# Patient Record
Sex: MALE | Race: WHITE | NOT HISPANIC OR LATINO | Employment: OTHER | URBAN - NONMETROPOLITAN AREA
[De-identification: names, ages, dates, MRNs, and addresses within clinical notes are randomized per-mention and may not be internally consistent; named-entity substitution may affect disease eponyms.]

---

## 2018-07-17 ENCOUNTER — TRANSCRIBE ORDERS (OUTPATIENT)
Dept: PODIATRY | Facility: CLINIC | Age: 83
End: 2018-07-17

## 2018-07-17 DIAGNOSIS — L60.0 INGROWN TOENAIL: Primary | ICD-10-CM

## 2019-01-28 ENCOUNTER — PREP FOR SURGERY (OUTPATIENT)
Dept: OTHER | Facility: HOSPITAL | Age: 84
End: 2019-01-28

## 2019-01-28 DIAGNOSIS — N47.1 PHIMOSIS: Primary | ICD-10-CM

## 2019-01-28 RX ORDER — LEVOFLOXACIN 5 MG/ML
500 INJECTION, SOLUTION INTRAVENOUS ONCE
Status: CANCELLED | OUTPATIENT
Start: 2019-02-13

## 2019-01-29 PROBLEM — N47.1 PHIMOSIS: Status: ACTIVE | Noted: 2019-01-29

## 2019-02-26 ENCOUNTER — ANESTHESIA EVENT (OUTPATIENT)
Dept: PERIOP | Facility: HOSPITAL | Age: 84
End: 2019-02-26

## 2019-02-27 ENCOUNTER — ANESTHESIA (OUTPATIENT)
Dept: PERIOP | Facility: HOSPITAL | Age: 84
End: 2019-02-27

## 2019-02-27 ENCOUNTER — HOSPITAL ENCOUNTER (OUTPATIENT)
Facility: HOSPITAL | Age: 84
Setting detail: HOSPITAL OUTPATIENT SURGERY
Discharge: HOME | End: 2019-02-27
Attending: UROLOGY | Admitting: UROLOGY

## 2019-02-27 VITALS
HEIGHT: 68 IN | WEIGHT: 147.71 LBS | OXYGEN SATURATION: 93 % | RESPIRATION RATE: 18 BRPM | BODY MASS INDEX: 22.39 KG/M2 | DIASTOLIC BLOOD PRESSURE: 68 MMHG | SYSTOLIC BLOOD PRESSURE: 116 MMHG | HEART RATE: 102 BPM | TEMPERATURE: 97.4 F

## 2019-02-27 DIAGNOSIS — N47.1 PHIMOSIS: ICD-10-CM

## 2019-02-27 LAB
ANION GAP SERPL CALCULATED.3IONS-SCNC: 7 MMOL/L (ref 5–15)
BUN BLD-MCNC: 9 MG/DL (ref 7–21)
BUN/CREAT SERPL: 12.3 (ref 7–25)
CALCIUM SPEC-SCNC: 8.4 MG/DL (ref 8.4–10.2)
CHLORIDE SERPL-SCNC: 100 MMOL/L (ref 95–110)
CO2 SERPL-SCNC: 28 MMOL/L (ref 22–31)
CREAT BLD-MCNC: 0.73 MG/DL (ref 0.7–1.3)
GFR SERPL CREATININE-BSD FRML MDRD: 102 ML/MIN/1.73 (ref 42–98)
GLUCOSE BLD-MCNC: 118 MG/DL (ref 60–100)
GLUCOSE BLDC GLUCOMTR-MCNC: 114 MG/DL (ref 70–130)
GLUCOSE BLDC GLUCOMTR-MCNC: 124 MG/DL (ref 70–130)
POTASSIUM BLD-SCNC: 3.3 MMOL/L (ref 3.5–5.1)
SODIUM BLD-SCNC: 135 MMOL/L (ref 137–145)

## 2019-02-27 PROCEDURE — 82962 GLUCOSE BLOOD TEST: CPT

## 2019-02-27 PROCEDURE — 88304 TISSUE EXAM BY PATHOLOGIST: CPT | Performed by: PATHOLOGY

## 2019-02-27 PROCEDURE — 93010 ELECTROCARDIOGRAM REPORT: CPT | Performed by: INTERNAL MEDICINE

## 2019-02-27 PROCEDURE — 80048 BASIC METABOLIC PNL TOTAL CA: CPT | Performed by: ANESTHESIOLOGY

## 2019-02-27 PROCEDURE — 93005 ELECTROCARDIOGRAM TRACING: CPT | Performed by: ANESTHESIOLOGY

## 2019-02-27 PROCEDURE — 88304 TISSUE EXAM BY PATHOLOGIST: CPT | Performed by: UROLOGY

## 2019-02-27 PROCEDURE — 25010000002 PROPOFOL 10 MG/ML EMULSION: Performed by: NURSE ANESTHETIST, CERTIFIED REGISTERED

## 2019-02-27 PROCEDURE — 25010000002 LEVOFLOXACIN PER 250 MG: Performed by: UROLOGY

## 2019-02-27 PROCEDURE — 25010000002 FENTANYL CITRATE (PF) 100 MCG/2ML SOLUTION: Performed by: NURSE ANESTHETIST, CERTIFIED REGISTERED

## 2019-02-27 RX ORDER — WARFARIN SODIUM 1 MG/1
1 TABLET ORAL
COMMUNITY
End: 2020-06-23 | Stop reason: HOSPADM

## 2019-02-27 RX ORDER — SODIUM CHLORIDE 9 MG/ML
1000 INJECTION, SOLUTION INTRAVENOUS CONTINUOUS
Status: DISCONTINUED | OUTPATIENT
Start: 2019-02-27 | End: 2019-02-27 | Stop reason: HOSPADM

## 2019-02-27 RX ORDER — EPHEDRINE SULFATE 50 MG/ML
INJECTION, SOLUTION INTRAVENOUS AS NEEDED
Status: DISCONTINUED | OUTPATIENT
Start: 2019-02-27 | End: 2019-02-27 | Stop reason: SURG

## 2019-02-27 RX ORDER — OXYCODONE AND ACETAMINOPHEN 7.5; 325 MG/1; MG/1
1-2 TABLET ORAL EVERY 4 HOURS PRN
Qty: 10 TABLET | Refills: 0 | Status: SHIPPED | OUTPATIENT
Start: 2019-02-27 | End: 2020-06-20

## 2019-02-27 RX ORDER — FENTANYL CITRATE 50 UG/ML
INJECTION, SOLUTION INTRAMUSCULAR; INTRAVENOUS AS NEEDED
Status: DISCONTINUED | OUTPATIENT
Start: 2019-02-27 | End: 2019-02-27 | Stop reason: SURG

## 2019-02-27 RX ORDER — LIDOCAINE HYDROCHLORIDE 20 MG/ML
INJECTION, SOLUTION INFILTRATION; PERINEURAL AS NEEDED
Status: DISCONTINUED | OUTPATIENT
Start: 2019-02-27 | End: 2019-02-27 | Stop reason: SURG

## 2019-02-27 RX ORDER — PROPOFOL 10 MG/ML
VIAL (ML) INTRAVENOUS AS NEEDED
Status: DISCONTINUED | OUTPATIENT
Start: 2019-02-27 | End: 2019-02-27 | Stop reason: SURG

## 2019-02-27 RX ORDER — LOVASTATIN 40 MG/1
40 TABLET ORAL NIGHTLY
COMMUNITY

## 2019-02-27 RX ORDER — LEVOFLOXACIN 5 MG/ML
500 INJECTION, SOLUTION INTRAVENOUS ONCE
Status: COMPLETED | OUTPATIENT
Start: 2019-02-27 | End: 2019-02-27

## 2019-02-27 RX ADMIN — LIDOCAINE HYDROCHLORIDE 50 MG: 20 INJECTION, SOLUTION INFILTRATION; PERINEURAL at 18:03

## 2019-02-27 RX ADMIN — SODIUM CHLORIDE 1000 ML: 9 INJECTION, SOLUTION INTRAVENOUS at 14:24

## 2019-02-27 RX ADMIN — EPHEDRINE SULFATE 12.5 MG: 50 INJECTION INTRAVENOUS at 18:25

## 2019-02-27 RX ADMIN — FENTANYL CITRATE 25 MCG: 50 INJECTION, SOLUTION INTRAMUSCULAR; INTRAVENOUS at 18:32

## 2019-02-27 RX ADMIN — FENTANYL CITRATE 25 MCG: 50 INJECTION, SOLUTION INTRAMUSCULAR; INTRAVENOUS at 18:03

## 2019-02-27 RX ADMIN — EPHEDRINE SULFATE 12.5 MG: 50 INJECTION INTRAVENOUS at 18:15

## 2019-02-27 RX ADMIN — PROPOFOL 150 MG: 10 INJECTION, EMULSION INTRAVENOUS at 18:03

## 2019-02-27 RX ADMIN — FENTANYL CITRATE 25 MCG: 50 INJECTION, SOLUTION INTRAMUSCULAR; INTRAVENOUS at 18:17

## 2019-02-27 RX ADMIN — FENTANYL CITRATE 25 MCG: 50 INJECTION, SOLUTION INTRAMUSCULAR; INTRAVENOUS at 18:08

## 2019-02-27 RX ADMIN — LEVOFLOXACIN 500 MG: 5 INJECTION, SOLUTION INTRAVENOUS at 18:09

## 2019-02-27 NOTE — ANESTHESIA PREPROCEDURE EVALUATION
Anesthesia Evaluation     no history of anesthetic complications:  NPO Solid Status: > 8 hours  NPO Liquid Status: > 8 hours           Airway   Mallampati: II  TM distance: >3 FB  Neck ROM: limited  Possible difficult intubation  Dental    (+) poor dentition        Pulmonary    (+) pulmonary embolism, a smoker (whews tobacco), decreased breath sounds,   (-) sleep apnea    ROS comment: snores  Cardiovascular - normal exam  Exercise tolerance: poor (<4 METS)    ECG reviewed  PT is on anticoagulation therapy  Rhythm: regular  Rate: normal    (+) hypertension well controlled, DVT resolved, hyperlipidemia,   (-) valvular problems/murmurs, past MI, dysrhythmias, angina, cardiac stents    ROS comment: NSR with PACs    Neuro/Psych  (+) CVA (left side) residual symptoms, psychiatric history Anxiety and Depression,     (-) seizures, headaches  GI/Hepatic/Renal/Endo    (+)   diabetes mellitus (glu 114) type 2 well controlled,   (-) GERD, hepatitis, liver disease, no renal disease, hypothyroidism    Musculoskeletal     (+) chronic pain, neck pain,   Abdominal    Substance History - negative use  (-) alcohol use, drug use     OB/GYN          Other   (+) arthritis     (-) history of cancer  ROS/Med Hx Other: phimosis                  Anesthesia Plan    ASA 3     general     intravenous induction   Anesthetic plan, all risks, benefits, and alternatives have been provided, discussed and informed consent has been obtained with: patient and child.

## 2019-02-28 ENCOUNTER — HOSPITAL ENCOUNTER (EMERGENCY)
Facility: HOSPITAL | Age: 84
Discharge: HOME | End: 2019-02-28
Attending: FAMILY MEDICINE | Admitting: FAMILY MEDICINE

## 2019-02-28 VITALS
RESPIRATION RATE: 20 BRPM | SYSTOLIC BLOOD PRESSURE: 124 MMHG | WEIGHT: 171.3 LBS | HEIGHT: 68 IN | OXYGEN SATURATION: 96 % | HEART RATE: 104 BPM | DIASTOLIC BLOOD PRESSURE: 73 MMHG | TEMPERATURE: 98.4 F | BODY MASS INDEX: 25.96 KG/M2

## 2019-02-28 DIAGNOSIS — R33.8 ACUTE URINARY RETENTION: Primary | ICD-10-CM

## 2019-02-28 LAB
LAB AP CASE REPORT: NORMAL
PATH REPORT.FINAL DX SPEC: NORMAL
PATH REPORT.GROSS SPEC: NORMAL

## 2019-02-28 PROCEDURE — 99283 EMERGENCY DEPT VISIT LOW MDM: CPT

## 2019-02-28 PROCEDURE — 51702 INSERT TEMP BLADDER CATH: CPT

## 2019-02-28 NOTE — ANESTHESIA POSTPROCEDURE EVALUATION
Patient: Arnoldo Guzman    Procedure Summary     Date:  02/27/19 Room / Location:  [facility] OR 08 / [facility] OR    Anesthesia Start:  1756 Anesthesia Stop:  1846    Procedure:  CIRCUMCISION (N/A Penis) Diagnosis:       Phimosis      (Phimosis [N47.1])    Surgeon:  Raza Zayas MD Provider:  Lee Junior MD    Anesthesia Type:  general ASA Status:  3          Anesthesia Type: general  Last vitals  BP   115/72 (02/27/19 1408)   Temp   96.9 °F (36.1 °C) (02/27/19 1408)   Pulse   82 (02/27/19 1408)   Resp   18 (02/27/19 1408)     SpO2   96 % (02/27/19 1408)     Post Anesthesia Care and Evaluation    Patient location during evaluation: bedside  Patient participation: complete - patient cannot participate  Level of consciousness: awake  Pain score: 0  Pain management: adequate  Airway patency: patent  Anesthetic complications: No anesthetic complications  PONV Status: none  Cardiovascular status: acceptable  Respiratory status: acceptable  Hydration status: acceptable

## 2019-02-28 NOTE — ANESTHESIA PROCEDURE NOTES
Airway  Urgency: elective      General Information and Staff    Patient location during procedure: OR  CRNA: Ayden Serna CRNA    Indications and Patient Condition  Indications for airway management: airway protection    Preoxygenated: yes  Mask difficulty assessment: 0 - not attempted    Final Airway Details  Final airway type: supraglottic airway      Successful airway: I-gel  Size 4    Number of attempts at approach: 1

## 2019-03-06 ENCOUNTER — APPOINTMENT (OUTPATIENT)
Dept: CT IMAGING | Facility: HOSPITAL | Age: 84
End: 2019-03-06

## 2019-03-06 ENCOUNTER — APPOINTMENT (OUTPATIENT)
Dept: GENERAL RADIOLOGY | Facility: HOSPITAL | Age: 84
End: 2019-03-06

## 2019-03-06 ENCOUNTER — HOSPITAL ENCOUNTER (INPATIENT)
Facility: HOSPITAL | Age: 84
LOS: 4 days | Discharge: HOME | End: 2019-03-11
Attending: EMERGENCY MEDICINE | Admitting: HOSPITALIST

## 2019-03-06 DIAGNOSIS — Z78.9 IMPAIRED MOBILITY AND ADLS: ICD-10-CM

## 2019-03-06 DIAGNOSIS — A41.9 SEPSIS SECONDARY TO UTI (HCC): Primary | ICD-10-CM

## 2019-03-06 DIAGNOSIS — Z74.09 IMPAIRED MOBILITY AND ADLS: ICD-10-CM

## 2019-03-06 DIAGNOSIS — Z74.09 IMPAIRED PHYSICAL MOBILITY: ICD-10-CM

## 2019-03-06 DIAGNOSIS — N39.0 SEPSIS SECONDARY TO UTI (HCC): Primary | ICD-10-CM

## 2019-03-06 LAB
ALBUMIN SERPL-MCNC: 3.4 G/DL (ref 3.4–4.8)
ALBUMIN/GLOB SERPL: 1 G/DL (ref 1.1–1.8)
ALP SERPL-CCNC: 97 U/L (ref 38–126)
ALT SERPL W P-5'-P-CCNC: 13 U/L (ref 21–72)
ANION GAP SERPL CALCULATED.3IONS-SCNC: 6 MMOL/L (ref 5–15)
APTT PPP: 36.8 SECONDS (ref 20–40.3)
AST SERPL-CCNC: 20 U/L (ref 17–59)
BACTERIA UR QL AUTO: ABNORMAL /HPF
BASOPHILS # BLD AUTO: 0.06 10*3/MM3 (ref 0–0.2)
BASOPHILS NFR BLD AUTO: 0.6 % (ref 0–1.5)
BILIRUB SERPL-MCNC: 1 MG/DL (ref 0.2–1.3)
BILIRUB UR QL STRIP: ABNORMAL
BUN BLD-MCNC: 10 MG/DL (ref 7–21)
BUN/CREAT SERPL: 12.3 (ref 7–25)
CALCIUM SPEC-SCNC: 8.4 MG/DL (ref 8.4–10.2)
CHLORIDE SERPL-SCNC: 92 MMOL/L (ref 95–110)
CLARITY UR: ABNORMAL
CO2 SERPL-SCNC: 32 MMOL/L (ref 22–31)
COLOR UR: ABNORMAL
CREAT BLD-MCNC: 0.81 MG/DL (ref 0.7–1.3)
D-LACTATE SERPL-SCNC: 2.4 MMOL/L (ref 0.5–2)
DEPRECATED RDW RBC AUTO: 48.9 FL (ref 37–54)
EOSINOPHIL # BLD AUTO: 0.05 10*3/MM3 (ref 0–0.4)
EOSINOPHIL NFR BLD AUTO: 0.5 % (ref 0.3–6.2)
ERYTHROCYTE [DISTWIDTH] IN BLOOD BY AUTOMATED COUNT: 14.4 % (ref 12.3–15.4)
GFR SERPL CREATININE-BSD FRML MDRD: 91 ML/MIN/1.73 (ref 42–98)
GLOBULIN UR ELPH-MCNC: 3.3 GM/DL (ref 2.3–3.5)
GLUCOSE BLD-MCNC: 252 MG/DL (ref 60–100)
GLUCOSE UR STRIP-MCNC: ABNORMAL MG/DL
HCT VFR BLD AUTO: 42.9 % (ref 37.5–51)
HGB BLD-MCNC: 14.9 G/DL (ref 13–17.7)
HGB UR QL STRIP.AUTO: ABNORMAL
HOLD SPECIMEN: NORMAL
HYALINE CASTS UR QL AUTO: ABNORMAL /LPF
IMM GRANULOCYTES # BLD AUTO: 0.02 10*3/MM3 (ref 0–0.05)
IMM GRANULOCYTES NFR BLD AUTO: 0.2 % (ref 0–0.5)
INR PPP: 1.56 (ref 0.8–1.2)
KETONES UR QL STRIP: ABNORMAL
LEUKOCYTE ESTERASE UR QL STRIP.AUTO: ABNORMAL
LYMPHOCYTES # BLD AUTO: 1.39 10*3/MM3 (ref 0.7–3.1)
LYMPHOCYTES NFR BLD AUTO: 14.2 % (ref 19.6–45.3)
MCH RBC QN AUTO: 32.1 PG (ref 26.6–33)
MCHC RBC AUTO-ENTMCNC: 34.7 G/DL (ref 31.5–35.7)
MCV RBC AUTO: 92.5 FL (ref 79–97)
MONOCYTES # BLD AUTO: 0.61 10*3/MM3 (ref 0.1–0.9)
MONOCYTES NFR BLD AUTO: 6.2 % (ref 5–12)
NEUTROPHILS # BLD AUTO: 7.69 10*3/MM3 (ref 1.4–7)
NEUTROPHILS NFR BLD AUTO: 78.3 % (ref 42.7–76)
NITRITE UR QL STRIP: POSITIVE
NRBC BLD AUTO-RTO: 0 /100 WBC (ref 0–0)
PH UR STRIP.AUTO: <=5 [PH] (ref 5–9)
PLATELET # BLD AUTO: 223 10*3/MM3 (ref 140–450)
PMV BLD AUTO: 9.4 FL (ref 6–12)
POTASSIUM BLD-SCNC: 3 MMOL/L (ref 3.5–5.1)
PROT SERPL-MCNC: 6.7 G/DL (ref 6.3–8.6)
PROT UR QL STRIP: ABNORMAL
PROTHROMBIN TIME: 18.2 SECONDS (ref 11.1–15.3)
RBC # BLD AUTO: 4.64 10*6/MM3 (ref 4.14–5.8)
RBC # UR: ABNORMAL /HPF
REF LAB TEST METHOD: ABNORMAL
SODIUM BLD-SCNC: 130 MMOL/L (ref 137–145)
SP GR UR STRIP: 1.01 (ref 1–1.03)
SQUAMOUS #/AREA URNS HPF: ABNORMAL /HPF
UROBILINOGEN UR QL STRIP: ABNORMAL
WBC NRBC COR # BLD: 9.82 10*3/MM3 (ref 3.4–10.8)
WBC UR QL AUTO: ABNORMAL /HPF
YEAST URNS QL MICRO: ABNORMAL /HPF

## 2019-03-06 PROCEDURE — 84145 PROCALCITONIN (PCT): CPT | Performed by: INTERNAL MEDICINE

## 2019-03-06 PROCEDURE — 70450 CT HEAD/BRAIN W/O DYE: CPT

## 2019-03-06 PROCEDURE — 93005 ELECTROCARDIOGRAM TRACING: CPT | Performed by: INTERNAL MEDICINE

## 2019-03-06 PROCEDURE — G0378 HOSPITAL OBSERVATION PER HR: HCPCS

## 2019-03-06 PROCEDURE — 80053 COMPREHEN METABOLIC PANEL: CPT | Performed by: EMERGENCY MEDICINE

## 2019-03-06 PROCEDURE — 71045 X-RAY EXAM CHEST 1 VIEW: CPT

## 2019-03-06 PROCEDURE — 81001 URINALYSIS AUTO W/SCOPE: CPT | Performed by: EMERGENCY MEDICINE

## 2019-03-06 PROCEDURE — 85610 PROTHROMBIN TIME: CPT | Performed by: EMERGENCY MEDICINE

## 2019-03-06 PROCEDURE — 87040 BLOOD CULTURE FOR BACTERIA: CPT | Performed by: EMERGENCY MEDICINE

## 2019-03-06 PROCEDURE — 99284 EMERGENCY DEPT VISIT MOD MDM: CPT

## 2019-03-06 PROCEDURE — 83605 ASSAY OF LACTIC ACID: CPT | Performed by: EMERGENCY MEDICINE

## 2019-03-06 PROCEDURE — 25010000002 PIPERACILLIN SOD-TAZOBACTAM PER 1 G: Performed by: EMERGENCY MEDICINE

## 2019-03-06 PROCEDURE — 85025 COMPLETE CBC W/AUTO DIFF WBC: CPT | Performed by: EMERGENCY MEDICINE

## 2019-03-06 PROCEDURE — 87086 URINE CULTURE/COLONY COUNT: CPT | Performed by: EMERGENCY MEDICINE

## 2019-03-06 PROCEDURE — 85730 THROMBOPLASTIN TIME PARTIAL: CPT | Performed by: EMERGENCY MEDICINE

## 2019-03-06 RX ORDER — DEXTROSE MONOHYDRATE 25 G/50ML
25 INJECTION, SOLUTION INTRAVENOUS
Status: DISCONTINUED | OUTPATIENT
Start: 2019-03-06 | End: 2019-03-11 | Stop reason: HOSPADM

## 2019-03-06 RX ORDER — MAGNESIUM SULFATE HEPTAHYDRATE 40 MG/ML
2 INJECTION, SOLUTION INTRAVENOUS AS NEEDED
Status: DISCONTINUED | OUTPATIENT
Start: 2019-03-06 | End: 2019-03-11 | Stop reason: HOSPADM

## 2019-03-06 RX ORDER — NICOTINE POLACRILEX 4 MG
15 LOZENGE BUCCAL
Status: DISCONTINUED | OUTPATIENT
Start: 2019-03-06 | End: 2019-03-11 | Stop reason: HOSPADM

## 2019-03-06 RX ORDER — IPRATROPIUM BROMIDE AND ALBUTEROL SULFATE 2.5; .5 MG/3ML; MG/3ML
3 SOLUTION RESPIRATORY (INHALATION)
Status: DISCONTINUED | OUTPATIENT
Start: 2019-03-07 | End: 2019-03-11 | Stop reason: HOSPADM

## 2019-03-06 RX ORDER — MORPHINE SULFATE 2 MG/ML
1 INJECTION, SOLUTION INTRAMUSCULAR; INTRAVENOUS EVERY 4 HOURS PRN
Status: DISCONTINUED | OUTPATIENT
Start: 2019-03-06 | End: 2019-03-11 | Stop reason: HOSPADM

## 2019-03-06 RX ORDER — SODIUM CHLORIDE 0.9 % (FLUSH) 0.9 %
3-10 SYRINGE (ML) INJECTION AS NEEDED
Status: DISCONTINUED | OUTPATIENT
Start: 2019-03-06 | End: 2019-03-11 | Stop reason: HOSPADM

## 2019-03-06 RX ORDER — DOCUSATE SODIUM 100 MG/1
100 CAPSULE, LIQUID FILLED ORAL DAILY
Status: DISCONTINUED | OUTPATIENT
Start: 2019-03-07 | End: 2019-03-11 | Stop reason: HOSPADM

## 2019-03-06 RX ORDER — POTASSIUM CHLORIDE 7.45 MG/ML
10 INJECTION INTRAVENOUS
Status: DISCONTINUED | OUTPATIENT
Start: 2019-03-06 | End: 2019-03-11 | Stop reason: HOSPADM

## 2019-03-06 RX ORDER — ATORVASTATIN CALCIUM 10 MG/1
10 TABLET, FILM COATED ORAL DAILY
Status: DISCONTINUED | OUTPATIENT
Start: 2019-03-07 | End: 2019-03-11 | Stop reason: HOSPADM

## 2019-03-06 RX ORDER — WARFARIN SODIUM 1 MG/1
1 TABLET ORAL
Status: DISCONTINUED | OUTPATIENT
Start: 2019-03-06 | End: 2019-03-07

## 2019-03-06 RX ORDER — LISINOPRIL 20 MG/1
20 TABLET ORAL DAILY
Status: DISCONTINUED | OUTPATIENT
Start: 2019-03-07 | End: 2019-03-06

## 2019-03-06 RX ORDER — MAGNESIUM SULFATE HEPTAHYDRATE 40 MG/ML
4 INJECTION, SOLUTION INTRAVENOUS AS NEEDED
Status: DISCONTINUED | OUTPATIENT
Start: 2019-03-06 | End: 2019-03-11 | Stop reason: HOSPADM

## 2019-03-06 RX ORDER — SODIUM CHLORIDE 9 MG/ML
INJECTION, SOLUTION INTRAVENOUS
Status: DISCONTINUED
Start: 2019-03-06 | End: 2019-03-11 | Stop reason: HOSPADM

## 2019-03-06 RX ORDER — SODIUM CHLORIDE 9 MG/ML
100 INJECTION, SOLUTION INTRAVENOUS CONTINUOUS
Status: DISCONTINUED | OUTPATIENT
Start: 2019-03-06 | End: 2019-03-11 | Stop reason: HOSPADM

## 2019-03-06 RX ORDER — ONDANSETRON 2 MG/ML
4 INJECTION INTRAMUSCULAR; INTRAVENOUS EVERY 6 HOURS PRN
Status: DISCONTINUED | OUTPATIENT
Start: 2019-03-06 | End: 2019-03-11 | Stop reason: HOSPADM

## 2019-03-06 RX ORDER — HEPARIN SODIUM 5000 [USP'U]/ML
5000 INJECTION, SOLUTION INTRAVENOUS; SUBCUTANEOUS EVERY 8 HOURS SCHEDULED
Status: DISCONTINUED | OUTPATIENT
Start: 2019-03-06 | End: 2019-03-07

## 2019-03-06 RX ORDER — ASPIRIN 81 MG/1
81 TABLET ORAL DAILY
Status: DISCONTINUED | OUTPATIENT
Start: 2019-03-07 | End: 2019-03-11 | Stop reason: HOSPADM

## 2019-03-06 RX ORDER — POTASSIUM CHLORIDE 1.5 G/1.77G
40 POWDER, FOR SOLUTION ORAL AS NEEDED
Status: DISCONTINUED | OUTPATIENT
Start: 2019-03-06 | End: 2019-03-11 | Stop reason: HOSPADM

## 2019-03-06 RX ORDER — SODIUM CHLORIDE 0.9 % (FLUSH) 0.9 %
3 SYRINGE (ML) INJECTION EVERY 12 HOURS SCHEDULED
Status: DISCONTINUED | OUTPATIENT
Start: 2019-03-06 | End: 2019-03-11 | Stop reason: HOSPADM

## 2019-03-06 RX ORDER — POTASSIUM CHLORIDE 750 MG/1
40 CAPSULE, EXTENDED RELEASE ORAL AS NEEDED
Status: DISCONTINUED | OUTPATIENT
Start: 2019-03-06 | End: 2019-03-11 | Stop reason: HOSPADM

## 2019-03-06 RX ORDER — CITALOPRAM 20 MG/1
20 TABLET ORAL DAILY
Status: DISCONTINUED | OUTPATIENT
Start: 2019-03-07 | End: 2019-03-07

## 2019-03-06 RX ORDER — FAMOTIDINE 40 MG/1
40 TABLET, FILM COATED ORAL DAILY
Status: DISCONTINUED | OUTPATIENT
Start: 2019-03-07 | End: 2019-03-11 | Stop reason: HOSPADM

## 2019-03-06 RX ORDER — NALOXONE HCL 0.4 MG/ML
0.4 VIAL (ML) INJECTION
Status: DISCONTINUED | OUTPATIENT
Start: 2019-03-06 | End: 2019-03-11 | Stop reason: HOSPADM

## 2019-03-06 RX ADMIN — SODIUM CHLORIDE 500 ML: 9 INJECTION, SOLUTION INTRAVENOUS at 20:21

## 2019-03-06 RX ADMIN — PIPERACILLIN SODIUM,TAZOBACTAM SODIUM 3.38 G: 3; .375 INJECTION, POWDER, FOR SOLUTION INTRAVENOUS at 21:59

## 2019-03-07 ENCOUNTER — APPOINTMENT (OUTPATIENT)
Dept: ULTRASOUND IMAGING | Facility: HOSPITAL | Age: 84
End: 2019-03-07

## 2019-03-07 PROBLEM — G93.41 ENCEPHALOPATHY, METABOLIC: Status: ACTIVE | Noted: 2019-03-07

## 2019-03-07 PROBLEM — E87.6 HYPOKALEMIA: Status: ACTIVE | Noted: 2019-03-07

## 2019-03-07 PROBLEM — Z86.718 HISTORY OF DVT (DEEP VEIN THROMBOSIS): Status: ACTIVE | Noted: 2019-03-07

## 2019-03-07 PROBLEM — E11.9 TYPE 2 DIABETES MELLITUS (HCC): Status: ACTIVE | Noted: 2019-03-07

## 2019-03-07 PROBLEM — I10 ESSENTIAL HYPERTENSION: Status: ACTIVE | Noted: 2019-03-07

## 2019-03-07 PROBLEM — E87.1 HYPONATREMIA: Status: ACTIVE | Noted: 2019-03-07

## 2019-03-07 LAB
ANION GAP SERPL CALCULATED.3IONS-SCNC: 3 MMOL/L (ref 5–15)
ARTICHOKE IGE QN: 53 MG/DL (ref 1–129)
BACTERIA UR QL AUTO: ABNORMAL /HPF
BASOPHILS # BLD AUTO: 0.07 10*3/MM3 (ref 0–0.2)
BASOPHILS NFR BLD AUTO: 0.7 % (ref 0–1.5)
BILIRUB UR QL STRIP: ABNORMAL
BUN BLD-MCNC: 9 MG/DL (ref 7–21)
BUN/CREAT SERPL: 12.9 (ref 7–25)
CALCIUM SPEC-SCNC: 7.6 MG/DL (ref 8.4–10.2)
CHLORIDE SERPL-SCNC: 101 MMOL/L (ref 95–110)
CHOLEST SERPL-MCNC: 97 MG/DL (ref 0–199)
CLARITY UR: ABNORMAL
CO2 SERPL-SCNC: 30 MMOL/L (ref 22–31)
COLOR UR: ABNORMAL
CREAT BLD-MCNC: 0.7 MG/DL (ref 0.7–1.3)
D-LACTATE SERPL-SCNC: 1.1 MMOL/L (ref 0.5–2)
D-LACTATE SERPL-SCNC: 1.2 MMOL/L (ref 0.5–2)
DEPRECATED RDW RBC AUTO: 48.1 FL (ref 37–54)
EOSINOPHIL # BLD AUTO: 0.16 10*3/MM3 (ref 0–0.4)
EOSINOPHIL NFR BLD AUTO: 1.7 % (ref 0.3–6.2)
ERYTHROCYTE [DISTWIDTH] IN BLOOD BY AUTOMATED COUNT: 14.2 % (ref 12.3–15.4)
GFR SERPL CREATININE-BSD FRML MDRD: 107 ML/MIN/1.73 (ref 42–98)
GLUCOSE BLD-MCNC: 154 MG/DL (ref 60–100)
GLUCOSE BLDC GLUCOMTR-MCNC: 169 MG/DL (ref 70–130)
GLUCOSE BLDC GLUCOMTR-MCNC: 179 MG/DL (ref 70–130)
GLUCOSE BLDC GLUCOMTR-MCNC: 215 MG/DL (ref 70–130)
GLUCOSE BLDC GLUCOMTR-MCNC: 269 MG/DL (ref 70–130)
GLUCOSE UR STRIP-MCNC: ABNORMAL MG/DL
HBA1C MFR BLD: 7.7 % (ref 4–5.6)
HCT VFR BLD AUTO: 38.6 % (ref 37.5–51)
HDLC SERPL-MCNC: 32 MG/DL (ref 60–200)
HGB BLD-MCNC: 13.3 G/DL (ref 13–17.7)
HGB UR QL STRIP.AUTO: ABNORMAL
HOLD SPECIMEN: NORMAL
HYALINE CASTS UR QL AUTO: ABNORMAL /LPF
IMM GRANULOCYTES # BLD AUTO: 0.02 10*3/MM3 (ref 0–0.05)
IMM GRANULOCYTES NFR BLD AUTO: 0.2 % (ref 0–0.5)
INR PPP: 1.51 (ref 0.8–1.2)
KETONES UR QL STRIP: ABNORMAL
LDLC/HDLC SERPL: 1.53 {RATIO} (ref 0–3.55)
LEUKOCYTE ESTERASE UR QL STRIP.AUTO: ABNORMAL
LYMPHOCYTES # BLD AUTO: 2.08 10*3/MM3 (ref 0.7–3.1)
LYMPHOCYTES NFR BLD AUTO: 22.2 % (ref 19.6–45.3)
MAGNESIUM SERPL-MCNC: 2 MG/DL (ref 1.6–2.3)
MCH RBC QN AUTO: 31.6 PG (ref 26.6–33)
MCHC RBC AUTO-ENTMCNC: 34.5 G/DL (ref 31.5–35.7)
MCV RBC AUTO: 91.7 FL (ref 79–97)
MONOCYTES # BLD AUTO: 0.59 10*3/MM3 (ref 0.1–0.9)
MONOCYTES NFR BLD AUTO: 6.3 % (ref 5–12)
NEUTROPHILS # BLD AUTO: 6.45 10*3/MM3 (ref 1.4–7)
NEUTROPHILS NFR BLD AUTO: 68.9 % (ref 42.7–76)
NITRITE UR QL STRIP: NEGATIVE
NRBC BLD AUTO-RTO: 0 /100 WBC (ref 0–0)
PH UR STRIP.AUTO: 5.5 [PH] (ref 5–9)
PLATELET # BLD AUTO: 198 10*3/MM3 (ref 140–450)
PMV BLD AUTO: 9.7 FL (ref 6–12)
POTASSIUM BLD-SCNC: 2.6 MMOL/L (ref 3.5–5.1)
POTASSIUM BLD-SCNC: 2.9 MMOL/L (ref 3.5–5.1)
PROT UR QL STRIP: ABNORMAL
PROTHROMBIN TIME: 17.7 SECONDS (ref 11.1–15.3)
RBC # BLD AUTO: 4.21 10*6/MM3 (ref 4.14–5.8)
RBC # UR: ABNORMAL /HPF
REF LAB TEST METHOD: ABNORMAL
SODIUM BLD-SCNC: 134 MMOL/L (ref 137–145)
SP GR UR STRIP: >=1.03 (ref 1–1.03)
SQUAMOUS #/AREA URNS HPF: ABNORMAL /HPF
TRIGL SERPL-MCNC: 81 MG/DL (ref 20–199)
TSH SERPL DL<=0.05 MIU/L-ACNC: 1.4 MIU/ML (ref 0.46–4.68)
UROBILINOGEN UR QL STRIP: ABNORMAL
WBC NRBC COR # BLD: 9.37 10*3/MM3 (ref 3.4–10.8)
WBC UR QL AUTO: ABNORMAL /HPF

## 2019-03-07 PROCEDURE — 84132 ASSAY OF SERUM POTASSIUM: CPT | Performed by: NURSE PRACTITIONER

## 2019-03-07 PROCEDURE — 63710000001 INSULIN ASPART PER 5 UNITS: Performed by: INTERNAL MEDICINE

## 2019-03-07 PROCEDURE — 25010000003 POTASSIUM CHLORIDE 10 MEQ/100ML SOLUTION: Performed by: INTERNAL MEDICINE

## 2019-03-07 PROCEDURE — 85025 COMPLETE CBC W/AUTO DIFF WBC: CPT | Performed by: INTERNAL MEDICINE

## 2019-03-07 PROCEDURE — 76870 US EXAM SCROTUM: CPT

## 2019-03-07 PROCEDURE — 94799 UNLISTED PULMONARY SVC/PX: CPT

## 2019-03-07 PROCEDURE — 94760 N-INVAS EAR/PLS OXIMETRY 1: CPT

## 2019-03-07 PROCEDURE — 85610 PROTHROMBIN TIME: CPT | Performed by: INTERNAL MEDICINE

## 2019-03-07 PROCEDURE — 25010000002 HALOPERIDOL LACTATE PER 5 MG: Performed by: INTERNAL MEDICINE

## 2019-03-07 PROCEDURE — 83036 HEMOGLOBIN GLYCOSYLATED A1C: CPT | Performed by: INTERNAL MEDICINE

## 2019-03-07 PROCEDURE — 83605 ASSAY OF LACTIC ACID: CPT | Performed by: INTERNAL MEDICINE

## 2019-03-07 PROCEDURE — 83735 ASSAY OF MAGNESIUM: CPT | Performed by: INTERNAL MEDICINE

## 2019-03-07 PROCEDURE — 81001 URINALYSIS AUTO W/SCOPE: CPT | Performed by: INTERNAL MEDICINE

## 2019-03-07 PROCEDURE — 93010 ELECTROCARDIOGRAM REPORT: CPT | Performed by: INTERNAL MEDICINE

## 2019-03-07 PROCEDURE — 83605 ASSAY OF LACTIC ACID: CPT | Performed by: EMERGENCY MEDICINE

## 2019-03-07 PROCEDURE — 87086 URINE CULTURE/COLONY COUNT: CPT | Performed by: INTERNAL MEDICINE

## 2019-03-07 PROCEDURE — 25010000002 MORPHINE PER 10 MG: Performed by: INTERNAL MEDICINE

## 2019-03-07 PROCEDURE — 82962 GLUCOSE BLOOD TEST: CPT

## 2019-03-07 PROCEDURE — 25010000002 PIPERACILLIN SOD-TAZOBACTAM PER 1 G: Performed by: INTERNAL MEDICINE

## 2019-03-07 PROCEDURE — 84443 ASSAY THYROID STIM HORMONE: CPT | Performed by: INTERNAL MEDICINE

## 2019-03-07 PROCEDURE — 25010000002 HEPARIN (PORCINE) PER 1000 UNITS: Performed by: INTERNAL MEDICINE

## 2019-03-07 PROCEDURE — 80048 BASIC METABOLIC PNL TOTAL CA: CPT | Performed by: INTERNAL MEDICINE

## 2019-03-07 PROCEDURE — 93976 VASCULAR STUDY: CPT

## 2019-03-07 PROCEDURE — 25010000002 HALOPERIDOL LACTATE PER 5 MG: Performed by: NURSE PRACTITIONER

## 2019-03-07 PROCEDURE — 80061 LIPID PANEL: CPT | Performed by: INTERNAL MEDICINE

## 2019-03-07 PROCEDURE — 25010000002 VANCOMYCIN 1 G RECONSTITUTED SOLUTION: Performed by: INTERNAL MEDICINE

## 2019-03-07 RX ORDER — CITALOPRAM 40 MG/1
40 TABLET ORAL DAILY
Status: DISCONTINUED | OUTPATIENT
Start: 2019-03-08 | End: 2019-03-08

## 2019-03-07 RX ORDER — HALOPERIDOL 5 MG/ML
2.5 INJECTION INTRAMUSCULAR ONCE
Status: COMPLETED | OUTPATIENT
Start: 2019-03-07 | End: 2019-03-07

## 2019-03-07 RX ORDER — TAMSULOSIN HYDROCHLORIDE 0.4 MG/1
0.4 CAPSULE ORAL NIGHTLY
Status: DISCONTINUED | OUTPATIENT
Start: 2019-03-07 | End: 2019-03-11 | Stop reason: HOSPADM

## 2019-03-07 RX ORDER — NYSTATIN 100000 [USP'U]/G
POWDER TOPICAL EVERY 12 HOURS SCHEDULED
Status: DISCONTINUED | OUTPATIENT
Start: 2019-03-07 | End: 2019-03-11 | Stop reason: HOSPADM

## 2019-03-07 RX ORDER — HALOPERIDOL 5 MG/ML
0.5 INJECTION INTRAMUSCULAR ONCE
Status: COMPLETED | OUTPATIENT
Start: 2019-03-07 | End: 2019-03-07

## 2019-03-07 RX ORDER — CITALOPRAM 40 MG/1
40 TABLET ORAL DAILY
Status: DISCONTINUED | OUTPATIENT
Start: 2019-03-07 | End: 2019-03-07

## 2019-03-07 RX ORDER — WARFARIN SODIUM 3 MG
1.5 TABLET ORAL
Status: DISCONTINUED | OUTPATIENT
Start: 2019-03-07 | End: 2019-03-10

## 2019-03-07 RX ORDER — CITALOPRAM 20 MG/1
20 TABLET ORAL ONCE
Status: COMPLETED | OUTPATIENT
Start: 2019-03-07 | End: 2019-03-07

## 2019-03-07 RX ADMIN — POTASSIUM CHLORIDE 10 MEQ: 7.46 INJECTION, SOLUTION INTRAVENOUS at 07:31

## 2019-03-07 RX ADMIN — FAMOTIDINE 40 MG: 40 TABLET ORAL at 08:51

## 2019-03-07 RX ADMIN — CITALOPRAM HYDROBROMIDE 20 MG: 20 TABLET ORAL at 10:44

## 2019-03-07 RX ADMIN — POTASSIUM CHLORIDE 40 MEQ: 750 CAPSULE, EXTENDED RELEASE ORAL at 23:31

## 2019-03-07 RX ADMIN — WARFARIN SODIUM 1 MG: 1 TABLET ORAL at 00:38

## 2019-03-07 RX ADMIN — HALOPERIDOL LACTATE 0.5 MG: 5 INJECTION, SOLUTION INTRAMUSCULAR at 16:22

## 2019-03-07 RX ADMIN — POTASSIUM CHLORIDE 10 MEQ: 7.46 INJECTION, SOLUTION INTRAVENOUS at 12:56

## 2019-03-07 RX ADMIN — SODIUM CHLORIDE, PRESERVATIVE FREE 3 ML: 5 INJECTION INTRAVENOUS at 21:18

## 2019-03-07 RX ADMIN — SODIUM CHLORIDE, PRESERVATIVE FREE 3 ML: 5 INJECTION INTRAVENOUS at 00:45

## 2019-03-07 RX ADMIN — NYSTATIN: 100000 POWDER TOPICAL at 21:17

## 2019-03-07 RX ADMIN — NYSTATIN AND TRIAMCINOLONE ACETONIDE: 100000; 1 CREAM TOPICAL at 21:18

## 2019-03-07 RX ADMIN — MORPHINE SULFATE 1 MG: 2 INJECTION, SOLUTION INTRAMUSCULAR; INTRAVENOUS at 15:04

## 2019-03-07 RX ADMIN — POTASSIUM CHLORIDE 10 MEQ: 7.46 INJECTION, SOLUTION INTRAVENOUS at 14:23

## 2019-03-07 RX ADMIN — PIPERACILLIN SODIUM,TAZOBACTAM SODIUM 3.38 G: 3; .375 INJECTION, POWDER, FOR SOLUTION INTRAVENOUS at 12:57

## 2019-03-07 RX ADMIN — IPRATROPIUM BROMIDE AND ALBUTEROL SULFATE 3 ML: 2.5; .5 SOLUTION RESPIRATORY (INHALATION) at 19:02

## 2019-03-07 RX ADMIN — DOCUSATE SODIUM 100 MG: 100 CAPSULE, LIQUID FILLED ORAL at 08:51

## 2019-03-07 RX ADMIN — NYSTATIN: 100000 POWDER TOPICAL at 15:04

## 2019-03-07 RX ADMIN — VANCOMYCIN HYDROCHLORIDE 1000 MG: 1 INJECTION, POWDER, LYOPHILIZED, FOR SOLUTION INTRAVENOUS at 17:33

## 2019-03-07 RX ADMIN — POTASSIUM CHLORIDE 10 MEQ: 7.46 INJECTION, SOLUTION INTRAVENOUS at 09:51

## 2019-03-07 RX ADMIN — ATORVASTATIN CALCIUM 10 MG: 10 TABLET, FILM COATED ORAL at 10:44

## 2019-03-07 RX ADMIN — POTASSIUM CHLORIDE 10 MEQ: 7.46 INJECTION, SOLUTION INTRAVENOUS at 11:49

## 2019-03-07 RX ADMIN — HEPARIN SODIUM 5000 UNITS: 5000 INJECTION INTRAVENOUS; SUBCUTANEOUS at 05:54

## 2019-03-07 RX ADMIN — TAMSULOSIN HYDROCHLORIDE 0.4 MG: 0.4 CAPSULE ORAL at 21:18

## 2019-03-07 RX ADMIN — SILVER SULFADIAZINE: 10 CREAM TOPICAL at 18:15

## 2019-03-07 RX ADMIN — SODIUM CHLORIDE 100 ML/HR: 900 INJECTION, SOLUTION INTRAVENOUS at 00:37

## 2019-03-07 RX ADMIN — IPRATROPIUM BROMIDE AND ALBUTEROL SULFATE 3 ML: 2.5; .5 SOLUTION RESPIRATORY (INHALATION) at 08:00

## 2019-03-07 RX ADMIN — PIPERACILLIN SODIUM,TAZOBACTAM SODIUM 3.38 G: 3; .375 INJECTION, POWDER, FOR SOLUTION INTRAVENOUS at 05:54

## 2019-03-07 RX ADMIN — CITALOPRAM HYDROBROMIDE 20 MG: 20 TABLET ORAL at 15:03

## 2019-03-07 RX ADMIN — VANCOMYCIN HYDROCHLORIDE 1500 MG: 1 INJECTION, POWDER, LYOPHILIZED, FOR SOLUTION INTRAVENOUS at 00:38

## 2019-03-07 RX ADMIN — POTASSIUM CHLORIDE 10 MEQ: 7.46 INJECTION, SOLUTION INTRAVENOUS at 08:51

## 2019-03-07 RX ADMIN — PIPERACILLIN SODIUM,TAZOBACTAM SODIUM 3.38 G: 3; .375 INJECTION, POWDER, FOR SOLUTION INTRAVENOUS at 21:18

## 2019-03-07 RX ADMIN — ASPIRIN 81 MG: 81 TABLET, COATED ORAL at 08:51

## 2019-03-07 RX ADMIN — SODIUM CHLORIDE, PRESERVATIVE FREE 10 ML: 5 INJECTION INTRAVENOUS at 08:51

## 2019-03-07 RX ADMIN — INSULIN ASPART 6 UNITS: 100 INJECTION, SOLUTION INTRAVENOUS; SUBCUTANEOUS at 21:18

## 2019-03-07 RX ADMIN — HALOPERIDOL LACTATE 2.5 MG: 5 INJECTION, SOLUTION INTRAMUSCULAR at 23:06

## 2019-03-07 RX ADMIN — Medication 1.5 MG: at 18:15

## 2019-03-07 RX ADMIN — HALOPERIDOL LACTATE 2.5 MG: 5 INJECTION, SOLUTION INTRAMUSCULAR at 22:08

## 2019-03-07 NOTE — PROGRESS NOTES
"Pharmacokinetics by Pharmacy - Vancomycin Initial Consult    Arnoldo Guzman is a 84 y.o. male being initiated on vancomycin for sepsis. Patient is also receiving zosyn.      Objective:     [Ht: 172.7 cm (68\"); Wt: 68.4 kg (150 lb 12.8 oz)]     Lab Results   Component Value Date    WBC 9.37 03/07/2019    WBC 9.82 03/06/2019    WBC 6.8 10/23/2015      Lab Results   Component Value Date    LACTATE 1.1 03/07/2019    LACTATE 1.2 03/07/2019    LACTATE 2.4 (C) 03/06/2019      Temp Readings from Last 1 Encounters:   03/07/19 97.4 °F (36.3 °C) (Axillary)     Estimated Creatinine Clearance: 66.5 mL/min (by C-G formula based on SCr of 0.7 mg/dL).   Lab Results   Component Value Date    CREATININE 0.70 03/07/2019    CREATININE 0.81 03/06/2019    CREATININE 0.73 02/27/2019       Baseline culture results:  Microbiology Results (last 10 days)       Procedure Component Value - Date/Time    Urine Culture - Urine, Urine, Clean Catch [[id number]]  (Normal) Collected:  03/06/19 2125    Lab Status:  Preliminary result Specimen:  Urine, Clean Catch Updated:  03/07/19 0626     Urine Culture Culture in progress    Blood Culture - Blood, Hand, Right [[id number]] Collected:  03/06/19 2027    Lab Status:  Preliminary result Specimen:  Blood from Hand, Right Updated:  03/07/19 0901     Blood Culture No growth at less than 24 hours    Blood Culture - Blood, Arm, Right [[id number]] Collected:  03/06/19 2019    Lab Status:  Preliminary result Specimen:  Blood from Arm, Right Updated:  03/07/19 0901     Blood Culture No growth at less than 24 hours               Assessment  WBC WNL  SCr WNL  Afebrile  Lactate trended down    Labs in progress:  3/7 urine IP  3/6 urine IP  3/6 BCx2 NG<24H    Will monitor urine cx, with no ryan's gangrene/gross infection/crepitus, possibly discontinue vancomycin and keep zosyn or possibly change to cephalosporin would be appropriate based on cultures.  Cultures currently in progress at time of note.    Utilizing AUC " dosing  Goal vancomycin AUC for sepsis: 400-600    Plan  1. Give vancomycin 1500mg IV x 1 3/7 0325 followed by vancomycin 1000 mg IV Q18H  2. Will order vancomycin peak after the third dose 3/8 1400 and vancomycin trough prior to the fourth dose 3/9 0530  3. Pharmacy will monitor renal function and adjust dose accordingly.    Delgado Aponte, [facility]  03/07/19 12:46 PM

## 2019-03-07 NOTE — PROGRESS NOTES
[facility] Medicine Services  INPATIENT PROGRESS NOTE    Length of Stay: 0  Date of Admission: 3/6/2019  Primary Care Physician: Sony Alvarado MD    Subjective   Chief Complaint: Confusion, penile pain  HPI:  84 year old male with a history of circumcision on 2/27/19 due to blantitis and phimosis who has cline that was placed for urinary retention who presented with blood in his urine, fatigue, and confusion.  UA is consistent with UTI.  He was admitted on broad spectrum antibiotics and his confusion has improved.  Urology is consulted.     Review of Systems   Constitutional: Positive for fatigue. Negative for chills and fever.   Respiratory: Negative for shortness of breath.    Cardiovascular: Negative for chest pain.   Gastrointestinal: Negative for abdominal pain, diarrhea, nausea and vomiting.   Genitourinary: Positive for difficulty urinating, penile pain, penile swelling and scrotal swelling.        All pertinent negatives and positives are as above. All other systems have been reviewed and are negative unless otherwise stated.     Objective    Temp:  [97.2 °F (36.2 °C)-98.8 °F (37.1 °C)] 97.4 °F (36.3 °C)  Heart Rate:  [] 84  Resp:  [18] 18  BP: (118-153)/(60-80) 130/63    Physical Exam   Constitutional: He is oriented to person, place, and time. He appears well-developed and well-nourished. No distress.   HENT:   Head: Normocephalic.   Eyes: Conjunctivae are normal.   Cardiovascular: Normal rate, regular rhythm, normal heart sounds and intact distal pulses.   Pulmonary/Chest: Effort normal and breath sounds normal. No respiratory distress.   Abdominal: Soft. Bowel sounds are normal. He exhibits no distension. There is no tenderness.   Musculoskeletal: Normal range of motion. He exhibits no edema.   Neurological: He is alert and oriented to person, place, and time.   Skin: Skin is warm and dry. He is not diaphoretic. No erythema.   Vitals  reviewed.          Results Review:  I have reviewed the labs, radiology results, and diagnostic studies.    Laboratory Data:   Results from last 7 days   Lab Units 03/07/19 0559 03/06/19 2003   SODIUM mmol/L 134* 130*   POTASSIUM mmol/L 2.6* 3.0*   CHLORIDE mmol/L 101 92*   CO2 mmol/L 30.0 32.0*   BUN mg/dL 9 10   CREATININE mg/dL 0.70 0.81   GLUCOSE mg/dL 154* 252*   CALCIUM mg/dL 7.6* 8.4   BILIRUBIN mg/dL  --  1.0   ALK PHOS U/L  --  97   ALT (SGPT) U/L  --  13*   AST (SGOT) U/L  --  20   ANION GAP mmol/L 3.0* 6.0     Estimated Creatinine Clearance: 66.5 mL/min (by C-G formula based on SCr of 0.7 mg/dL).  Results from last 7 days   Lab Units 03/07/19 0559   MAGNESIUM mg/dL 2.0         Results from last 7 days   Lab Units 03/07/19 0559 03/06/19 2003   WBC 10*3/mm3 9.37 9.82   HEMOGLOBIN g/dL 13.3 14.9   HEMATOCRIT % 38.6 42.9   PLATELETS 10*3/mm3 198 223     Results from last 7 days   Lab Units 03/07/19 0559 03/06/19 2003   INR  1.51* 1.56*       Culture Data:   Blood Culture   Date Value Ref Range Status   03/06/2019 No growth at less than 24 hours  Preliminary   03/06/2019 No growth at less than 24 hours  Preliminary     Urine Culture   Date Value Ref Range Status   03/06/2019 Culture in progress  Preliminary     No results found for: RESPCX  No results found for: WOUNDCX  No results found for: STOOLCX  No components found for: BODYFLD    Radiology Data:   Imaging Results (last 24 hours)     Procedure Component Value Units Date/Time    US Testicular or Ovarian Vascular Limited [[id number]] Updated:  03/07/19 1139    US Scrotum & Testicles [[id number]] Updated:  03/07/19 1139    XR Chest 1 View [[id number]] Collected:  03/06/19 2323     Updated:  03/07/19 0716    Narrative:       PROCEDURE: XR CHEST 1 VIEW    HISTORY: sepsis, A41.9 Sepsis, unspecified organism N39.0 Urinary  tract infection, site not specified    COMPARISON: 10/23/2015     TECHNIQUE:     Single projection of the chest was  done.    FINDINGS:  An artifact obscures the left CP angle . Stable parenchymal  scarring is seen in the left upper lung zone, unchanged since  10/23/2015    There are no discrete airspace infiltrates, pneumothoraces or  pleural effusions in the evaluated bilateral lung fields.    The pulmonary vascularity is normal.    The cardiomediastinal silhouette is stable.      Impression:         There is no acute pleural-parenchymal process seen in the imaged  lung fields.    Stable parenchymal scarring is seen in the left upper lung zone,  unchanged since 10/23/2015      Electronically signed by:  Brian Carvajal MD  3/7/2019 7:15 AM CST  Workstation: [id number]    CT Head Without Contrast [[id number]] Collected:  03/07/19 0000     Updated:  03/07/19 0033    Narrative:       Exam: Head CT without contrast.    INDICATION: Confusion state    TECHNIQUE: Routine head CT without contrast. Sagittal coronal  reconstructions were obtained. This exam was performed according  to our departmental dose-optimization program, which includes  automated exposure control, adjustment of the mA and/or kV  according to patient size and/or use of iterative reconstruction  technique.    COMPARISON: 10/23/2015    FINDINGS: The bony calvarium is intact. Mild mucosal thickening  is ethmoid and frontal sinuses. There is a small amount of fluid  in the sphenoid sinuses. Mastoid air cells are clear. No  extra-axial fluid collection. Enlargement of the ventricles and  sulci compatible with mild generalized cerebral atrophy.  Gray-white differentiation is maintained. There is attenuation  white matter consistent moderate ischemic changes. An old lacunar  infarct is present in the right basal ganglia and no obvious sign  of acute infarction. No intraparenchymal hemorrhage, mass or  midline shift.       Impression:       No obvious acute intracranial abnormality. Recommend  follow-up as clinically warranted.    Electronically signed by:  Uri Ward  MD  3/7/2019 12:32 AM  CST Workstation: [id number]          I have reviewed the patient's current medications.     Assessment/Plan     Active Hospital Problems    Diagnosis   • **Acute UTI (urinary tract infection)   • Encephalopathy, metabolic   • Hyponatremia   • Type 2 diabetes mellitus (CMS/HCC)   • History of DVT (deep vein thrombosis)   • Essential hypertension   • Hypokalemia   • Phimosis     Added automatically from request for surgery [id number]         Plan:    Continue vancomycin and zosyn  Follow cultures  Urology consultation  Ultrasound scrotum  Continue Almeida  Replace potassium by protocol  Pharmacy to dose coumadin  PT/OT evaluation        This document has been electronically signed by KRISTINA Stack on March 7, 2019 2:15 PM

## 2019-03-07 NOTE — H&P
[facility] Medicine Admission      Date of Admission: 3/6/2019      Primary Care Physician: Sony Alvarado MD      Chief Complaint: penile pain     HPI:  Patient is a poor historian. Family at bedside.   This is a 85 y/o who presents w/ complaints of penile pain & tenderness. He is s/p circumcision circa 2/27/19 2/2  balanitis & phimosis. Patients family is also stating that he is becoming more confused. The circumcision was also complicated by urinary retention. There is also increasing blood in the urine. Hematuria worsening.  He was on cipro therapy. +fatigue. Pt also saying there is worsening swelling over the site of circumcision & increasing redness. Denies fever, chills, night sweats, chest pain, dyspnea, palpitations, cough, abdominal pain, nausea, vomiting,  numbness, weakness, new focal deficits.     Concurrent Medical History:  has a past medical history of Anxiety, Asthma, Backache, Cataract, Cerebrovascular accident (CMS/HCC), Cerebrovascular disease, Cervical spondylosis, Chronic sinusitis, Coal workers' pneumoconiosis (CMS/HCC), COPD (chronic obstructive pulmonary disease) (CMS/HCC), Deep venous thrombosis of lower extremity (CMS/HCC), Degeneration of lumbar intervertebral disc, Depression, Diabetes mellitus (CMS/HCC), Dysphagia, Dyspnea, Edema of leg, Hypertension, Hypokalemia, Infarction of lung due to iatrogenic pulmonary embolism (CMS/HCC), Muscle weakness, Neck pain, On anticoagulant therapy, Pain in elbow, Pseudophakia, Psoriasis, S/P insertion of IVC (inferior vena caval) filter (01/29/2010), and Urinary incontinence.    Past Surgical History:   Past Surgical History:   Procedure Laterality Date   • CIRCUMCISION     • ENDOSCOPY  01/19/2001    Marked strictured region with mucosa at the gastroesophageal junction. 530.3   • EYE SURGERY Right 03/24/1999    CATARACT REMOVAL W/LENS IMPLANT   • PHALLOPLASTY, CIRCUMCISION N/A 2/27/2019    Procedure:  CIRCUMCISION;  Surgeon: Chana, Raza KEARNS MD;  Location: [facility];  Service: Urology       Family History:   Family History   Problem Relation Age of Onset   • Asthma Other    • Cancer Other    • Depression Other    • Diabetes Other    • Heart disease Other    • COPD Other    • Stroke Other    • Osteoarthritis Other    • Clotting disorder Other    • Seizures Other        Social History:   Social History     Socioeconomic History   • Marital status:      Spouse name: Not on file   • Number of children: Not on file   • Years of education: Not on file   • Highest education level: Not on file   Social Needs   • Financial resource strain: Not on file   • Food insecurity - worry: Not on file   • Food insecurity - inability: Not on file   • Transportation needs - medical: Not on file   • Transportation needs - non-medical: Not on file   Occupational History   • Not on file   Tobacco Use   • Smoking status: Never Smoker   • Smokeless tobacco: Current User     Types: Chew   • Tobacco comment: DOES NOT CURRENTLY SMOKE   Substance and Sexual Activity   • Alcohol use: No   • Drug use: No   • Sexual activity: Defer   Other Topics Concern   • Not on file   Social History Narrative   • Not on file       Allergies: No Known Allergies    Medications:   Prior to Admission medications    Medication Sig Start Date End Date Taking? Authorizing Provider   aspirin 81 MG EC tablet Take 81 mg by mouth Daily.    Yuriy Tavarez MD   citalopram (CeleXA) 40 MG tablet Take 40 mg by mouth Daily.    Yuriy Tavarez MD   Hydrocodone-Acetaminophen (LORTAB 10)  MG per tablet Take 1 tablet by mouth Every 6 (Six) Hours As Needed for moderate pain (4-6).    Yuriy Tavarez MD   lisinopril (PRINIVIL,ZESTRIL) 20 MG tablet Take 20 mg by mouth Daily.    Yuriy Tavarez MD   lovastatin (MEVACOR) 40 MG tablet Take 40 mg by mouth Every Night.    Yuriy Tavarez MD   oxyCODONE-acetaminophen (PERCOCET) 7.5-325 MG per  tablet Take 1-2 tablets by mouth Every 4 (Four) Hours As Needed (Pain). 2/27/19   Obion, Raza KEARNS MD   warfarin (COUMADIN) 1 MG tablet Take 1 mg by mouth Daily.    Provider, MD CHELSI Yan medications reviewed & discussed w/ patient.    Review of Systems:  Review of Systems   Constitutional: Positive for fatigue. Negative for chills, diaphoresis and fever.   HENT: Negative for congestion and sore throat.    Eyes: Negative for photophobia and visual disturbance.   Respiratory: Negative for cough, shortness of breath and wheezing.    Cardiovascular: Negative for chest pain and palpitations.   Gastrointestinal: Negative for abdominal distention and abdominal pain.   Endocrine: Negative for polyphagia and polyuria.   Genitourinary: Positive for hematuria and penile pain. Negative for dysuria.   Musculoskeletal: Positive for back pain. Negative for neck pain and neck stiffness.   Skin: Negative for color change.   Neurological: Negative for facial asymmetry and speech difficulty.   Hematological: Negative for adenopathy.   Psychiatric/Behavioral: Positive for confusion. Negative for agitation.      Otherwise ROS is negative except as mentioned above.    Physical Exam:   Temp:  [98.1 °F (36.7 °C)] 98.1 °F (36.7 °C)  Heart Rate:  [] 80  Resp:  [18] 18  BP: (118-127)/(60-80) 125/60  Physical Exam   Constitutional: He is oriented to person, place, and time. No distress.   HENT:   Head: Normocephalic and atraumatic.   Mouth/Throat: Oropharynx is clear and moist.   Eyes: Conjunctivae are normal. Pupils are equal, round, and reactive to light.   Neck: Neck supple.   Cardiovascular: Normal heart sounds. Exam reveals no friction rub.   Pulmonary/Chest: Effort normal.   Diffusely diminished air entry b/l     Abdominal: Soft. Bowel sounds are normal.   Genitourinary:   Genitourinary Comments: Sutures in place. no evidence of gross infection around penis. There is erythema. No tenderness. +Scrotal erythema but no  gross swelling. no evidence of forniers gangrene. No crepitus. No tenderness   Musculoskeletal: Normal range of motion.   Neurological: He is alert and oriented to person, place, and time.   Skin: Skin is warm. He is not diaphoretic. There is erythema.   Psychiatric: He has a normal mood and affect. His behavior is normal.   Vitals reviewed.        Results Reviewed:  I have personally reviewed current lab, radiology, and data.  Lab Results (last 24 hours)     Procedure Component Value Units Date/Time    Urine Culture - Urine, Urine, Clean Catch [[id number]] Collected:  03/06/19 2125    Specimen:  Urine, Clean Catch Updated:  03/06/19 2125    Extra Tubes [[id number]] Collected:  03/06/19 2010    Specimen:  Blood, Venous Line Updated:  03/06/19 2115    Narrative:       The following orders were created for panel order Extra Tubes.  Procedure                               Abnormality         Status                     ---------                               -----------         ------                     Gold Top - [facility][[id number]]                                   Final result                 Please view results for these tests on the individual orders.    Gold Top - [facility] [[id number]] Collected:  03/06/19 2010    Specimen:  Blood Updated:  03/06/19 2115     Extra Tube Hold for add-ons.     Comment: Auto resulted.       Lactic Acid, Plasma [[id number]]  (Abnormal) Collected:  03/06/19 2003    Specimen:  Blood Updated:  03/06/19 2114     Lactate 2.4 mmol/L     Lactic Acid, Reflex Timer (This will reflex a repeat order 3-3:15 hours after ordered.) [[id number]] Collected:  03/06/19 2003    Specimen:  Blood Updated:  03/06/19 2114    Blood Culture - Blood, Hand, Right [[id number]] Collected:  03/06/19 2027    Specimen:  Blood from Hand, Right Updated:  03/06/19 2049    Blood Culture - Blood, Arm, Right [[id number]] Collected:  03/06/19 2019    Specimen:  Blood from Arm, Right Updated:  03/06/19 2048    Urinalysis, Microscopic Only -  Urine, Catheter [[id number]]  (Abnormal) Collected:  03/06/19 1844    Specimen:  Urine, Catheter Updated:  03/06/19 2048     RBC, UA Too Numerous to Count /HPF      WBC, UA 6-12 /HPF      Bacteria, UA 1+ /HPF      Squamous Epithelial Cells, UA 0-2 /HPF      Yeast, UA       Moderate/2+ Budding Yeast w/Hyphae     /HPF     Hyaline Casts, UA None Seen /LPF      Methodology Manual Light Microscopy    Comprehensive Metabolic Panel [[id number]]  (Abnormal) Collected:  03/06/19 2003    Specimen:  Blood Updated:  03/06/19 2032     Glucose 252 mg/dL      BUN 10 mg/dL      Creatinine 0.81 mg/dL      Sodium 130 mmol/L      Potassium 3.0 mmol/L      Chloride 92 mmol/L      CO2 32.0 mmol/L      Calcium 8.4 mg/dL      Total Protein 6.7 g/dL      Albumin 3.40 g/dL      ALT (SGPT) 13 U/L      AST (SGOT) 20 U/L      Alkaline Phosphatase 97 U/L      Total Bilirubin 1.0 mg/dL      eGFR Non African Amer 91 mL/min/1.73      Globulin 3.3 gm/dL      A/G Ratio 1.0 g/dL      BUN/Creatinine Ratio 12.3     Anion Gap 6.0 mmol/L     Narrative:       The MDRD GFR formula is only valid for adults with stable renal function between ages 18 and 70.    Protime-INR [[id number]]  (Abnormal) Collected:  03/06/19 2003    Specimen:  Blood Updated:  03/06/19 2031     Protime 18.2 Seconds      INR 1.56    Narrative:       Therapeutic range for most indications is 2.0-3.0 INR,  or 2.5-3.5 for mechanical heart valves.    aPTT [[id number]]  (Normal) Collected:  03/06/19 2003    Specimen:  Blood Updated:  03/06/19 2031     PTT 36.8 seconds     Narrative:       The recommended Heparin therapeutic range is 68-97 seconds.    Urinalysis With Microscopic If Indicated (No Culture) - Urine, Catheter [[id number]]  (Abnormal) Collected:  03/06/19 1844    Specimen:  Urine, Catheter Updated:  03/06/19 2028     Color, UA Other     Comment: BROWN        Appearance, UA Turbid     pH, UA <=5.0     Specific Gravity, UA 1.015     Glucose, UA >=1000 mg/dL (3+)     Ketones, UA 15  mg/dL (1+)     Bilirubin, UA Moderate (2+)     Blood, UA Large (3+)     Protein, UA >=300 mg/dL (3+)     Leuk Esterase, UA Large (3+)     Nitrite, UA Positive     Urobilinogen, UA 1.0 E.U./dL    CBC & Differential [[id number]] Collected:  03/06/19 2003    Specimen:  Blood Updated:  03/06/19 2012    Narrative:       The following orders were created for panel order CBC & Differential.  Procedure                               Abnormality         Status                     ---------                               -----------         ------                     CBC Auto Differential[[id number]]        Abnormal            Final result                 Please view results for these tests on the individual orders.    CBC Auto Differential [[id number]]  (Abnormal) Collected:  03/06/19 2003    Specimen:  Blood Updated:  03/06/19 2012     WBC 9.82 10*3/mm3      RBC 4.64 10*6/mm3      Hemoglobin 14.9 g/dL      Hematocrit 42.9 %      MCV 92.5 fL      MCH 32.1 pg      MCHC 34.7 g/dL      RDW 14.4 %      RDW-SD 48.9 fl      MPV 9.4 fL      Platelets 223 10*3/mm3      Neutrophil % 78.3 %      Lymphocyte % 14.2 %      Monocyte % 6.2 %      Eosinophil % 0.5 %      Basophil % 0.6 %      Immature Grans % 0.2 %      Neutrophils, Absolute 7.69 10*3/mm3      Lymphocytes, Absolute 1.39 10*3/mm3      Monocytes, Absolute 0.61 10*3/mm3      Eosinophils, Absolute 0.05 10*3/mm3      Basophils, Absolute 0.06 10*3/mm3      Immature Grans, Absolute 0.02 10*3/mm3      nRBC 0.0 /100 WBC         Imaging Results (last 24 hours)     ** No results found for the last 24 hours. **            Assessment:        #Acute Encephalopathy likely 2/2 Acute UTI  #Acute Complicated UTI w/ Hematuria   #Hx of balanitis & phimosis requiring circumcision circa 2/27/19; no evidence of gross infection around penis. There is erythema. No tenderness. Scrotal erythema but no gross swelling. no evidence of forniers gangrene. No crepitus. Dr. Zayas on board.    #Urinary  Incontinence   #Hyponatremia likely 2/2 hypovolemia  #Hypokalemia   #hx of DVT/PE; on coumadin. INR subtherapeutic. Wells score low  #DM  #HTN  #COPD      Plan:  -us scrotum  -urology consult; ED spoke w/ urology   -replace cline  -npo until cleared by urology   -ivf  -abx  -nebs  -hold all anti-htn drugs given relatively low-normal pressures & risk of decompensation   -pharmacy to dose coumadin; heparin sc for vet prophylaxis until INR is therapeutic   -blood cx, urine cx, procal, coags  -monitor cbg, iss  -I/o, daily wts  -monitor lytes; recheck/replace prn   -supplemental o2 to maintain o2 sat>92%  -tele  -gi prophylaxis: Pepcid  -dvt prophylaxis: as above    PTA Medications reconciled. Labs reviewed. Records reviewed. Case & plan of care was discussed w/ the patient. All concerns were addressed & questions were answered.     Discharge Planning: depends on hospital course    Prognosis: guarded    Miko Carbajal MD  03/06/19  11:03 PM

## 2019-03-07 NOTE — ED NOTES
Pt started having blood in urine today. Pt currently on Cipro but unable to tell why.      Nenita Ivory, ALEKSANDRA  03/06/19 5929

## 2019-03-07 NOTE — PLAN OF CARE
Problem: Patient Care Overview  Goal: Plan of Care Review  Outcome: Ongoing (interventions implemented as appropriate)   03/07/19 0218   Coping/Psychosocial   Plan of Care Reviewed With patient   Plan of Care Review   Progress no change   OTHER   Outcome Summary Patient currently resting with no complaints. VSS. Will continue to monitor.      Goal: Individualization and Mutuality  Outcome: Ongoing (interventions implemented as appropriate)    Goal: Discharge Needs Assessment  Outcome: Ongoing (interventions implemented as appropriate)    Goal: Interprofessional Rounds/Family Conf  Outcome: Ongoing (interventions implemented as appropriate)      Problem: Fall Risk (Adult)  Goal: Identify Related Risk Factors and Signs and Symptoms  Outcome: Ongoing (interventions implemented as appropriate)    Goal: Absence of Fall  Outcome: Outcome(s) achieved Date Met: 03/07/19      Problem: Skin Injury Risk (Adult)  Goal: Identify Related Risk Factors and Signs and Symptoms  Outcome: Ongoing (interventions implemented as appropriate)    Goal: Skin Health and Integrity  Outcome: Ongoing (interventions implemented as appropriate)      Problem: Urinary Tract Infection (Adult)  Goal: Signs and Symptoms of Listed Potential Problems Will be Absent, Minimized or Managed (Urinary Tract Infection)  Outcome: Ongoing (interventions implemented as appropriate)

## 2019-03-07 NOTE — ED PROVIDER NOTES
Subjective   84 years old male with history of CVA with residual left-sided weakness, COPD, hypertension, hyperlipidemia, pulmonary embolism status post IVC filter placement, on Coumadin, recent circumcision on 2/27/19 with resultant urinary retention and is currently having catheter is brought in the ER with increasing blood in the urine.  He was also confused 3 days ago and is currently on ciprofloxacin.  His hematuria is worsening.  Patient feels more tired and lethargic than usual as per daughter.  Patient is not a good historian and history is limited, obtained from daughter.  She also reported increasing swelling, redness of glans.        History provided by:  Patient and relative      Review of Systems   Constitutional: Positive for fatigue.   HENT: Negative for congestion and sinus pressure.    Respiratory: Negative for chest tightness and shortness of breath.    Gastrointestinal: Negative for abdominal pain and vomiting.   Genitourinary: Positive for hematuria and penile pain. Negative for flank pain.   Musculoskeletal: Positive for back pain.   Skin: Negative for pallor.   Neurological: Positive for weakness. Negative for syncope and headaches.   Psychiatric/Behavioral: Positive for confusion.       Past Medical History:   Diagnosis Date   • Anxiety    • Asthma    • Backache     Lumbarsacral radiculopathy      • Cataract    • Cerebrovascular accident (CMS/HCC)     R thalamic w L side residual hemiparesis      • Cerebrovascular disease    • Cervical spondylosis     C5-6,C6-C7 with traction spurs at L2and L3   • Chronic sinusitis    • Coal workers' pneumoconiosis (CMS/HCC)    • COPD (chronic obstructive pulmonary disease) (CMS/HCC)    • Deep venous thrombosis of lower extremity (CMS/HCC)     RECURRENT   • Degeneration of lumbar intervertebral disc    • Depression    • Diabetes mellitus (CMS/HCC)     Hypoglycemic state in diabetes      • Dysphagia    • Dyspnea    • Edema of leg    • Hypertension    •  Hypokalemia    • Infarction of lung due to iatrogenic pulmonary embolism (CMS/HCC)     BILATERAL   • Muscle weakness     RESIDUAL LEFT-SIDED   • Neck pain    • On anticoagulant therapy    • Pain in elbow    • Pseudophakia    • Psoriasis    • S/P insertion of IVC (inferior vena caval) filter 01/29/2010    Geoff pul emboli   • Urinary incontinence        No Known Allergies    Past Surgical History:   Procedure Laterality Date   • CIRCUMCISION     • ENDOSCOPY  01/19/2001    Marked strictured region with mucosa at the gastroesophageal junction. 530.3   • EYE SURGERY Right 03/24/1999    CATARACT REMOVAL W/LENS IMPLANT   • PHALLOPLASTY, CIRCUMCISION N/A 2/27/2019    Procedure: CIRCUMCISION;  Surgeon: Chana, Raza KEARNS MD;  Location: [facility];  Service: Urology       Family History   Problem Relation Age of Onset   • Asthma Other    • Cancer Other    • Depression Other    • Diabetes Other    • Heart disease Other    • COPD Other    • Stroke Other    • Osteoarthritis Other    • Clotting disorder Other    • Seizures Other        Social History     Socioeconomic History   • Marital status:      Spouse name: Not on file   • Number of children: Not on file   • Years of education: Not on file   • Highest education level: Not on file   Tobacco Use   • Smoking status: Never Smoker   • Smokeless tobacco: Current User     Types: Chew   • Tobacco comment: DOES NOT CURRENTLY SMOKE   Substance and Sexual Activity   • Alcohol use: No   • Drug use: No   • Sexual activity: Defer           Objective   Physical Exam   Constitutional: He appears well-developed and well-nourished.   HENT:   Head: Normocephalic and atraumatic.   Nose: Nose normal.   Mouth/Throat: Oropharynx is clear and moist.   Eyes: Conjunctivae are normal.   Neck: Normal range of motion.   Cardiovascular: Normal rate, regular rhythm and normal heart sounds.   Pulmonary/Chest: Effort normal and breath sounds normal. He has no wheezes.   Abdominal: Soft. There is no  tenderness. No hernia.   Genitourinary: Testes normal. Cremasteric reflex is present. Circumcised. Penile tenderness present.         Musculoskeletal: Normal range of motion.   Neurological: He is alert.   Skin: Skin is warm. Capillary refill takes less than 2 seconds.   Nursing note and vitals reviewed.      Procedures           ED Course                  MDM  Number of Diagnoses or Management Options  Sepsis secondary to UTI (CMS/[facility]):   Diagnosis management comments: Patient has sepsis UTI.  He has outpatient treatment failure on Cipro.  I would start him on Zosyn.  Discussed with Dr. Carbajal and patient is admitted.  Discussed with Dr. Zayas and patient would be evaluated in the hospital.       Amount and/or Complexity of Data Reviewed  Clinical lab tests: ordered and reviewed        Labs Reviewed   URINALYSIS W/ MICROSCOPIC IF INDICATED (NO CULTURE) - Abnormal; Notable for the following components:       Result Value    Color, UA Other (*)     Appearance, UA Turbid (*)     Glucose, UA >=1000 mg/dL (3+) (*)     Ketones, UA 15 mg/dL (1+) (*)     Bilirubin, UA Moderate (2+) (*)     Blood, UA Large (3+) (*)     Protein, UA >=300 mg/dL (3+) (*)     Leuk Esterase, UA Large (3+) (*)     Nitrite, UA Positive (*)     All other components within normal limits   PROTIME-INR - Abnormal; Notable for the following components:    Protime 18.2 (*)     INR 1.56 (*)     All other components within normal limits    Narrative:     Therapeutic range for most indications is 2.0-3.0 INR,  or 2.5-3.5 for mechanical heart valves.   COMPREHENSIVE METABOLIC PANEL - Abnormal; Notable for the following components:    Glucose 252 (*)     Sodium 130 (*)     Potassium 3.0 (*)     Chloride 92 (*)     CO2 32.0 (*)     ALT (SGPT) 13 (*)     A/G Ratio 1.0 (*)     All other components within normal limits    Narrative:     The MDRD GFR formula is only valid for adults with stable renal function between ages 18 and 70.   LACTIC ACID, PLASMA -  Abnormal; Notable for the following components:    Lactate 2.4 (*)     All other components within normal limits   CBC WITH AUTO DIFFERENTIAL - Abnormal; Notable for the following components:    Neutrophil % 78.3 (*)     Lymphocyte % 14.2 (*)     Neutrophils, Absolute 7.69 (*)     All other components within normal limits   URINALYSIS, MICROSCOPIC ONLY - Abnormal; Notable for the following components:    RBC, UA Too Numerous to Count (*)     WBC, UA 6-12 (*)     Bacteria, UA 1+ (*)     All other components within normal limits   APTT - Normal    Narrative:     The recommended Heparin therapeutic range is 68-97 seconds.   URINE CULTURE   BLOOD CULTURE   BLOOD CULTURE   LACTIC ACID REFLEX TIMER   CBC AND DIFFERENTIAL    Narrative:     The following orders were created for panel order CBC & Differential.  Procedure                               Abnormality         Status                     ---------                               -----------         ------                     CBC Auto Differential[[id number]]        Abnormal            Final result                 Please view results for these tests on the individual orders.   EXTRA TUBES    Narrative:     The following orders were created for panel order Extra Tubes.  Procedure                               Abnormality         Status                     ---------                               -----------         ------                     Gold Top - [facility][[id number]]                                   Final result                 Please view results for these tests on the individual orders.   GOLD TOP - [facility]       No results found.      Final diagnoses:   Sepsis secondary to UTI (CMS/[facility])            Maikol Rodriguez MD  03/06/19 2320

## 2019-03-07 NOTE — PROGRESS NOTES
"Anticoagulation by Pharmacy - Warfarin    Arnoldo Guzman is a 84 y.o.male  [Ht: 172.7 cm (68\"); Wt: 68.4 kg (150 lb 12.8 oz)] continued on Warfarin 1 mg PO  for indication of DVT/PE.    Goal INR: 2-3  Today's INR:   Lab Results   Component Value Date    INR 1.51 (H) 03/07/2019         Lab Results   Component Value Date    INR 1.51 (H) 03/07/2019    INR 1.56 (H) 03/06/2019    INR 2.2 10/23/2015    PROTIME 17.7 (H) 03/07/2019    PROTIME 18.2 (H) 03/06/2019    PROTIME 24.0 (H) 10/23/2015     Lab Results   Component Value Date    HGB 13.3 03/07/2019    HGB 14.9 03/06/2019    HGB 16.1 10/23/2015     Lab Results   Component Value Date    HCT 38.6 03/07/2019    HCT 42.9 03/06/2019    HCT 46.1 10/23/2015     Lab Results   Component Value Date     03/07/2019     03/06/2019     (L) 10/23/2015       Recent Warfarin Administrations       The 5 most recent administrations since 02/28/2019 are shown below each listed medication.    Warfarin Sodium         Order Dose Date Given     warfarin (COUMADIN) tablet 1 mg 1 mg 03/07/2019                      Assessment/Plan:  INR 1.51, subtherapeutic on admission.  Warfarin 1mg given 3/7 @ 0038  PTA: Warfarin 1mg per admission tab  Concurrent meds: ASA, citalopram    Increase Warfarin to 1.5mg daily  PT/INR RHINA Shafer, [facility]  03/07/19 12:34 PM     "

## 2019-03-07 NOTE — PLAN OF CARE
Problem: Patient Care Overview  Goal: Plan of Care Review   03/07/19 1603   Coping/Psychosocial   Plan of Care Reviewed With patient   Plan of Care Review   Progress no change   OTHER   Outcome Summary Pt has been in pain this afternoon; morphine adminstered. Potassium replacement has been administered per protocol; will continue to monitor.      Goal: Individualization and Mutuality  Outcome: Ongoing (interventions implemented as appropriate)      Problem: Fall Risk (Adult)  Goal: Identify Related Risk Factors and Signs and Symptoms  Outcome: Outcome(s) achieved Date Met: 03/07/19      Problem: Skin Injury Risk (Adult)  Goal: Identify Related Risk Factors and Signs and Symptoms  Outcome: Outcome(s) achieved Date Met: 03/07/19    Goal: Skin Health and Integrity  Outcome: Ongoing (interventions implemented as appropriate)      Problem: Urinary Tract Infection (Adult)  Goal: Signs and Symptoms of Listed Potential Problems Will be Absent, Minimized or Managed (Urinary Tract Infection)  Outcome: Ongoing (interventions implemented as appropriate)      Problem: Pain, Acute (Adult)  Goal: Identify Related Risk Factors and Signs and Symptoms  Outcome: Outcome(s) achieved Date Met: 03/07/19    Goal: Acceptable Pain Control/Comfort Level  Outcome: Ongoing (interventions implemented as appropriate)

## 2019-03-08 LAB
ALBUMIN SERPL-MCNC: 2.7 G/DL (ref 3.4–4.8)
ALBUMIN/GLOB SERPL: 0.9 G/DL (ref 1.1–1.8)
ALP SERPL-CCNC: 67 U/L (ref 38–126)
ALT SERPL W P-5'-P-CCNC: 10 U/L (ref 21–72)
ANION GAP SERPL CALCULATED.3IONS-SCNC: 6 MMOL/L (ref 5–15)
AST SERPL-CCNC: 22 U/L (ref 17–59)
BACTERIA SPEC AEROBE CULT: ABNORMAL
BASOPHILS # BLD AUTO: 0.09 10*3/MM3 (ref 0–0.2)
BASOPHILS NFR BLD AUTO: 1 % (ref 0–1.5)
BILIRUB SERPL-MCNC: 1 MG/DL (ref 0.2–1.3)
BUN BLD-MCNC: 7 MG/DL (ref 7–21)
BUN/CREAT SERPL: 5.9 (ref 7–25)
CALCIUM SPEC-SCNC: 7.7 MG/DL (ref 8.4–10.2)
CHLORIDE SERPL-SCNC: 103 MMOL/L (ref 95–110)
CO2 SERPL-SCNC: 27 MMOL/L (ref 22–31)
CREAT BLD-MCNC: 1.18 MG/DL (ref 0.7–1.3)
DEPRECATED RDW RBC AUTO: 48 FL (ref 37–54)
EOSINOPHIL # BLD AUTO: 0.17 10*3/MM3 (ref 0–0.4)
EOSINOPHIL NFR BLD AUTO: 1.8 % (ref 0.3–6.2)
ERYTHROCYTE [DISTWIDTH] IN BLOOD BY AUTOMATED COUNT: 14.2 % (ref 12.3–15.4)
GFR SERPL CREATININE-BSD FRML MDRD: 59 ML/MIN/1.73 (ref 42–98)
GLOBULIN UR ELPH-MCNC: 2.9 GM/DL (ref 2.3–3.5)
GLUCOSE BLD-MCNC: 113 MG/DL (ref 60–100)
GLUCOSE BLDC GLUCOMTR-MCNC: 119 MG/DL (ref 70–130)
GLUCOSE BLDC GLUCOMTR-MCNC: 130 MG/DL (ref 70–130)
GLUCOSE BLDC GLUCOMTR-MCNC: 131 MG/DL (ref 70–130)
GLUCOSE BLDC GLUCOMTR-MCNC: 148 MG/DL (ref 70–130)
HCT VFR BLD AUTO: 36.6 % (ref 37.5–51)
HGB BLD-MCNC: 12.9 G/DL (ref 13–17.7)
IMM GRANULOCYTES # BLD AUTO: 0.03 10*3/MM3 (ref 0–0.05)
IMM GRANULOCYTES NFR BLD AUTO: 0.3 % (ref 0–0.5)
INR PPP: 1.8 (ref 0.8–1.2)
LYMPHOCYTES # BLD AUTO: 1.48 10*3/MM3 (ref 0.7–3.1)
LYMPHOCYTES NFR BLD AUTO: 16.1 % (ref 19.6–45.3)
MCH RBC QN AUTO: 32.6 PG (ref 26.6–33)
MCHC RBC AUTO-ENTMCNC: 35.2 G/DL (ref 31.5–35.7)
MCV RBC AUTO: 92.4 FL (ref 79–97)
MONOCYTES # BLD AUTO: 0.69 10*3/MM3 (ref 0.1–0.9)
MONOCYTES NFR BLD AUTO: 7.5 % (ref 5–12)
NEUTROPHILS # BLD AUTO: 6.73 10*3/MM3 (ref 1.4–7)
NEUTROPHILS NFR BLD AUTO: 73.3 % (ref 42.7–76)
NRBC BLD AUTO-RTO: 0 /100 WBC (ref 0–0)
PLATELET # BLD AUTO: 200 10*3/MM3 (ref 140–450)
PMV BLD AUTO: 9.4 FL (ref 6–12)
POTASSIUM BLD-SCNC: 3.7 MMOL/L (ref 3.5–5.1)
PROT SERPL-MCNC: 5.6 G/DL (ref 6.3–8.6)
PROTHROMBIN TIME: 20.2 SECONDS (ref 11.1–15.3)
RBC # BLD AUTO: 3.96 10*6/MM3 (ref 4.14–5.8)
SODIUM BLD-SCNC: 136 MMOL/L (ref 137–145)
WBC NRBC COR # BLD: 9.19 10*3/MM3 (ref 3.4–10.8)

## 2019-03-08 PROCEDURE — 97162 PT EVAL MOD COMPLEX 30 MIN: CPT | Performed by: PHYSICAL THERAPIST

## 2019-03-08 PROCEDURE — 94799 UNLISTED PULMONARY SVC/PX: CPT

## 2019-03-08 PROCEDURE — 99232 SBSQ HOSP IP/OBS MODERATE 35: CPT | Performed by: PSYCHIATRY & NEUROLOGY

## 2019-03-08 PROCEDURE — 82962 GLUCOSE BLOOD TEST: CPT

## 2019-03-08 PROCEDURE — 93010 ELECTROCARDIOGRAM REPORT: CPT | Performed by: INTERNAL MEDICINE

## 2019-03-08 PROCEDURE — 25010000002 PIPERACILLIN SOD-TAZOBACTAM PER 1 G: Performed by: INTERNAL MEDICINE

## 2019-03-08 PROCEDURE — 80053 COMPREHEN METABOLIC PANEL: CPT | Performed by: NURSE PRACTITIONER

## 2019-03-08 PROCEDURE — 94760 N-INVAS EAR/PLS OXIMETRY 1: CPT

## 2019-03-08 PROCEDURE — 85610 PROTHROMBIN TIME: CPT | Performed by: INTERNAL MEDICINE

## 2019-03-08 PROCEDURE — 85025 COMPLETE CBC W/AUTO DIFF WBC: CPT | Performed by: NURSE PRACTITIONER

## 2019-03-08 PROCEDURE — 93005 ELECTROCARDIOGRAM TRACING: CPT | Performed by: NURSE PRACTITIONER

## 2019-03-08 RX ORDER — FLUOXETINE 10 MG/1
10 CAPSULE ORAL DAILY
Status: COMPLETED | OUTPATIENT
Start: 2019-03-08 | End: 2019-03-09

## 2019-03-08 RX ORDER — CEFDINIR 300 MG/1
300 CAPSULE ORAL EVERY 12 HOURS SCHEDULED
Status: DISCONTINUED | OUTPATIENT
Start: 2019-03-08 | End: 2019-03-11 | Stop reason: HOSPADM

## 2019-03-08 RX ORDER — FLUCONAZOLE 200 MG/1
200 TABLET ORAL DAILY
Status: DISCONTINUED | OUTPATIENT
Start: 2019-03-08 | End: 2019-03-11 | Stop reason: HOSPADM

## 2019-03-08 RX ADMIN — SODIUM CHLORIDE, PRESERVATIVE FREE 3 ML: 5 INJECTION INTRAVENOUS at 20:44

## 2019-03-08 RX ADMIN — IPRATROPIUM BROMIDE AND ALBUTEROL SULFATE 3 ML: 2.5; .5 SOLUTION RESPIRATORY (INHALATION) at 01:20

## 2019-03-08 RX ADMIN — ASPIRIN 81 MG: 81 TABLET, COATED ORAL at 12:47

## 2019-03-08 RX ADMIN — SODIUM CHLORIDE 100 ML/HR: 900 INJECTION, SOLUTION INTRAVENOUS at 01:11

## 2019-03-08 RX ADMIN — NYSTATIN AND TRIAMCINOLONE ACETONIDE: 100000; 1 CREAM TOPICAL at 12:47

## 2019-03-08 RX ADMIN — PIPERACILLIN SODIUM,TAZOBACTAM SODIUM 3.38 G: 3; .375 INJECTION, POWDER, FOR SOLUTION INTRAVENOUS at 04:01

## 2019-03-08 RX ADMIN — Medication 1.5 MG: at 18:05

## 2019-03-08 RX ADMIN — FLUOXETINE HYDROCHLORIDE 10 MG: 10 CAPSULE ORAL at 20:43

## 2019-03-08 RX ADMIN — DOCUSATE SODIUM 100 MG: 100 CAPSULE, LIQUID FILLED ORAL at 12:48

## 2019-03-08 RX ADMIN — POTASSIUM CHLORIDE 40 MEQ: 750 CAPSULE, EXTENDED RELEASE ORAL at 04:01

## 2019-03-08 RX ADMIN — NYSTATIN AND TRIAMCINOLONE ACETONIDE: 100000; 1 CREAM TOPICAL at 20:44

## 2019-03-08 RX ADMIN — CEFDINIR 300 MG: 300 CAPSULE ORAL at 20:44

## 2019-03-08 RX ADMIN — SILVER SULFADIAZINE: 10 CREAM TOPICAL at 12:47

## 2019-03-08 RX ADMIN — CEFDINIR 300 MG: 300 CAPSULE ORAL at 18:14

## 2019-03-08 RX ADMIN — IPRATROPIUM BROMIDE AND ALBUTEROL SULFATE 3 ML: 2.5; .5 SOLUTION RESPIRATORY (INHALATION) at 08:18

## 2019-03-08 RX ADMIN — NYSTATIN: 100000 POWDER TOPICAL at 20:44

## 2019-03-08 RX ADMIN — TAMSULOSIN HYDROCHLORIDE 0.4 MG: 0.4 CAPSULE ORAL at 20:44

## 2019-03-08 RX ADMIN — ATORVASTATIN CALCIUM 10 MG: 10 TABLET, FILM COATED ORAL at 12:48

## 2019-03-08 RX ADMIN — FLUCONAZOLE 200 MG: 200 TABLET ORAL at 12:47

## 2019-03-08 RX ADMIN — FAMOTIDINE 40 MG: 40 TABLET ORAL at 12:48

## 2019-03-08 NOTE — NURSING NOTE
Notified Dr. Carbajal that patient is agitated and trying to climb out of the bed.  I have called the patient's daughter and they will not come up here tonight and sit with him.  I even let him talk to the daughter on the phone and it didn't do any good. Patient is calling out for his wife and wants to go home.  Unable to reorient him.  New order received and noted.

## 2019-03-08 NOTE — PLAN OF CARE
Problem: Patient Care Overview  Goal: Plan of Care Review  Outcome: Ongoing (interventions implemented as appropriate)   19   Coping/Psychosocial   Plan of Care Reviewed With patient   OTHER   Outcome Summary PT eval completed, patient oriented only to person and , , when questioned about PLOF information may or may not be correct, equipment that he had may or may no be correct, was able to come to sit EOB with HOB up and mod assist of one, able to get sitting balance, requirerd mod to max assist of one to complete transfer from EOB to recliner, could benefit from skilled PT to regain and return to hihgest level of function in transfers and strengthening     Goal: Discharge Needs Assessment  Outcome: Ongoing (interventions implemented as appropriate)   19   Discharge Needs Assessment   Concerns to be Addressed discharge planning   Patient/Family Anticipates Transition to home with family   Patient/Family Anticipated Services at Transition none   Transportation Anticipated family or friend will provide   Equipment Needed After Discharge (unknown until know what he currently has)   Outpatient/Agency/Support Group Needs skilled nursing facility   Discharge Facility/Level of Care Needs home with home health;nursing facility, skilled   Current Discharge Risk physical impairment;chronically ill;cognitively impaired   Disability   Equipment Currently Used at Home wheelchair

## 2019-03-08 NOTE — CONSULTS
Consults    Patient Care Team:  Sony Alvarado MD as PCP - General    Chief complaint: retention of urine after circumcision UTI    Subjective     Mr. Guzman is an 84-year-old male consulted to Urology for retention of urine after circumcision performed by Dr. Zayas on 2/27/19 due to blantitis and phimosis and Almeida anchored the following morning due to retention of urine. Flomax was initiated at that time, he was taking Keflex and Mycolog and Silvadene cream also started. His glans is healing nicely but residual penile swelling is noted and coronal sutures in place, scrotal bruising is observed but not concerning. He is confused at present, daughters at bedside, report that symptoms worsening since circumcision. Hematuria clearing since admission. Afebrile. No abdominal or flank pain.       Urinary Tract Infection    This is a recurrent problem. The current episode started 1 to 4 weeks ago. The problem occurs every urination. The problem has been gradually worsening. There has been no fever. Associated symptoms include hematuria. Pertinent negatives include no discharge, flank pain, nausea, sweats or vomiting. He has tried antibiotics for the symptoms. The treatment provided no relief. His past medical history is significant for catheterization. There is no history of kidney stones.       Review of Systems   Unable to perform ROS: Mental status change   Gastrointestinal: Negative for nausea and vomiting.   Genitourinary: Positive for hematuria. Negative for flank pain.        Past Medical History:   Diagnosis Date   • Anxiety    • Asthma    • Backache     Lumbarsacral radiculopathy      • Cataract    • Cerebrovascular accident (CMS/HCC)     R thalamic w L side residual hemiparesis      • Cerebrovascular disease    • Cervical spondylosis     C5-6,C6-C7 with traction spurs at L2and L3   • Chronic sinusitis    • Coal workers' pneumoconiosis (CMS/HCC)    • COPD (chronic obstructive pulmonary disease)  (CMS/HCC)    • Deep venous thrombosis of lower extremity (CMS/HCC)     RECURRENT   • Degeneration of lumbar intervertebral disc    • Depression    • Diabetes mellitus (CMS/HCC)     Hypoglycemic state in diabetes      • Dysphagia    • Dyspnea    • Edema of leg    • Hypertension    • Hypokalemia    • Infarction of lung due to iatrogenic pulmonary embolism (CMS/HCC)     BILATERAL   • Muscle weakness     RESIDUAL LEFT-SIDED   • Neck pain    • On anticoagulant therapy    • Pain in elbow    • Pseudophakia    • Psoriasis    • S/P insertion of IVC (inferior vena caval) filter 01/29/2010    Geoff pul emboli   • Urinary incontinence    ,   Past Surgical History:   Procedure Laterality Date   • CIRCUMCISION     • ENDOSCOPY  01/19/2001    Marked strictured region with mucosa at the gastroesophageal junction. 530.3   • EYE SURGERY Right 03/24/1999    CATARACT REMOVAL W/LENS IMPLANT   • PHALLOPLASTY, CIRCUMCISION N/A 2/27/2019    Procedure: CIRCUMCISION;  Surgeon: Raza Zayas MD;  Location: [facility];  Service: Urology   ,   Family History   Problem Relation Age of Onset   • Asthma Other    • Cancer Other    • Depression Other    • Diabetes Other    • Heart disease Other    • COPD Other    • Stroke Other    • Osteoarthritis Other    • Clotting disorder Other    • Seizures Other    ,   Social History     Tobacco Use   • Smoking status: Never Smoker   • Smokeless tobacco: Current User     Types: Chew   • Tobacco comment: DOES NOT CURRENTLY SMOKE   Substance Use Topics   • Alcohol use: No   • Drug use: No   ,   Medications Prior to Admission   Medication Sig Dispense Refill Last Dose   • aspirin 81 MG EC tablet Take 81 mg by mouth Daily.   3/5/2019 at 2000   • citalopram (CeleXA) 40 MG tablet Take 40 mg by mouth Daily.   3/5/2019 at Unknown time   • Hydrocodone-Acetaminophen (LORTAB 10)  MG per tablet Take 1 tablet by mouth Every 6 (Six) Hours As Needed for moderate pain (4-6).   3/5/2019 at Unknown time   • lisinopril  (PRINIVIL,ZESTRIL) 20 MG tablet Take 20 mg by mouth Daily.   3/5/2019 at Unknown time   • lovastatin (MEVACOR) 40 MG tablet Take 40 mg by mouth Every Night.   3/5/2019 at Unknown time   • warfarin (COUMADIN) 1 MG tablet Take 1 mg by mouth Daily.   3/5/2019 at Unknown time   • oxyCODONE-acetaminophen (PERCOCET) 7.5-325 MG per tablet Take 1-2 tablets by mouth Every 4 (Four) Hours As Needed (Pain). 10 tablet 0         Allergies:  Patient has no known allergies.    Objective      Vital Signs  Temp:  [97.2 °F (36.2 °C)-98.8 °F (37.1 °C)] 98.7 °F (37.1 °C)  Heart Rate:  [71-94] 72  Resp:  [18] 18  BP: (120-153)/(60-80) 138/66    Physical Exam   Constitutional: He appears well-developed and well-nourished. No distress.   HENT:   Head: Normocephalic and atraumatic.   Right Ear: External ear normal.   Left Ear: External ear normal.   Eyes: Pupils are equal, round, and reactive to light. Right eye exhibits no discharge. Left eye exhibits no discharge. No scleral icterus.   Neck: Normal range of motion. No JVD present. No tracheal deviation present. No thyromegaly present.   Cardiovascular: Normal rate, regular rhythm and intact distal pulses.   Pulmonary/Chest: Effort normal and breath sounds normal. No stridor. No respiratory distress.   Abdominal: Soft. Bowel sounds are normal. He exhibits no distension. There is no tenderness.   Genitourinary: Right testis shows tenderness. Right testis shows no swelling. Left testis shows tenderness. Left testis shows no swelling. Circumcised. Penile erythema and penile tenderness present. No discharge found.   Genitourinary Comments: Mild penile edema with sutures present, healing after circumcision, scrotal bruising and tender to palpation    Musculoskeletal: He exhibits no edema, tenderness or deformity.   Neurological: He is alert.   Skin: Skin is warm and dry. Capillary refill takes 2 to 3 seconds. No rash noted. He is not diaphoretic. No erythema. No pallor.       Results Review:    Lab Results (last 24 hours)     Procedure Component Value Units Date/Time    POC Glucose Once [[id number]]  (Abnormal) Collected:  03/07/19 1656    Specimen:  Blood Updated:  03/07/19 1726     Glucose 179 mg/dL      Comment: RN NotifiedOperator: [id number] HARISH PARRFERMeter ID: [id number]       POC Glucose Once [[id number]]  (Abnormal) Collected:  03/07/19 1153    Specimen:  Blood Updated:  03/07/19 1223     Glucose 215 mg/dL      Comment: RN NotifiedOperator: [id number] HARISH BOYERNIFERMeter ID: [id number]       POC Glucose Once [[id number]]  (Abnormal) Collected:  03/07/19 0907    Specimen:  Blood Updated:  03/07/19 0919     Glucose 169 mg/dL      Comment: RN NotifiedOperator: [id number] HARISH BOYERNIFERMeter ID: [id number]       Blood Culture - Blood, Hand, Right [[id number]] Collected:  03/06/19 2027    Specimen:  Blood from Hand, Right Updated:  03/07/19 0901     Blood Culture No growth at less than 24 hours    Blood Culture - Blood, Arm, Right [[id number]] Collected:  03/06/19 2019    Specimen:  Blood from Arm, Right Updated:  03/07/19 0901     Blood Culture No growth at less than 24 hours    Hemoglobin A1c [[id number]]  (Abnormal) Collected:  03/07/19 0559    Specimen:  Blood Updated:  03/07/19 0711     Hemoglobin A1C 7.7 %     TSH [[id number]]  (Normal) Collected:  03/07/19 0559    Specimen:  Blood Updated:  03/07/19 0647     TSH 1.400 mIU/mL     Lipid Panel [[id number]]  (Abnormal) Collected:  03/07/19 0559    Specimen:  Blood Updated:  03/07/19 0641     Total Cholesterol 97 mg/dL      Triglycerides 81 mg/dL      HDL Cholesterol 32 mg/dL      LDL Cholesterol  53 mg/dL      LDL/HDL Ratio 1.53    Basic Metabolic Panel [[id number]]  (Abnormal) Collected:  03/07/19 0559    Specimen:  Blood Updated:  03/07/19 0628     Glucose 154 mg/dL      BUN 9 mg/dL      Creatinine 0.70 mg/dL      Sodium 134 mmol/L      Potassium 2.6 mmol/L      Chloride 101 mmol/L      CO2 30.0 mmol/L      Calcium 7.6 mg/dL      eGFR Non   Amer 107 mL/min/1.73      BUN/Creatinine Ratio 12.9     Anion Gap 3.0 mmol/L     Narrative:       The MDRD GFR formula is only valid for adults with stable renal function between ages 18 and 70.    Urine Culture - Urine, Urine, Clean Catch [[id number]]  (Normal) Collected:  03/06/19 2125    Specimen:  Urine, Clean Catch Updated:  03/07/19 0626     Urine Culture Culture in progress    Protime-INR [[id number]]  (Abnormal) Collected:  03/07/19 0559    Specimen:  Blood Updated:  03/07/19 0620     Protime 17.7 Seconds      INR 1.51    Narrative:       Therapeutic range for most indications is 2.0-3.0 INR,  or 2.5-3.5 for mechanical heart valves.    Lactic Acid, Plasma [[id number]]  (Normal) Collected:  03/07/19 0559    Specimen:  Blood Updated:  03/07/19 0620     Lactate 1.1 mmol/L     Magnesium [[id number]]  (Normal) Collected:  03/07/19 0559    Specimen:  Blood Updated:  03/07/19 0614     Magnesium 2.0 mg/dL     CBC Auto Differential [[id number]]  (Normal) Collected:  03/07/19 0559    Specimen:  Blood Updated:  03/07/19 0603     WBC 9.37 10*3/mm3      RBC 4.21 10*6/mm3      Hemoglobin 13.3 g/dL      Hematocrit 38.6 %      MCV 91.7 fL      MCH 31.6 pg      MCHC 34.5 g/dL      RDW 14.2 %      RDW-SD 48.1 fl      MPV 9.7 fL      Platelets 198 10*3/mm3      Neutrophil % 68.9 %      Lymphocyte % 22.2 %      Monocyte % 6.3 %      Eosinophil % 1.7 %      Basophil % 0.7 %      Immature Grans % 0.2 %      Neutrophils, Absolute 6.45 10*3/mm3      Lymphocytes, Absolute 2.08 10*3/mm3      Monocytes, Absolute 0.59 10*3/mm3      Eosinophils, Absolute 0.16 10*3/mm3      Basophils, Absolute 0.07 10*3/mm3      Immature Grans, Absolute 0.02 10*3/mm3      nRBC 0.0 /100 WBC     Urinalysis With Culture If Indicated - Urine, Clean Catch [[id number]]  (Abnormal) Collected:  03/07/19 0346    Specimen:  Urine, Clean Catch Updated:  03/07/19 0428     Color, UA Red     Appearance, UA Turbid     pH, UA 5.5     Specific Gravity, UA >=1.030      Glucose,  mg/dL (2+)     Ketones, UA 15 mg/dL (1+)     Bilirubin, UA Moderate (2+)     Blood, UA Large (3+)     Protein,  mg/dL (2+)     Leuk Esterase, UA Small (1+)     Nitrite, UA Negative     Urobilinogen, UA 1.0 E.U./dL    Urinalysis, Microscopic Only - Urine, Clean Catch [[id number]]  (Abnormal) Collected:  03/07/19 0346    Specimen:  Urine, Clean Catch Updated:  03/07/19 0427     RBC, UA Too Numerous to Count /HPF      WBC, UA 6-12 /HPF      Bacteria, UA None Seen /HPF      Squamous Epithelial Cells, UA 0-2 /HPF      Hyaline Casts, UA 0-2 /LPF      Methodology Manual Light Microscopy    Urine Culture - Urine, Urine, Clean Catch [[id number]] Collected:  03/07/19 0346    Specimen:  Urine, Clean Catch Updated:  03/07/19 0427    Lactic Acid, Reflex [[id number]]  (Normal) Collected:  03/07/19 0054    Specimen:  Blood Updated:  03/07/19 0118     Lactate 1.2 mmol/L     Lactic Acid, Reflex Timer (This will reflex a repeat order 3-3:15 hours after ordered.) [[id number]] Collected:  03/06/19 2003    Specimen:  Blood Updated:  03/07/19 0016     Extra Tube Hold for add-ons.     Comment: Auto resulted.       Procalcitonin [[id number]] Collected:  03/06/19 2307    Specimen:  Blood Updated:  03/06/19 2307    Extra Tubes [[id number]] Collected:  03/06/19 2010    Specimen:  Blood, Venous Line Updated:  03/06/19 2115    Narrative:       The following orders were created for panel order Extra Tubes.  Procedure                               Abnormality         Status                     ---------                               -----------         ------                     [facility] - [facility][[id number]]                                   Final result                 Please view results for these tests on the individual orders.    [facility] [[id number]] Collected:  03/06/19 2010    Specimen:  Blood Updated:  03/06/19 2115     Extra Tube Hold for add-ons.     Comment: Auto resulted.       Lactic Acid, Plasma [[id number]]   (Abnormal) Collected:  03/06/19 2003    Specimen:  Blood Updated:  03/06/19 2114     Lactate 2.4 mmol/L     Urinalysis, Microscopic Only - Urine, Catheter [[id number]]  (Abnormal) Collected:  03/06/19 1844    Specimen:  Urine, Catheter Updated:  03/06/19 2048     RBC, UA Too Numerous to Count /HPF      WBC, UA 6-12 /HPF      Bacteria, UA 1+ /HPF      Squamous Epithelial Cells, UA 0-2 /HPF      Yeast, UA       Moderate/2+ Budding Yeast w/Hyphae     /HPF     Hyaline Casts, UA None Seen /LPF      Methodology Manual Light Microscopy    Comprehensive Metabolic Panel [[id number]]  (Abnormal) Collected:  03/06/19 2003    Specimen:  Blood Updated:  03/06/19 2032     Glucose 252 mg/dL      BUN 10 mg/dL      Creatinine 0.81 mg/dL      Sodium 130 mmol/L      Potassium 3.0 mmol/L      Chloride 92 mmol/L      CO2 32.0 mmol/L      Calcium 8.4 mg/dL      Total Protein 6.7 g/dL      Albumin 3.40 g/dL      ALT (SGPT) 13 U/L      AST (SGOT) 20 U/L      Alkaline Phosphatase 97 U/L      Total Bilirubin 1.0 mg/dL      eGFR Non African Amer 91 mL/min/1.73      Globulin 3.3 gm/dL      A/G Ratio 1.0 g/dL      BUN/Creatinine Ratio 12.3     Anion Gap 6.0 mmol/L     Narrative:       The MDRD GFR formula is only valid for adults with stable renal function between ages 18 and 70.    Protime-INR [[id number]]  (Abnormal) Collected:  03/06/19 2003    Specimen:  Blood Updated:  03/06/19 2031     Protime 18.2 Seconds      INR 1.56    Narrative:       Therapeutic range for most indications is 2.0-3.0 INR,  or 2.5-3.5 for mechanical heart valves.    aPTT [[id number]]  (Normal) Collected:  03/06/19 2003    Specimen:  Blood Updated:  03/06/19 2031     PTT 36.8 seconds     Narrative:       The recommended Heparin therapeutic range is 68-97 seconds.    Urinalysis With Microscopic If Indicated (No Culture) - Urine, Catheter [[id number]]  (Abnormal) Collected:  03/06/19 1844    Specimen:  Urine, Catheter Updated:  03/06/19 2028     Color, UA Other     Comment:  BROWN        Appearance, UA Turbid     pH, UA <=5.0     Specific Gravity, UA 1.015     Glucose, UA >=1000 mg/dL (3+)     Ketones, UA 15 mg/dL (1+)     Bilirubin, UA Moderate (2+)     Blood, UA Large (3+)     Protein, UA >=300 mg/dL (3+)     Leuk Esterase, UA Large (3+)     Nitrite, UA Positive     Urobilinogen, UA 1.0 E.U./dL    CBC & Differential [[id number]] Collected:  03/06/19 2003    Specimen:  Blood Updated:  03/06/19 2012    Narrative:       The following orders were created for panel order CBC & Differential.  Procedure                               Abnormality         Status                     ---------                               -----------         ------                     CBC Auto Differential[[id number]]        Abnormal            Final result                 Please view results for these tests on the individual orders.    CBC Auto Differential [[id number]]  (Abnormal) Collected:  03/06/19 2003    Specimen:  Blood Updated:  03/06/19 2012     WBC 9.82 10*3/mm3      RBC 4.64 10*6/mm3      Hemoglobin 14.9 g/dL      Hematocrit 42.9 %      MCV 92.5 fL      MCH 32.1 pg      MCHC 34.7 g/dL      RDW 14.4 %      RDW-SD 48.9 fl      MPV 9.4 fL      Platelets 223 10*3/mm3      Neutrophil % 78.3 %      Lymphocyte % 14.2 %      Monocyte % 6.2 %      Eosinophil % 0.5 %      Basophil % 0.6 %      Immature Grans % 0.2 %      Neutrophils, Absolute 7.69 10*3/mm3      Lymphocytes, Absolute 1.39 10*3/mm3      Monocytes, Absolute 0.61 10*3/mm3      Eosinophils, Absolute 0.05 10*3/mm3      Basophils, Absolute 0.06 10*3/mm3      Immature Grans, Absolute 0.02 10*3/mm3      nRBC 0.0 /100 WBC          Imaging Results (last 24 hours)     Procedure Component Value Units Date/Time    US Scrotum & Testicles [[id number]] Collected:  03/07/19 1045     Updated:  03/07/19 1416    Narrative:       PROCEDURE: US SCROTUM AND TESTICLES (accession [id number]), US  TESTICULAR OR OVARIAN VASCULAR LIMITED (accession [id number])    Clinical  History: swelling, A41.9 Sepsis, unspecified organism  N39.0 Urinary tract infection, site not specified    Indication: Same as above.    Comparison: None .    Technique: Grayscale and color Doppler evaluation of the testes  and the scrotum was done    Findings:     The right testes measures 2.8 x 2.3 x 2.6  centimeters and the  left testes measures 2.4 x 1.9 x 2.7  centimeters.    The bilateral testes do not  show any evidence of infiltrative or  focal mass lesions. The bilateral testes show normal blood flow.    The bilateral epididymides are not well-visualized. There is  presence of small bilateral hydroceles    There are no varicoceles      Impression:       Impression:     Unremarkable bilateral testes.  The bilateral epididymides are not well-visualized. There is  presence of small bilateral hydroceles        Electronically signed by:  Brian Carvajal MD  3/7/2019 2:15 PM CST  Workstation: [id number]    US Testicular or Ovarian Vascular Limited [[id number]] Collected:  03/07/19 1045     Updated:  03/07/19 1416    Narrative:       PROCEDURE: US SCROTUM AND TESTICLES (accession [id number]), US  TESTICULAR OR OVARIAN VASCULAR LIMITED (accession [id number])    Clinical History: swelling, A41.9 Sepsis, unspecified organism  N39.0 Urinary tract infection, site not specified    Indication: Same as above.    Comparison: None .    Technique: Grayscale and color Doppler evaluation of the testes  and the scrotum was done    Findings:     The right testes measures 2.8 x 2.3 x 2.6  centimeters and the  left testes measures 2.4 x 1.9 x 2.7  centimeters.    The bilateral testes do not  show any evidence of infiltrative or  focal mass lesions. The bilateral testes show normal blood flow.    The bilateral epididymides are not well-visualized. There is  presence of small bilateral hydroceles    There are no varicoceles      Impression:       Impression:     Unremarkable bilateral testes.  The bilateral epididymides are not  well-visualized. There is  presence of small bilateral hydroceles        Electronically signed by:  Brian Carvajal MD  3/7/2019 2:15 PM CST  Workstation: [id number]    XR Chest 1 View [[id number]] Collected:  03/06/19 2323     Updated:  03/07/19 0716    Narrative:       PROCEDURE: XR CHEST 1 VIEW    HISTORY: sepsis, A41.9 Sepsis, unspecified organism N39.0 Urinary  tract infection, site not specified    COMPARISON: 10/23/2015     TECHNIQUE:     Single projection of the chest was done.    FINDINGS:  An artifact obscures the left CP angle . Stable parenchymal  scarring is seen in the left upper lung zone, unchanged since  10/23/2015    There are no discrete airspace infiltrates, pneumothoraces or  pleural effusions in the evaluated bilateral lung fields.    The pulmonary vascularity is normal.    The cardiomediastinal silhouette is stable.      Impression:         There is no acute pleural-parenchymal process seen in the imaged  lung fields.    Stable parenchymal scarring is seen in the left upper lung zone,  unchanged since 10/23/2015      Electronically signed by:  Brian Carvajal MD  3/7/2019 7:15 AM CST  Workstation: [id number]    CT Head Without Contrast [[id number]] Collected:  03/07/19 0000     Updated:  03/07/19 0033    Narrative:       Exam: Head CT without contrast.    INDICATION: Confusion state    TECHNIQUE: Routine head CT without contrast. Sagittal coronal  reconstructions were obtained. This exam was performed according  to our departmental dose-optimization program, which includes  automated exposure control, adjustment of the mA and/or kV  according to patient size and/or use of iterative reconstruction  technique.    COMPARISON: 10/23/2015    FINDINGS: The bony calvarium is intact. Mild mucosal thickening  is ethmoid and frontal sinuses. There is a small amount of fluid  in the sphenoid sinuses. Mastoid air cells are clear. No  extra-axial fluid collection. Enlargement of the ventricles and  sulci  compatible with mild generalized cerebral atrophy.  Gray-white differentiation is maintained. There is attenuation  white matter consistent moderate ischemic changes. An old lacunar  infarct is present in the right basal ganglia and no obvious sign  of acute infarction. No intraparenchymal hemorrhage, mass or  midline shift.       Impression:       No obvious acute intracranial abnormality. Recommend  follow-up as clinically warranted.    Electronically signed by:  Uri Ward MD  3/7/2019 12:32 AM  [facility] Workstation: [id number]           I reviewed the patient's new clinical results.  I reviewed the patient's new imaging results and agree with the interpretation.  I reviewed the patient's other test results and agree with the interpretation        Assessment/Plan       Acute UTI (urinary tract infection)    Phimosis    Sepsis secondary to UTI (CMS/HCC)    Encephalopathy, metabolic    Hyponatremia    Type 2 diabetes mellitus (CMS/HCC)    History of DVT (deep vein thrombosis)    Essential hypertension    Hypokalemia      Assessment & Plan    1. UTI cystitis with hematuria  2. 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 Hx of circumcision on 2/27/19    -WBC 9.37  -urine culture in progress  -Zosyn and vancomycin  -Estimated Creatinine Clearance: 66.5 mL/min (by C-G formula based on SCr of 0.7 mg/dL).   -Unremarkable scrotal//testicular ultrasound today    Plan:  Flomax  Continue Almeida until in AM then remove for voiding trial  Mycolog cream and Silvadene cream  Continue antibiotics and await urine culture result    I discussed the patient's findings and my recommendations with patient, family, nursing staff, primary care team and KRISTINA Montesinos  03/07/19  6:55 PM

## 2019-03-08 NOTE — THERAPY EVALUATION
Acute Care - Physical Therapy Initial Evaluation  [facility]     Patient Name: Arnoldo Guzman  : 1934  MRN: [id number]  Today's Date: 3/8/2019   Onset of Illness/Injury or Date of Surgery: 19  Date of Referral to PT: 19  Referring Physician: Rey ACEVEDO      Admit Date: 3/6/2019    Visit Dx:     ICD-10-CM ICD-9-CM   1. Sepsis secondary to UTI (CMS/HCC) A41.9 038.9    N39.0 995.91     599.0   2. Impaired physical mobility Z74.09 781.99     Patient Active Problem List   Diagnosis   • Phimosis   • Sepsis secondary to UTI (CMS/HCC)   • Acute UTI (urinary tract infection)   • Encephalopathy, metabolic   • Hyponatremia   • Type 2 diabetes mellitus (CMS/HCC)   • History of DVT (deep vein thrombosis)   • Essential hypertension   • Hypokalemia     Past Medical History:   Diagnosis Date   • Anxiety    • Asthma    • Backache     Lumbarsacral radiculopathy      • Cataract    • Cerebrovascular accident (CMS/HCC)     R thalamic w L side residual hemiparesis      • Cerebrovascular disease    • Cervical spondylosis     C5-6,C6-C7 with traction spurs at L2and L3   • Chronic sinusitis    • Coal workers' pneumoconiosis (CMS/HCC)    • COPD (chronic obstructive pulmonary disease) (CMS/HCC)    • Deep venous thrombosis of lower extremity (CMS/HCC)     RECURRENT   • Degeneration of lumbar intervertebral disc    • Depression    • Diabetes mellitus (CMS/HCC)     Hypoglycemic state in diabetes      • Dysphagia    • Dyspnea    • Edema of leg    • Hypertension    • Hypokalemia    • Infarction of lung due to iatrogenic pulmonary embolism (CMS/HCC)     BILATERAL   • Muscle weakness     RESIDUAL LEFT-SIDED   • Neck pain    • On anticoagulant therapy    • Pain in elbow    • Pseudophakia    • Psoriasis    • S/P insertion of IVC (inferior vena caval) filter 2010    Geoff pul emboli   • Urinary incontinence      Past Surgical History:   Procedure Laterality Date   • CIRCUMCISION     • ENDOSCOPY  2001     Marked strictured region with mucosa at the gastroesophageal junction. 530.3   • EYE SURGERY Right 03/24/1999    CATARACT REMOVAL W/LENS IMPLANT   • PHALLOPLASTY, CIRCUMCISION N/A 2/27/2019    Procedure: CIRCUMCISION;  Surgeon: Chana, Raza KEARNS MD;  Location: [facility];  Service: Urology        PT ASSESSMENT (last 12 hours)      Physical Therapy Evaluation     Row Name 03/08/19 0944          PT Evaluation Time/Intention    Subjective Information  no complaints  -CB     Document Type  evaluation  -CB     Mode of Treatment  individual therapy  -CB     Total Evaluation Minutes, Physical Therapy  35  -CB     Patient Effort  adequate  -CB     Symptoms Noted During/After Treatment  none  -CB     Row Name 03/08/19 0944          General Information    Patient Profile Reviewed?  yes  -CB     Onset of Illness/Injury or Date of Surgery  03/06/19  -CB     Referring Physician  Rey ACEVEDO  -CB     Patient Observations  cooperative;agree to therapy  -CB     Patient/Family Observations  no family present  -CB     General Observations of Patient  laying in bed with IV, cath SCD and telemetry  -CB     Prior Level of Function  independent:;gait;ADL's per atient - no one there to confirm  -CB     Equipment Currently Used at Home  wheelchair  -CB     Pertinent History of Current Functional Problem  difficult urination  -CB     Existing Precautions/Restrictions  fall  -CB     Limitations/Impairments  safety/cognitive  -CB     Risks Reviewed  patient:  -CB     Benefits Reviewed  patient:;improve function  -CB     Row Name 03/08/19 0944          Relationship/Environment    Lives With  spouse  -CB     Row Name 03/08/19 0944          Resource/Environmental Concerns    Current Living Arrangements  -- thinks he lives here- at hospital  -CB     Row Name 03/08/19 0944          Cognitive Assessment/Interventions    Additional Documentation  Cognitive Assessment/Intervention (Group)  -CB     Row Name 03/08/19 0969          Cognitive  Assessment/Intervention- PT/OT    Affect/Mental Status (Cognitive)  confused;flat/blunted affect  -CB     Orientation Status (Cognition)  oriented to;person;disoriented to;place;time   -CB     Follows Commands (Cognition)  follows one step commands;75-90% accuracy;increased processing time needed;physical/tactile prompts required  -CB     Safety Deficit (Cognitive)  mild deficit  -CB     Personal Safety Interventions  fall prevention program maintained;gait belt;nonskid shoes/slippers when out of bed  -CB     Row Name 1989          Safety Issues, Functional Mobility    Safety Issues Affecting Function (Mobility)  problem solving  -CB     Impairments Affecting Function (Mobility)  balance;cognition;endurance/activity tolerance;range of motion (ROM);strength  -CB     Row Name 19          Bed Mobility Assessment/Treatment    Bed Mobility Assessment/Treatment  supine-sit;sit-supine  -CB     Supine-Sit [place] (Bed Mobility)  minimum assist (75% patient effort);moderate assist (50% patient effort);1 person assist  -CB     Sit-Supine [place] (Bed Mobility)  minimum assist (75% patient effort);moderate assist (50% patient effort)  -CB     Bed Mobility, Safety Issues  decreased use of arms for pushing/pulling  -CB     Assistive Device (Bed Mobility)  bed rails;head of bed elevated  -CB     Row Name 19          Transfer Assessment/Treatment    Transfer Assessment/Treatment  bed-chair transfer;sit-stand transfer;stand-sit transfer  -CB     Bed-Chair [place] (Transfers)  moderate assist (50% patient effort);1 person assist  -CB     Assistive Device (Bed-Chair Transfers)  -- none  -CB     Sit-Stand [place] (Transfers)  1 person assist;moderate assist (50% patient effort)  -CB     Stand-Sit [place] (Transfers)  minimum assist (75% patient effort)  -CB     Row Name 1915          Sit-Stand Transfer    Assistive Device (Sit-Stand Transfers)  -- none  -CB     Row  Name 03/08/19 0944          Stand-Sit Transfer    Assistive Device (Stand-Sit Transfers)  -- none  -CB     Row Name 03/08/19 0944          General ROM    GENERAL ROM COMMENTS  AROM WFL BLE  -CB     Row Name 03/08/19 0944          MMT (Manual Muscle Testing)    General MMT Comments  grossly 3/5 LLE, 4/5 RLE  -CB     Row Name 03/08/19 0944          Sensory Assessment/Intervention    Sensory General Assessment  no sensation deficits identified  -CB     Row Name 03/08/19 0944          Vision Assessment/Intervention    Visual Impairment/Limitations  -- per patient - OK  -CB     Row Name 03/08/19 0944          Pain Assessment    Additional Documentation  Pain Scale: Numbers Pre/Post-Treatment (Group)  -CB     Row Name 03/08/19 0944          Pain Scale: Numbers Pre/Post-Treatment    Pain Scale: Numbers, Pretreatment  0/10 - no pain  -CB     Pain Scale: Numbers, Post-Treatment  0/10 - no pain  -CB     Row Name             Wound 02/27/19 1827 penis surgical;incision    Wound - Properties Group Date first assessed: 02/27/19  -CH Time first assessed: 1827  -CH Present On Admission : no  -CH Location: penis  -CH Type: surgical;incision  -CH    Row Name 03/08/19 0944          Plan of Care Review    Plan of Care Reviewed With  patient  -CB     Row Name 03/08/19 0944          Physical Therapy Clinical Impression    Date of Referral to PT  03/07/19  -CB     PT Diagnosis (PT Clinical Impression)  impaired physical mobility due to sepsis from UTI  -CB     Criteria for Skilled Interventions Met (PT Clinical Impression)  treatment indicated;yes  -CB     Pathology/Pathophysiology Noted (Describe Specifically for Each System)  musculoskeletal  -CB     Impairments Found (describe specific impairments)  aerobic capacity/endurance;arousal, attention, and cognition;gait, locomotion, and balance;ROM  -CB     Rehab Potential (PT Clinical Summary)  fair, will monitor progress closely  -CB     Predicted Duration of Therapy (PT)  until goals met  or discharged  -CB     Care Plan Review (PT)  care plan/treatment goals reviewed  -CB     Row Name 03/08/19 0944          Vital Signs    Pre Systolic BP Rehab  115  -CB     Pre Treatment Diastolic BP  65  -CB     Post Systolic BP Rehab  141  -CB     Post Treatment Diastolic BP  76  -CB     Pretreatment Heart Rate (beats/min)  84  -CB     Posttreatment Heart Rate (beats/min)  88  -CB     Pre SpO2 (%)  96  -CB     O2 Delivery Pre Treatment  room air  -CB     Post SpO2 (%)  97  -CB     O2 Delivery Post Treatment  room air  -CB     Pre Patient Position  Supine  -CB     Intra Patient Position  Sitting  -CB     Post Patient Position  Sitting  -CB     Row Name 03/08/19 0944          Physical Therapy Goals    Bed Mobility Goal Selection (PT)  bed mobility, PT goal 1  -CB     Transfer Goal Selection (PT)  transfer, PT goal 1  -CB     Gait Training Goal Selection (PT)  gait training, PT goal 1  -CB     Row Name 03/08/19 0944          Bed Mobility Goal 1 (PT)    Activity/Assistive Device (Bed Mobility Goal 1, PT)  sit to supine;supine to sit  -CB     [place] Level/Cues Needed (Bed Mobility Goal 1, PT)  supervision required  -CB     Time Frame (Bed Mobility Goal 1, PT)  3 days  -CB     Progress/Outcomes (Bed Mobility Goal 1, PT)  goal not met;goal ongoing  -CB     Row Name 03/08/19 0944          Transfer Goal 1 (PT)    Activity/Assistive Device (Transfer Goal 1, PT)  sit-to-stand/stand-to-sit;bed-to-chair/chair-to-bed;wheelchair transfer  -CB     [place] Level/Cues Needed (Transfer Goal 1, PT)  contact guard assist  -CB     Time Frame (Transfer Goal 1, PT)  2 - 3 days  -CB     Progress/Outcome (Transfer Goal 1, PT)  goal not met;goal ongoing  -CB     Row Name 03/08/19 0944          Gait Training Goal 1 (PT)    Activity/Assistive Device (Gait Training Goal 1, PT)  gait (walking locomotion);assistive device use;decrease fall risk;increase endurance/gait distance;walker, trey  -CB     [place] Level (Gait Training  Goal 1, PT)  contact guard assist  -CB     Distance (Gait Goal 1, PT)  5  -CB     Time Frame (Gait Training Goal 1, PT)  2 - 3 days  -CB     Progress/Outcome (Gait Training Goal 1, PT)  goal not met;goal ongoing  -CB     Row Name 03/08/19 0944          Patient Education Goal (PT)    Activity (Patient Education Goal, PT)  HEP  -CB     [place]/Cues/Accuracy (Memory Goal 2, PT)  demonstrates adequately;verbalizes understanding  -CB     Time Frame (Patient Education Goal, PT)  3 days  -CB     Progress/Outcome (Patient Education Goal, PT)  goal not met;goal ongoing  -CB     Row Name 03/08/19 0944          Positioning and Restraints    Pre-Treatment Position  in bed  -CB     Post Treatment Position  chair  -CB       User Key  (r) = Recorded By, (t) = Taken By, (c) = Cosigned By    Initials Name Provider Type    Amanda Stone, PT Physical Therapist    Kyara Mcclendon RN Registered Nurse        Physical Therapy Education     Title: PT OT SLP Therapies (In Progress)     Topic: Physical Therapy (In Progress)     Point: Mobility training (In Progress)     Learning Progress Summary           Patient Acceptance, E,D, NR by CB at 3/8/2019  1:03 PM                   Point: Precautions (In Progress)     Learning Progress Summary           Patient Acceptance, E,D, NR by CB at 3/8/2019  1:03 PM                               User Key     Initials Effective Dates Name Provider Type Discipline     04/06/17 -  Amanda Mccain, PT Physical Therapist PT              PT Recommendation and Plan  Anticipated Discharge Disposition (PT): home with home health, skilled nursing facility  Planned Therapy Interventions (PT Eval): bed mobility training, gait training, home exercise program, patient/family education, strengthening, transfer training  Therapy Frequency (PT Clinical Impression): other (see comments)(5-14)  Outcome Summary/Treatment Plan (PT)  Anticipated Equipment Needs at Discharge (PT): (unknown until known  what he already has)  Anticipated Discharge Disposition (PT): home with home health, skilled nursing facility  Plan of Care Reviewed With: patient  Outcome Summary: PT eval completed, patient oriented only to person and , , when questioned about PLOF information may or may not be correct, equipment that he had may or may no be correct, was able to come to sit EOB with HOB up and mod assist of one, able to get sitting balance, requirerd mod to max assist of one to complete transfer from EOB to recliner, could benefit from skilled PT to regain and return to hihgest level of function in  transfers and strengthening  Outcome Measures     Row Name 19 0944             How much help from another person do you currently need...    Turning from your back to your side while in flat bed without using bedrails?  2  -CB      Moving from lying on back to sitting on the side of a flat bed without bedrails?  2  -CB      Moving to and from a bed to a chair (including a wheelchair)?  2  -CB      Standing up from a chair using your arms (e.g., wheelchair, bedside chair)?  2  -CB      Climbing 3-5 steps with a railing?  1  -CB      To walk in hospital room?  2  -CB      AM-PAC 6 Clicks Score  11  -CB         Functional Assessment    Outcome Measure Options  AM-PAC 6 Clicks Basic Mobility (PT)  -CB        User Key  (r) = Recorded By, (t) = Taken By, (c) = Cosigned By    Initials Name Provider Type    Amanda Stone, PT Physical Therapist         Time Calculation:   PT Charges     Row Name 19 1313             Time Calculation    Start Time  0944  -CB      Stop Time  1019  -CB      Time Calculation (min)  35 min  -CB      PT Received On  19  -CB      PT Goal Re-Cert Due Date  19  -CB        User Key  (r) = Recorded By, (t) = Taken By, (c) = Cosigned By    Initials Name Provider Type    Amanda Stone, PT Physical Therapist        Therapy Suggested Charges     Code   Minutes Charges    None            Therapy Charges for Today     Code Description Service Date Service Provider Modifiers Qty    99317353631 HC PT EVAL MOD COMPLEXITY 2 3/8/2019 Amanda Mccain, PT GP 1          PT G-Codes  Outcome Measure Options: AM-PAC 6 Clicks Basic Mobility (PT)  AM-PAC 6 Clicks Score: 11      Amanda Mccain, PT  3/8/2019

## 2019-03-08 NOTE — PROGRESS NOTES
"   LOS: 1 day   Patient Care Team:  Sony Alvarado MD as PCP - General    Subjective     Subjective:  Symptoms:  Stable.  (Pale pink urine in Almeida's gravity bag. Nursing reports small clots throughout night. ).    Diet:  No nausea or vomiting.    Activity level: Impaired due to weakness.    Pain:  He reports no pain.        History taken from: patient chart RN    Objective     Vital Signs  Temp:  [97.6 °F (36.4 °C)-99.2 °F (37.3 °C)] 97.6 °F (36.4 °C)  Heart Rate:  [72-96] 94  Resp:  [16-18] 18  BP: (107-140)/(57-82) 140/78    Objective:  General Appearance:  In no acute distress.    Vital signs: (most recent): Blood pressure 140/78, pulse 94, temperature 97.6 °F (36.4 °C), temperature source Oral, resp. rate 18, height 172.7 cm (68\"), weight 70.3 kg (155 lb), SpO2 95 %.  Vital signs are normal.  No fever.    Output: Producing urine.    HEENT: Normal HEENT exam.    Lungs:  Normal effort and normal respiratory rate.  Breath sounds clear to auscultation.    Heart: Normal rate.  S1 normal and S2 normal.    Chest: Symmetric chest wall expansion.   Abdomen: Abdomen is soft and non-distended.  Bowel sounds are normal.   There is no abdominal tenderness.     Extremities: Normal range of motion.  There is no deformity or dependent edema.    Pulses: Distal pulses are intact.  There are no decreased pulses.    Pupils:  Pupils are equal, round, and reactive to light.    Skin:  Warm, dry and pale.  No ecchymosis.             Results Review:    Lab Results (last 24 hours)     Procedure Component Value Units Date/Time    POC Glucose Once [[id number]]  (Normal) Collected:  03/08/19 1046    Specimen:  Blood Updated:  03/08/19 1108     Glucose 119 mg/dL      Comment: RN NotifiedOperator: [id number] LUANN Blackwood ID: [id number]       POC Glucose Once [[id number]]  (Abnormal) Collected:  03/08/19 0757    Specimen:  Blood Updated:  03/08/19 0836     Glucose 131 mg/dL      Comment: RN NotifiedOperator: [id number] " CURTIS Badillo ID: [id number]       Urine Culture - Urine, Urine, Clean Catch [[id number]]  (Abnormal) Collected:  03/06/19 2125    Specimen:  Urine, Clean Catch Updated:  03/08/19 0656     Urine Culture >100,000 CFU/mL Candida albicans    Protime-INR [[id number]]  (Abnormal) Collected:  03/08/19 0552    Specimen:  Blood Updated:  03/08/19 0626     Protime 20.2 Seconds      INR 1.80    Narrative:       Therapeutic range for most indications is 2.0-3.0 INR,  or 2.5-3.5 for mechanical heart valves.    Comprehensive Metabolic Panel [[id number]]  (Abnormal) Collected:  03/08/19 0552    Specimen:  Blood Updated:  03/08/19 0626     Glucose 113 mg/dL      BUN 7 mg/dL      Creatinine 1.18 mg/dL      Sodium 136 mmol/L      Potassium 3.7 mmol/L      Chloride 103 mmol/L      CO2 27.0 mmol/L      Calcium 7.7 mg/dL      Total Protein 5.6 g/dL      Albumin 2.70 g/dL      ALT (SGPT) 10 U/L      AST (SGOT) 22 U/L      Alkaline Phosphatase 67 U/L      Total Bilirubin 1.0 mg/dL      eGFR Non African Amer 59 mL/min/1.73      Globulin 2.9 gm/dL      A/G Ratio 0.9 g/dL      BUN/Creatinine Ratio 5.9     Anion Gap 6.0 mmol/L     Narrative:       The MDRD GFR formula is only valid for adults with stable renal function between ages 18 and 70.    CBC & Differential [[id number]] Collected:  03/08/19 0552    Specimen:  Blood Updated:  03/08/19 0610    Narrative:       The following orders were created for panel order CBC & Differential.  Procedure                               Abnormality         Status                     ---------                               -----------         ------                     CBC Auto Differential[[id number]]        Abnormal            Final result                 Please view results for these tests on the individual orders.    CBC Auto Differential [[id number]]  (Abnormal) Collected:  03/08/19 0552    Specimen:  Blood Updated:  03/08/19 0610     WBC 9.19 10*3/mm3      RBC 3.96 10*6/mm3      Hemoglobin 12.9 g/dL       Hematocrit 36.6 %      MCV 92.4 fL      MCH 32.6 pg      MCHC 35.2 g/dL      RDW 14.2 %      RDW-SD 48.0 fl      MPV 9.4 fL      Platelets 200 10*3/mm3      Neutrophil % 73.3 %      Lymphocyte % 16.1 %      Monocyte % 7.5 %      Eosinophil % 1.8 %      Basophil % 1.0 %      Immature Grans % 0.3 %      Neutrophils, Absolute 6.73 10*3/mm3      Lymphocytes, Absolute 1.48 10*3/mm3      Monocytes, Absolute 0.69 10*3/mm3      Eosinophils, Absolute 0.17 10*3/mm3      Basophils, Absolute 0.09 10*3/mm3      Immature Grans, Absolute 0.03 10*3/mm3      nRBC 0.0 /100 WBC     Blood Culture - Blood, Arm, Right [[id number]] Collected:  03/06/19 2019    Specimen:  Blood from Arm, Right Updated:  03/07/19 2100     Blood Culture No growth at 24 hours    Blood Culture - Blood, Hand, Right [[id number]] Collected:  03/06/19 2027    Specimen:  Blood from Hand, Right Updated:  03/07/19 2100     Blood Culture No growth at 24 hours    Potassium [[id number]]  (Abnormal) Collected:  03/07/19 1927    Specimen:  Blood Updated:  03/07/19 2028     Potassium 2.9 mmol/L     POC Glucose Once [[id number]]  (Abnormal) Collected:  03/07/19 2002    Specimen:  Blood Updated:  03/07/19 2014     Glucose 269 mg/dL      Comment: RN NotifiedOperator: [id number] [facility] [id number]       POC Glucose Once [[id number]]  (Abnormal) Collected:  03/07/19 1656    Specimen:  Blood Updated:  03/07/19 1726     Glucose 179 mg/dL      Comment: RN NotifiedOperator: [id number] MOREIRAALEX BOYERDIANABanner MD Anderson Cancer CenterMeter ID: [id number]            Imaging Results (last 24 hours)     ** No results found for the last 24 hours. **           I reviewed the patient's new clinical results.  I reviewed the patient's new imaging results and agree with the interpretation.  I reviewed the patient's other test results and agree with the interpretation      Assessment/Plan       Acute UTI (urinary tract infection)    Phimosis    Sepsis secondary to UTI (CMS/HCC)    Encephalopathy, metabolic     Hyponatremia    Type 2 diabetes mellitus (CMS/HCC)    History of DVT (deep vein thrombosis)    Essential hypertension    Hypokalemia      Assessment & Plan    1. UTI cystitis with hematuria  2. 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 Hx of circumcision on 2/27/19     -WBC 9.37-->9.19  -urine culture candida albicans  -Zosyn and vancomycin  Estimated Creatinine Clearance: 46.3 mL/min (by C-G formula based on SCr of 1.18 mg/dL).  -Unremarkable scrotal//testicular ultrasound yesterday     Plan:  Flomax  Remove Almeida for voiding trial, monitor for retention of urine.   Mycolog cream and Silvadene cream  Stop IV antibiotics, add cefdinir. Diflucan. Continue Mycolog cream and Silvadene.    KRISTINA Cuello  03/08/19  4:11 PM

## 2019-03-08 NOTE — PROGRESS NOTES
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  UA is consistent with UTI.  He was admitted on broad spectrum antibiotics and his confusion has improved.  Ultrasounds were unremarkable.  Urology consulted and recommended antibiotics, mycolog and silvadene creams, and probable voiding trial.  Cultures grew candida and he has been de-escalated to diflucan.  He is chronically on celexa for depression.  EKG revealed prolonged QT with QTc of 484.  Psychiatry is consulted.  He is sleepy this morning but arouses easily.     Review of Systems   Constitutional: Positive for fatigue. Negative for chills and fever.   Respiratory: Negative for shortness of breath.    Cardiovascular: Negative for chest pain.   Gastrointestinal: Negative for abdominal pain, diarrhea, nausea and vomiting.   Genitourinary: Positive for difficulty urinating, penile pain, penile swelling and scrotal swelling.        All pertinent negatives and positives are as above. All other systems have been reviewed and are negative unless otherwise stated.     Objective    Temp:  [97.6 °F (36.4 °C)-99.2 °F (37.3 °C)] 97.6 °F (36.4 °C)  Heart Rate:  [72-96] 94  Resp:  [16-18] 18  BP: (107-140)/(57-82) 140/78    Physical Exam   Constitutional: He is oriented to person, place, and time. He appears well-developed and well-nourished. No distress.   HENT:   Head: Normocephalic.   Eyes: Conjunctivae are normal.   Cardiovascular: Normal rate, regular rhythm, normal heart sounds and intact distal pulses.   Pulmonary/Chest: Effort normal and breath sounds  normal. No respiratory distress.   Abdominal: Soft. Bowel sounds are normal. He exhibits no distension. There is no tenderness.   Musculoskeletal: Normal range of motion. He exhibits no edema.   Neurological: He is alert and oriented to person, place, and time.   Skin: Skin is warm and dry. He is not diaphoretic. No erythema.   Vitals reviewed.          Results Review:  I have reviewed the labs, radiology results, and diagnostic studies.    Laboratory Data:   Results from last 7 days   Lab Units 03/08/19 0552 03/07/19 1927 03/07/19 0559 03/06/19 2003   SODIUM mmol/L 136*  --  134* 130*   POTASSIUM mmol/L 3.7 2.9* 2.6* 3.0*   CHLORIDE mmol/L 103  --  101 92*   CO2 mmol/L 27.0  --  30.0 32.0*   BUN mg/dL 7  --  9 10   CREATININE mg/dL 1.18  --  0.70 0.81   GLUCOSE mg/dL 113*  --  154* 252*   CALCIUM mg/dL 7.7*  --  7.6* 8.4   BILIRUBIN mg/dL 1.0  --   --  1.0   ALK PHOS U/L 67  --   --  97   ALT (SGPT) U/L 10*  --   --  13*   AST (SGOT) U/L 22  --   --  20   ANION GAP mmol/L 6.0  --  3.0* 6.0     Estimated Creatinine Clearance: 46.3 mL/min (by C-G formula based on SCr of 1.18 mg/dL).  Results from last 7 days   Lab Units 03/07/19 0559   MAGNESIUM mg/dL 2.0         Results from last 7 days   Lab Units 03/08/19 0552 03/07/19 0559 03/06/19 2003   WBC 10*3/mm3 9.19 9.37 9.82   HEMOGLOBIN g/dL 12.9* 13.3 14.9   HEMATOCRIT % 36.6* 38.6 42.9   PLATELETS 10*3/mm3 200 198 223     Results from last 7 days   Lab Units 03/08/19 0552 03/07/19 0559 03/06/19 2003   INR  1.80* 1.51* 1.56*       Culture Data:   Blood Culture   Date Value Ref Range Status   03/06/2019 No growth at 24 hours  Preliminary   03/06/2019 No growth at 24 hours  Preliminary     Urine Culture   Date Value Ref Range Status   03/06/2019 >100,000 CFU/mL Candida albicans (A)  Final     No results found for: RESPCX  No results found for: WOUNDCX  No results found for: STOOLCX  No components found for: BODYFLD    Radiology Data:   Imaging Results (last 24  hours)     ** No results found for the last 24 hours. **          I have reviewed the patient's current medications.     Assessment/Plan     Active Hospital Problems    Diagnosis   • **Acute UTI (urinary tract infection)   • Encephalopathy, metabolic   • Hyponatremia   • Type 2 diabetes mellitus (CMS/HCC)   • History of DVT (deep vein thrombosis)   • Essential hypertension   • Hypokalemia   • Sepsis secondary to UTI (CMS/HCC)   • Phimosis     Added automatically from request for surgery [id number]         Plan:    Antimicrobials de-esclated to omnicef and diflucan based on presentation and culture after discussed with urology  Urology consultation appreciated  Continue Almeida  Hold Celexa  Consult psychiatry for depression with prolonged QT on EKG and need for further potentially QT prolonging medications  Cardiac monitoring  Replace potassium by protocol  Pharmacy to dose coumadin  PT/OT        This document has been electronically signed by KRISTINA Stack on March 8, 2019 4:01 PM

## 2019-03-08 NOTE — PROGRESS NOTES
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    Goal INR: 2-3  Home dose is 1 mg    Today's INR:   Lab Results   Component Value Date    INR 1.80 (H) 03/08/2019         Lab Results   Component Value Date    INR 1.80 (H) 03/08/2019    INR 1.51 (H) 03/07/2019    INR 1.56 (H) 03/06/2019    PROTIME 20.2 (H) 03/08/2019    PROTIME 17.7 (H) 03/07/2019    PROTIME 18.2 (H) 03/06/2019     Lab Results   Component Value Date    HGB 12.9 (L) 03/08/2019    HGB 13.3 03/07/2019    HGB 14.9 03/06/2019     Lab Results   Component Value Date    HCT 36.6 (L) 03/08/2019    HCT 38.6 03/07/2019    HCT 42.9 03/06/2019     Lab Results   Component Value Date     03/08/2019     03/07/2019     03/06/2019       Recent Warfarin Administrations       The 5 most recent administrations since 03/01/2019 are shown below each listed medication.    Warfarin Sodium         Order Dose Date Given     warfarin (COUMADIN) half tablet 1.5 mg 1.5 mg 03/07/2019     warfarin (COUMADIN) tablet 1 mg 1 mg 03/07/2019                      Assessment/Plan:  Reviewed above labs. INR is 1.8.  INR is increasing towards goal. No changes in medication list. Concurrent medications include aspirin. Pt did receive dose of warfarin last night.  Will continue current dose of  1.5 mg. Rx will continue to follow and adjust dose accordingly.      Heather Quesada, [facility]  03/08/19 2:14 PM     "

## 2019-03-08 NOTE — PLAN OF CARE
Problem: Patient Care Overview  Goal: Plan of Care Review  Outcome: Ongoing (interventions implemented as appropriate)   03/08/19 0329   Coping/Psychosocial   Plan of Care Reviewed With patient   Plan of Care Review   Progress declining   OTHER   Outcome Summary Patient agitated at the beginning of the shift, trying to climb out of the bed. Family called to come to bedside, did not come. Reorientation was not effective. Hosp on call order haldol. Urine is dark red with clots present. No c/o pain or s/s of distress at this time. Will continue to monitor this patient.      Goal: Discharge Needs Assessment  Outcome: Ongoing (interventions implemented as appropriate)      Problem: Skin Injury Risk (Adult)  Goal: Skin Health and Integrity  Outcome: Ongoing (interventions implemented as appropriate)      Problem: Urinary Tract Infection (Adult)  Goal: Signs and Symptoms of Listed Potential Problems Will be Absent, Minimized or Managed (Urinary Tract Infection)  Outcome: Ongoing (interventions implemented as appropriate)      Problem: Pain, Acute (Adult)  Goal: Acceptable Pain Control/Comfort Level  Outcome: Ongoing (interventions implemented as appropriate)      Problem: Fluid Volume Deficit (Adult)  Goal: Identify Related Risk Factors and Signs and Symptoms  Outcome: Ongoing (interventions implemented as appropriate)    Goal: Optimal Fluid Balance  Outcome: Ongoing (interventions implemented as appropriate)

## 2019-03-09 LAB
ALBUMIN SERPL-MCNC: 3 G/DL (ref 3.4–4.8)
ALBUMIN/GLOB SERPL: 1.1 G/DL (ref 1.1–1.8)
ALP SERPL-CCNC: 72 U/L (ref 38–126)
ALT SERPL W P-5'-P-CCNC: 10 U/L (ref 21–72)
ANION GAP SERPL CALCULATED.3IONS-SCNC: 5 MMOL/L (ref 5–15)
AST SERPL-CCNC: 22 U/L (ref 17–59)
BASOPHILS # BLD AUTO: 0.08 10*3/MM3 (ref 0–0.2)
BASOPHILS NFR BLD AUTO: 0.9 % (ref 0–1.5)
BILIRUB SERPL-MCNC: 0.8 MG/DL (ref 0.2–1.3)
BUN BLD-MCNC: 6 MG/DL (ref 7–21)
BUN/CREAT SERPL: 5.9 (ref 7–25)
CALCIUM SPEC-SCNC: 8.2 MG/DL (ref 8.4–10.2)
CHLORIDE SERPL-SCNC: 105 MMOL/L (ref 95–110)
CO2 SERPL-SCNC: 24 MMOL/L (ref 22–31)
CREAT BLD-MCNC: 1.02 MG/DL (ref 0.7–1.3)
DEPRECATED RDW RBC AUTO: 48.9 FL (ref 37–54)
EOSINOPHIL # BLD AUTO: 0.29 10*3/MM3 (ref 0–0.4)
EOSINOPHIL NFR BLD AUTO: 3.2 % (ref 0.3–6.2)
ERYTHROCYTE [DISTWIDTH] IN BLOOD BY AUTOMATED COUNT: 14.5 % (ref 12.3–15.4)
GFR SERPL CREATININE-BSD FRML MDRD: 70 ML/MIN/1.73 (ref 42–98)
GLOBULIN UR ELPH-MCNC: 2.8 GM/DL (ref 2.3–3.5)
GLUCOSE BLD-MCNC: 101 MG/DL (ref 60–100)
GLUCOSE BLDC GLUCOMTR-MCNC: 116 MG/DL (ref 70–130)
GLUCOSE BLDC GLUCOMTR-MCNC: 157 MG/DL (ref 70–130)
GLUCOSE BLDC GLUCOMTR-MCNC: 173 MG/DL (ref 70–130)
HCT VFR BLD AUTO: 38.9 % (ref 37.5–51)
HGB BLD-MCNC: 13.6 G/DL (ref 13–17.7)
IMM GRANULOCYTES # BLD AUTO: 0.03 10*3/MM3 (ref 0–0.05)
IMM GRANULOCYTES NFR BLD AUTO: 0.3 % (ref 0–0.5)
INR PPP: 2.29 (ref 0.8–1.2)
LYMPHOCYTES # BLD AUTO: 1.48 10*3/MM3 (ref 0.7–3.1)
LYMPHOCYTES NFR BLD AUTO: 16.4 % (ref 19.6–45.3)
MCH RBC QN AUTO: 32.4 PG (ref 26.6–33)
MCHC RBC AUTO-ENTMCNC: 35 G/DL (ref 31.5–35.7)
MCV RBC AUTO: 92.6 FL (ref 79–97)
MONOCYTES # BLD AUTO: 0.62 10*3/MM3 (ref 0.1–0.9)
MONOCYTES NFR BLD AUTO: 6.9 % (ref 5–12)
NEUTROPHILS # BLD AUTO: 6.55 10*3/MM3 (ref 1.4–7)
NEUTROPHILS NFR BLD AUTO: 72.3 % (ref 42.7–76)
NRBC BLD AUTO-RTO: 0 /100 WBC (ref 0–0)
PLATELET # BLD AUTO: 202 10*3/MM3 (ref 140–450)
PMV BLD AUTO: 9.7 FL (ref 6–12)
POTASSIUM BLD-SCNC: 3.4 MMOL/L (ref 3.5–5.1)
POTASSIUM BLD-SCNC: 3.9 MMOL/L (ref 3.5–5.1)
PROCALCITONIN SERPL-MCNC: 0.11 NG/ML (ref 0–0.08)
PROT SERPL-MCNC: 5.8 G/DL (ref 6.3–8.6)
PROTHROMBIN TIME: 24.2 SECONDS (ref 11.1–15.3)
RBC # BLD AUTO: 4.2 10*6/MM3 (ref 4.14–5.8)
SODIUM BLD-SCNC: 134 MMOL/L (ref 137–145)
WBC NRBC COR # BLD: 9.05 10*3/MM3 (ref 3.4–10.8)

## 2019-03-09 PROCEDURE — 99232 SBSQ HOSP IP/OBS MODERATE 35: CPT | Performed by: PSYCHIATRY & NEUROLOGY

## 2019-03-09 PROCEDURE — 97110 THERAPEUTIC EXERCISES: CPT

## 2019-03-09 PROCEDURE — 85610 PROTHROMBIN TIME: CPT | Performed by: INTERNAL MEDICINE

## 2019-03-09 PROCEDURE — 63710000001 INSULIN ASPART PER 5 UNITS: Performed by: INTERNAL MEDICINE

## 2019-03-09 PROCEDURE — 85025 COMPLETE CBC W/AUTO DIFF WBC: CPT | Performed by: NURSE PRACTITIONER

## 2019-03-09 PROCEDURE — 82962 GLUCOSE BLOOD TEST: CPT

## 2019-03-09 PROCEDURE — 36415 COLL VENOUS BLD VENIPUNCTURE: CPT | Performed by: INTERNAL MEDICINE

## 2019-03-09 PROCEDURE — 80053 COMPREHEN METABOLIC PANEL: CPT | Performed by: NURSE PRACTITIONER

## 2019-03-09 PROCEDURE — 93010 ELECTROCARDIOGRAM REPORT: CPT | Performed by: INTERNAL MEDICINE

## 2019-03-09 PROCEDURE — 97530 THERAPEUTIC ACTIVITIES: CPT

## 2019-03-09 PROCEDURE — 84132 ASSAY OF SERUM POTASSIUM: CPT | Performed by: NURSE PRACTITIONER

## 2019-03-09 PROCEDURE — 93005 ELECTROCARDIOGRAM TRACING: CPT | Performed by: NURSE PRACTITIONER

## 2019-03-09 PROCEDURE — 94760 N-INVAS EAR/PLS OXIMETRY 1: CPT

## 2019-03-09 RX ADMIN — NYSTATIN: 100000 POWDER TOPICAL at 21:11

## 2019-03-09 RX ADMIN — ASPIRIN 81 MG: 81 TABLET, COATED ORAL at 09:01

## 2019-03-09 RX ADMIN — FLUCONAZOLE 200 MG: 200 TABLET ORAL at 09:01

## 2019-03-09 RX ADMIN — POTASSIUM CHLORIDE 40 MEQ: 1.5 POWDER, FOR SOLUTION ORAL at 10:16

## 2019-03-09 RX ADMIN — SODIUM CHLORIDE, PRESERVATIVE FREE 3 ML: 5 INJECTION INTRAVENOUS at 09:03

## 2019-03-09 RX ADMIN — SODIUM CHLORIDE, PRESERVATIVE FREE 3 ML: 5 INJECTION INTRAVENOUS at 21:11

## 2019-03-09 RX ADMIN — FAMOTIDINE 40 MG: 40 TABLET ORAL at 09:01

## 2019-03-09 RX ADMIN — INSULIN ASPART 2 UNITS: 100 INJECTION, SOLUTION INTRAVENOUS; SUBCUTANEOUS at 11:21

## 2019-03-09 RX ADMIN — Medication 1.5 MG: at 17:10

## 2019-03-09 RX ADMIN — SODIUM CHLORIDE 100 ML/HR: 900 INJECTION, SOLUTION INTRAVENOUS at 04:08

## 2019-03-09 RX ADMIN — NYSTATIN AND TRIAMCINOLONE ACETONIDE: 100000; 1 CREAM TOPICAL at 21:11

## 2019-03-09 RX ADMIN — TAMSULOSIN HYDROCHLORIDE 0.4 MG: 0.4 CAPSULE ORAL at 22:21

## 2019-03-09 RX ADMIN — SILVER SULFADIAZINE: 10 CREAM TOPICAL at 09:03

## 2019-03-09 RX ADMIN — FLUOXETINE HYDROCHLORIDE 10 MG: 10 CAPSULE ORAL at 09:01

## 2019-03-09 RX ADMIN — CEFDINIR 300 MG: 300 CAPSULE ORAL at 09:01

## 2019-03-09 RX ADMIN — CEFDINIR 300 MG: 300 CAPSULE ORAL at 21:11

## 2019-03-09 RX ADMIN — INSULIN ASPART 2 UNITS: 100 INJECTION, SOLUTION INTRAVENOUS; SUBCUTANEOUS at 17:11

## 2019-03-09 RX ADMIN — NYSTATIN: 100000 POWDER TOPICAL at 09:02

## 2019-03-09 RX ADMIN — DOCUSATE SODIUM 100 MG: 100 CAPSULE, LIQUID FILLED ORAL at 09:01

## 2019-03-09 RX ADMIN — NYSTATIN AND TRIAMCINOLONE ACETONIDE: 100000; 1 CREAM TOPICAL at 09:03

## 2019-03-09 RX ADMIN — POTASSIUM CHLORIDE 40 MEQ: 1.5 POWDER, FOR SOLUTION ORAL at 14:13

## 2019-03-09 RX ADMIN — ATORVASTATIN CALCIUM 10 MG: 10 TABLET, FILM COATED ORAL at 09:01

## 2019-03-09 NOTE — PROGRESS NOTES
"Psychiatry & Behavioral Health Inpatient Consult Progress Note                                      3/9/2019    HD: #2    Referring Provider: KRISTINA Monsalve    Reason for Consultation & CC: depression and QTc prolongation    Subjective --  Mr. Arnoldo Guzman is a 84 y.o. male who was seen on the 4W unit.      Pleasantly confused.  Eating lunch.  Denies any depression or SI.  No CP/SOA/CT.    Per daughters, no crying spells or affective concerns.  Ongoing confusion, waxing and waning.          Objective   Objective --      Vital Signs:  Temp:  [96.8 °F (36 °C)-98.5 °F (36.9 °C)] 98.3 °F (36.8 °C)  Heart Rate:  [68-98] 89  Resp:  [18] 18  BP: (136-159)/(68-79) 139/79    --> EKG: reviewed from today, QTc 498 ms; compared to prior still prolonged QT, some PVCs continued    Physical Exam:   --General Appearance:  alert, hard of hearing, interactive and cooperative  --Hygiene:  fair   --Gait & Station:  Blank multiple: Deferred, in bed  --Musculoskeletal:  No tremors or abnormal involuntary movements and No atrophy noted  --Pulm: unlaboured      Mental Status Exam:   --Cooperation:  Cooperative  --Eye Contact:  Fair  --Psychomotor Behavior:  More appropriate today  --Mood:  \"Good\"  --Affect:  pleasantly confused; no overt dysphoria  --Speech:  Slow, Soft and Word finding problems  --Thought Process:  Sluggish and Disorganized  --Associations: Disorganized  --Themes:  None overt  --Thought Content:                --Mood congurent              --Suicidal:  Denies               --Homicidal:  Denies              --Hallucinations:  Denies and Not appearing to respond to internal stimuli              --Delusion:  None noted/overt and No overt psychotic overlay  --Cognitive Functioning:              -Consciousness: awake and alert              -Orientation:  Person              -Attention:  Distractible                         -Fund of Knowledge:  Below Average based on interaction  --Reliability:  impaired  --Insight:  " Limited  --Judgment:  Impaired  --Impulse Control:  Impaired       Diagnostic Data --  Lab Results (last 24 hours)     Procedure Component Value Units Date/Time    POC Glucose Once [[id number]]  (Abnormal) Collected:  03/09/19 1049    Specimen:  Blood Updated:  03/09/19 1122     Glucose 157 mg/dL      Comment: RN NotifiedOperator: [id number] CURTIS Badillo ID: [id number]       Protime-INR [[id number]]  (Abnormal) Collected:  03/09/19 0650    Specimen:  Blood Updated:  03/09/19 0837     Protime 24.2 Seconds      INR 2.29    Narrative:       Therapeutic range for most indications is 2.0-3.0 INR,  or 2.5-3.5 for mechanical heart valves.    Comprehensive Metabolic Panel [[id number]]  (Abnormal) Collected:  03/09/19 0650    Specimen:  Blood Updated:  03/09/19 0802     Glucose 101 mg/dL      BUN 6 mg/dL      Creatinine 1.02 mg/dL      Sodium 134 mmol/L      Potassium 3.4 mmol/L      Chloride 105 mmol/L      CO2 24.0 mmol/L      Calcium 8.2 mg/dL      Total Protein 5.8 g/dL      Albumin 3.00 g/dL      ALT (SGPT) 10 U/L      AST (SGOT) 22 U/L      Alkaline Phosphatase 72 U/L      Total Bilirubin 0.8 mg/dL      eGFR Non African Amer 70 mL/min/1.73      Globulin 2.8 gm/dL      A/G Ratio 1.1 g/dL      BUN/Creatinine Ratio 5.9     Anion Gap 5.0 mmol/L     Narrative:       The MDRD GFR formula is only valid for adults with stable renal function between ages 18 and 70.    CBC & Differential [[id number]] Collected:  03/09/19 0650    Specimen:  Blood Updated:  03/09/19 0753    Narrative:       The following orders were created for panel order CBC & Differential.  Procedure                               Abnormality         Status                     ---------                               -----------         ------                     CBC Auto Differential[[id number]]        Abnormal            Final result                 Please view results for these tests on the individual orders.    CBC Auto Differential [[id number]]   (Abnormal) Collected:  03/09/19 0650    Specimen:  Blood Updated:  03/09/19 0753     WBC 9.05 10*3/mm3      RBC 4.20 10*6/mm3      Hemoglobin 13.6 g/dL      Hematocrit 38.9 %      MCV 92.6 fL      MCH 32.4 pg      MCHC 35.0 g/dL      RDW 14.5 %      RDW-SD 48.9 fl      MPV 9.7 fL      Platelets 202 10*3/mm3      Neutrophil % 72.3 %      Lymphocyte % 16.4 %      Monocyte % 6.9 %      Eosinophil % 3.2 %      Basophil % 0.9 %      Immature Grans % 0.3 %      Neutrophils, Absolute 6.55 10*3/mm3      Lymphocytes, Absolute 1.48 10*3/mm3      Monocytes, Absolute 0.62 10*3/mm3      Eosinophils, Absolute 0.29 10*3/mm3      Basophils, Absolute 0.08 10*3/mm3      Immature Grans, Absolute 0.03 10*3/mm3      nRBC 0.0 /100 WBC     Blood Culture - Blood, Arm, Right [[id number]] Collected:  03/06/19 2019    Specimen:  Blood from Arm, Right Updated:  03/08/19 2100     Blood Culture No growth at 2 days    Blood Culture - Blood, Hand, Right [[id number]] Collected:  03/06/19 2027    Specimen:  Blood from Hand, Right Updated:  03/08/19 2100     Blood Culture No growth at 2 days    POC Glucose Once [[id number]]  (Normal) Collected:  03/08/19 1925    Specimen:  Blood Updated:  03/08/19 1953     Glucose 130 mg/dL      Comment: RN NotifiedOperator: [id number] MARY GABRIELLEbeverly ID: [id number]       POC Glucose Once [[id number]]  (Abnormal) Collected:  03/08/19 1657    Specimen:  Blood Updated:  03/08/19 1715     Glucose 148 mg/dL      Comment: RN NotifiedOperator: [id number] LUANN Blackwood ID: [id number]             Imaging Results (last 24 hours)     ** No results found for the last 24 hours. **            Medications:   Scheduled Meds:  aspirin 81 mg Oral Daily   atorvastatin 10 mg Oral Daily   cefdinir 300 mg Oral Q12H   docusate sodium 100 mg Oral Daily   famotidine 40 mg Oral Daily   fluconazole 200 mg Oral Daily   insulin aspart 0-9 Units Subcutaneous 4x Daily AC & at Bedtime   ipratropium-albuterol 3 mL Nebulization Q6H - RT    nystatin  Topical Q12H   nystatin-triamcinolone  Topical Q12H   silver sulfadiazine  Topical Q24H   sodium chloride 3 mL Intravenous Q12H   sodium chloride      tamsulosin 0.4 mg Oral Nightly   warfarin 1.5 mg Oral Daily     Continuous Infusions:  Pharmacy to dose warfarin     sodium chloride 100 mL/hr Last Rate: 100 mL/hr (03/09/19 0408)     PRN Meds:.dextrose  •  dextrose  •  glucagon (human recombinant)  •  magnesium sulfate **OR** magnesium sulfate **OR** magnesium sulfate  •  Morphine **AND** naloxone  •  ondansetron  •  Pharmacy to dose warfarin  •  potassium chloride **OR** potassium chloride **OR** potassium chloride  •  sodium chloride      Assessment:     Acute UTI (urinary tract infection)    Phimosis    Sepsis secondary to UTI (CMS/HCC)    Encephalopathy, metabolic    Hyponatremia    Type 2 diabetes mellitus (CMS/HCC)    History of DVT (deep vein thrombosis)    Essential hypertension    Hypokalemia     --Probable acute delirum  --Major Depression, controlled on SSRI  --Concern for PBA s/p CVA      DIAGNOSTIC IMPRESSION: No significant affective issues.  Tolerated two doses of Prozac w/out issue.        Treatment Plan & Recommendations:  --S/p Prozac 10mg x 2 days to aid w/ 5HT withdrawal.  Minimal QTc prolongation  --Continue to monitor for any worsening affect.    --Once antibiotics have been discontinued and QTC is normalized, can reassess need for Celexa or another SSRI that may have less of an impact on QTC.  This can be done either as an inpatient or follow-up with outpatient behavioral health.    All questions answered for the patient and his daughters.     Impression and recommendations discussed with nursing staff.     Psychiatry will continue to follow, as needed.         Shilo Ortega II, MD  Psychiatry & Behavioral Sciences  03/09/19 @ 1:04 PM  Dictated using Dragon.

## 2019-03-09 NOTE — PLAN OF CARE
Problem: Patient Care Overview  Goal: Plan of Care Review  Outcome: Ongoing (interventions implemented as appropriate)   03/09/19 0141 03/09/19 0907   Coping/Psychosocial   Plan of Care Reviewed With patient --    Plan of Care Review   Progress no change --    OTHER   Outcome Summary --  pt sup>sit with min-mod assist of 1, sit<>stand with mod assist of 1, pt transfered bed>chair with stand pivot of mod assist of 1. pt will require 24/7 care & continued PT services @ d/c

## 2019-03-09 NOTE — THERAPY TREATMENT NOTE
Acute Care - Physical Therapy Treatment Note  [facility]     Patient Name: Arnoldo Guzman  : 1934  MRN: [id number]  Today's Date: 3/9/2019  Onset of Illness/Injury or Date of Surgery: 19  Date of Referral to PT: 19  Referring Physician: Rey ACEVEDO    Admit Date: 3/6/2019    Visit Dx:    ICD-10-CM ICD-9-CM   1. Sepsis secondary to UTI (CMS/HCC) A41.9 038.9    N39.0 995.91     599.0   2. Impaired physical mobility Z74.09 781.99     Patient Active Problem List   Diagnosis   • Phimosis   • Sepsis secondary to UTI (CMS/HCC)   • Acute UTI (urinary tract infection)   • Encephalopathy, metabolic   • Hyponatremia   • Type 2 diabetes mellitus (CMS/[facility])   • History of DVT (deep vein thrombosis)   • Essential hypertension   • Hypokalemia       Therapy Treatment    Rehabilitation Treatment Summary     Row Name 19 0958             Treatment Time/Intention    Discipline  physical therapy assistant  -TA      Document Type  therapy note (daily note)  -TA      Subjective Information  no complaints  -TA      Mode of Treatment  individual therapy  -TA      Therapy Frequency (PT Clinical Impression)  other (see comments) 5-14  -TA      Patient Effort  adequate  -TA      Existing Precautions/Restrictions  fall  -TA      Recorded by [TA] Penelope Henderson PTA 19 9687      Row Name 19 0958             Vital Signs    Pretreatment Heart Rate (beats/min)  98  -TA      Intratreatment Heart Rate (beats/min)  100  -TA      Posttreatment Heart Rate (beats/min)  95  -TA      Pre SpO2 (%)  96  -TA      O2 Delivery Pre Treatment  room air  -TA      Intra SpO2 (%)  94  -TA      O2 Delivery Intra Treatment  room air  -TA      Post SpO2 (%)  96  -TA      O2 Delivery Post Treatment  room air  -TA      Pre Patient Position  Supine  -TA      Intra Patient Position  Sitting  -TA      Post Patient Position  Sitting  -TA      Recorded by [TA] Penelope Henderson PTA 19 1314      Row Name 19 0958              Cognitive Assessment/Intervention- PT/OT    Affect/Mental Status (Cognitive)  confused;flat/blunted affect  -TA      Orientation Status (Cognition)  oriented to;person;disoriented to;place;time   -TA      Follows Commands (Cognition)  follows one step commands;75-90% accuracy;increased processing time needed;physical/tactile prompts required  -TA      Personal Safety Interventions  fall prevention program maintained;gait belt;nonskid shoes/slippers when out of bed;supervised activity  -TA      Recorded by [TA] Penelope Henderson, PTA 19 1315      Row Name 19             Safety Issues, Functional Mobility    Impairments Affecting Function (Mobility)  balance;cognition;endurance/activity tolerance;range of motion (ROM);strength  -TA      Recorded by [TA] Penelope Henderson, PTA 19 1315      Row Name 19             Bed Mobility Assessment/Treatment    Bed Mobility Assessment/Treatment  supine-sit;sit-supine  -TA      Supine-Sit [place] (Bed Mobility)  minimum assist (75% patient effort);moderate assist (50% patient effort);1 person assist  -TA      Sit-Supine [place] (Bed Mobility)  not tested  -TA      Bed Mobility, Safety Issues  decreased use of arms for pushing/pulling  -TA      Assistive Device (Bed Mobility)  bed rails;head of bed elevated  -TA      Recorded by [TA] Penelope Henderson, PTA 19 131      Row Name 19             Transfer Assessment/Treatment    Transfer Assessment/Treatment  bed-chair transfer;sit-stand transfer;stand-sit transfer  -TA      Comment (Transfers)  pt stood with trey walker with mod assist of 1 to allow CNA to perform pericare  -TA2      Recorded by [TA] Penelope Henderson, PTA 19 131  [TA2] Penelope Henderson, PTA 19 1318      Row Name 19             Bed-Chair Transfer    Bed-Chair [place] (Transfers)  moderate assist (50% patient effort);1 person assist  -TA      Assistive Device (Bed-Chair  Transfers)  -- none  -TA      Recorded by [TA] Penelope Henderson, PTA 03/09/19 1315      Row Name 03/09/19 0958             Sit-Stand Transfer    Sit-Stand [place] (Transfers)  1 person assist;moderate assist (50% patient effort)  -TA      Assistive Device (Sit-Stand Transfers)  -- none  -TA      Recorded by [TA] Penelope Henderson, PTA 03/09/19 1315      Row Name 03/09/19 0958             Stand-Sit Transfer    Stand-Sit [place] (Transfers)  minimum assist (75% patient effort)  -TA      Assistive Device (Stand-Sit Transfers)  -- none  -TA      Recorded by [TA] Penelope Henderson, [facility] 03/09/19 1315      Row Name 03/09/19 0958             Therapeutic Exercise    Lower Extremity (Therapeutic Exercise)  LAQ (long arc quad), bilateral;marching while seated  -TA      Lower Extremity Range of Motion (Therapeutic Exercise)  ankle dorsiflexion/plantar flexion, bilateral  -TA      Exercise Type (Therapeutic Exercise)  AAROM (active assistive range of motion);AROM (active range of motion)  -TA      Position (Therapeutic Exercise)  seated  -TA      Sets/Reps (Therapeutic Exercise)  20  -TA      Expected Outcome (Therapeutic Exercise)  facilitate normal movement patterns;improve functional stability;improve motor control;improve performance, transfer skills;increase active range of motion  -TA      Recorded by [TA] Penelope Henderson, [facility] 03/09/19 1315      Row Name 03/09/19 0958             Positioning and Restraints    Pre-Treatment Position  in bed  -TA      Post Treatment Position  chair  -TA      In Chair  reclined;call light within reach  -TA      Recorded by [TA] Penelope Henderson, [facility] 03/09/19 1315      Row Name 03/09/19 0958             Pain Scale: Numbers Pre/Post-Treatment    Pain Scale: Numbers, Pretreatment  0/10 - no pain  -TA      Pain Scale: Numbers, Post-Treatment  0/10 - no pain  -TA      Recorded by [TA] Penelope Henderson, [facility] 03/09/19 1315      Row Name 03/09/19 0958             Sensory Assessment/Intervention     Sensory General Assessment  no sensation deficits identified  -TA      Recorded by [TA] Penelope Henderson, PTA 03/09/19 1315      Row Name 03/09/19 0958             Vision Assessment/Intervention    Visual Impairment/Limitations  -- per patient - OK  -TA      Recorded by [TA] Penelope Henderson, PTA 03/09/19 1315      Row Name                Wound 02/27/19 1827 penis surgical;incision    Wound - Properties Group Date first assessed: 02/27/19 [CH] Time first assessed: 1827 [CH] Present On Admission : no [CH] Location: penis [CH] Type: surgical;incision [CH] Recorded by:  [CH] Kyara Quiros RN 02/27/19 1827    Row Name 03/09/19 0958             Outcome Summary/Treatment Plan (PT)    Daily Summary of Progress (PT)  progress towards functional goals is fair  -TA      Plan for Continued Treatment (PT)  continue  -TA      Anticipated Equipment Needs at Discharge (PT)  -- unknown until known what he already has  -TA      Anticipated Discharge Disposition (PT)  home with home health;skilled nursing facility  -TA      Recorded by [TA] Penelope Henderson PTA 03/09/19 1315        User Key  (r) = Recorded By, (t) = Taken By, (c) = Cosigned By    Initials Name Effective Dates Discipline    TA Penelope Henderson, CHELSI 03/07/18 -  PT    CH Kyara Quiros RN 06/23/17 -  Nurse          Wound 02/27/19 1827 penis surgical;incision (Active)   Dressing Appearance open to air 3/9/2019  7:45 AM   Closure None 3/9/2019  7:45 AM   Base red/granulating;scab 3/9/2019  7:45 AM   Care, Wound barrier applied;other (see comments) 3/8/2019  8:56 PM       Rehab Goal Summary     Row Name 03/09/19 0907             Physical Therapy Goals    Bed Mobility Goal Selection (PT)  bed mobility, PT goal 1  -TA      Transfer Goal Selection (PT)  transfer, PT goal 1  -TA      Gait Training Goal Selection (PT)  gait training, PT goal 1  -TA         Bed Mobility Goal 1 (PT)    Activity/Assistive Device (Bed Mobility Goal 1, PT)  sit to supine;supine to sit   -TA      [place] Level/Cues Needed (Bed Mobility Goal 1, PT)  supervision required  -TA      Time Frame (Bed Mobility Goal 1, PT)  3 days  -TA      Progress/Outcomes (Bed Mobility Goal 1, PT)  goal not met;goal ongoing  -TA         Transfer Goal 1 (PT)    Activity/Assistive Device (Transfer Goal 1, PT)  sit-to-stand/stand-to-sit;bed-to-chair/chair-to-bed;wheelchair transfer  -TA      [place] Level/Cues Needed (Transfer Goal 1, PT)  contact guard assist  -TA      Time Frame (Transfer Goal 1, PT)  2 - 3 days  -TA      Progress/Outcome (Transfer Goal 1, PT)  goal not met;goal ongoing  -TA         Gait Training Goal 1 (PT)    Activity/Assistive Device (Gait Training Goal 1, PT)  gait (walking locomotion);assistive device use;decrease fall risk;increase endurance/gait distance;walker, trey  -TA      [place] Level (Gait Training Goal 1, PT)  contact guard assist  -TA      Distance (Gait Goal 1, PT)  5  -TA      Time Frame (Gait Training Goal 1, PT)  2 - 3 days  -TA      Progress/Outcome (Gait Training Goal 1, PT)  goal not met;goal ongoing  -TA         Patient Education Goal (PT)    Activity (Patient Education Goal, PT)  HEP  -TA      [place]/Cues/Accuracy (Memory Goal 2, PT)  demonstrates adequately;verbalizes understanding  -TA      Time Frame (Patient Education Goal, PT)  3 days  -TA      Progress/Outcome (Patient Education Goal, PT)  goal not met;goal ongoing  -TA        User Key  (r) = Recorded By, (t) = Taken By, (c) = Cosigned By    Initials Name Provider Type Discipline    Penelope Blanco, PTA Physical Therapy Assistant PT          Physical Therapy Education     Title: PT OT SLP Therapies (In Progress)     Topic: Physical Therapy (In Progress)     Point: Mobility training (In Progress)     Learning Progress Summary           Patient Acceptance, E,D, NR by CB at 3/8/2019  1:03 PM                   Point: Precautions (In Progress)     Learning Progress Summary           Patient Acceptance,  CAILIN,D, NR by CAMILLE at 3/8/2019  1:03 PM                               User Key     Initials Effective Dates Name Provider Type Discipline    CB 04/06/17 -  Amanda Mccain, PT Physical Therapist PT                PT Recommendation and Plan  Anticipated Discharge Disposition (PT): home with home health, skilled nursing facility  Therapy Frequency (PT Clinical Impression): other (see comments)(5-14)  Outcome Summary/Treatment Plan (PT)  Daily Summary of Progress (PT): progress towards functional goals is fair  Plan for Continued Treatment (PT): continue  Anticipated Equipment Needs at Discharge (PT): (unknown until known what he already has)  Anticipated Discharge Disposition (PT): home with home health, skilled nursing facility  Outcome Summary: pt sup>sit with min-mod assist of 1, sit<>stand with mod assist of 1, pt transfered bed>chair with stand pivot of mod assist of 1. pt will require 24/7 care & continued PT services @ d/c  Outcome Measures     Row Name 03/09/19 1300 03/08/19 0944          How much help from another person do you currently need...    Turning from your back to your side while in flat bed without using bedrails?  2  -TA  2  -CB     Moving from lying on back to sitting on the side of a flat bed without bedrails?  2  -TA  2  -CB     Moving to and from a bed to a chair (including a wheelchair)?  2  -TA  2  -CB     Standing up from a chair using your arms (e.g., wheelchair, bedside chair)?  2  -TA  2  -CB     Climbing 3-5 steps with a railing?  1  -TA  1  -CB     To walk in hospital room?  2  -TA  2  -CB     AM-PAC 6 Clicks Score  11  -TA  11  -CB        Functional Assessment    Outcome Measure Options  AM-PAC 6 Clicks Basic Mobility (PT)  -TA  AM-PAC 6 Clicks Basic Mobility (PT)  -CB       User Key  (r) = Recorded By, (t) = Taken By, (c) = Cosigned By    Initials Name Provider Type    CB Amanda Mccain, PT Physical Therapist    Penelope Blanco, PTA Physical Therapy Assistant         Time  Calculation:   PT Charges     Row Name 03/09/19 1320             Time Calculation    Start Time  0958  -TA      Stop Time  1039  -TA      Time Calculation (min)  41 min  -TA         Time Calculation- PT    Total Timed Code Minutes- PT  41 minute(s)  -TA        User Key  (r) = Recorded By, (t) = Taken By, (c) = Cosigned By    Initials Name Provider Type    Penelope Blanco PTA Physical Therapy Assistant        Therapy Suggested Charges     Code   Minutes Charges    None           Therapy Charges for Today     Code Description Service Date Service Provider Modifiers Qty    50163483594 HC PT THERAPEUTIC ACT EA 15 MIN 3/9/2019 Penelope Henderson PTA GP 2    72407237019 HC PT THER PROC EA 15 MIN 3/9/2019 Penelope Henderson, CHELSI GP 1          PT G-Codes  Outcome Measure Options: AM-PAC 6 Clicks Basic Mobility (PT)  AM-PAC 6 Clicks Score: 11    Penelope Henderson PTA  3/9/2019

## 2019-03-09 NOTE — PROGRESS NOTES
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    Goal INR: 2-3  Home dose is 1 mg    Today's INR:   Lab Results   Component Value Date    INR 2.29 (H) 03/09/2019         Lab Results   Component Value Date    INR 2.29 (H) 03/09/2019    INR 1.80 (H) 03/08/2019    INR 1.51 (H) 03/07/2019    PROTIME 24.2 (H) 03/09/2019    PROTIME 20.2 (H) 03/08/2019    PROTIME 17.7 (H) 03/07/2019     Lab Results   Component Value Date    HGB 13.6 03/09/2019    HGB 12.9 (L) 03/08/2019    HGB 13.3 03/07/2019     Lab Results   Component Value Date    HCT 38.9 03/09/2019    HCT 36.6 (L) 03/08/2019    HCT 38.6 03/07/2019     Lab Results   Component Value Date     03/09/2019     03/08/2019     03/07/2019       Recent Warfarin Administrations       The 5 most recent administrations since 03/02/2019 are shown below each listed medication.    Warfarin Sodium         Order Dose Date Given     warfarin (COUMADIN) half tablet 1.5 mg 1.5 mg 03/08/2019      1.5 mg 03/07/2019     warfarin (COUMADIN) tablet 1 mg 1 mg 03/07/2019                      Assessment/Plan:  Reviewed above labs. INR is 2.29.  INR is at goal. No changes in medication list. Concurrent medications include aspirin. Pt did receive dose of warfarin last night.  Will continue current dose of  1.5 mg. Rx will continue to follow and adjust dose accordingly.     Heather Quesada [facility]  03/09/19 11:20 AM     "

## 2019-03-09 NOTE — CONSULTS
Psychiatry & Behavioral Health Inpatient Consult                                      3/8/2019      Referring Provider: KRISTINA Villalobos    Reason for Consultation: depression and QTc prolongation    Source of History: chart review, the patient and staff and two daughters      HPI: Mr. Arnoldo Guzman is a 84 y.o. male who has a history of depression, well treated on Celexa.  Depression had started after having a stroke.  Patient has been admitted with confusion and penile pain, with need for antifungal and concern for QTC prolongation at baseline.  Psychiatry is been consulted given this concern and his concurrent depression    Patient presents with depression. Onset of symptoms was gradual starting several years ago.  Symptoms have been present on an intermittent basis. Symptoms are associated with depressed mood.  Symptoms are aggravated by problems with health.   Symptoms improve with Celexa.  Patient's symptom severity is moderate.   Patient's symptoms occur in the context of chronic medical illness, including strokes.  No prior medicines other than Celexa had been used.  1 of the daughters is on Zoloft and has had a positive response.    Patient is also noted to have some component of pseudobulbar affect.  Has crying spells but can also have laughing spells.  This started after the stroke.  Celexa has well-controlled this process.    On exam, patient denies any depression.  He denies any SI or HI.  Denies any nihilism.  He appears pleasantly confused.  He can tell me his name and age, not location, nor time or situation.  Registration is 0 of 3 and recall 0 of 3.  Sustained concentration 0/5.      Psychiatric Review Of Systems:  --depression      Concurrent Psychiatric History:  -Past Neuro--Psychiatric History: CVA, depression, possible PBA  -Psychiatric Hospitalizations: denies  -Suicide Attempts: denies  -Prior Treatment and Medications Tried: Celexa only to good  effect  -History of violence or legal issues: denies  -Substance History: no etoh/yolanda/illicits/thc  -Abuse/Trauma/Neglect/Exploitation Hx: none known      Social History:  --> Primarily cared for by his 2 daughters; retired worked as a  and on the farm.  Social History     Socioeconomic History   • Marital status:      Spouse name: Not on file   • Number of children: Not on file   • Years of education: Not on file   • Highest education level: Not on file   Social Needs   • Financial resource strain: Not on file   • Food insecurity - worry: Not on file   • Food insecurity - inability: Not on file   • Transportation needs - medical: Not on file   • Transportation needs - non-medical: Not on file   Occupational History   • Not on file   Tobacco Use   • Smoking status: Never Smoker   • Smokeless tobacco: Current User     Types: Chew   • Tobacco comment: DOES NOT CURRENTLY SMOKE   Substance and Sexual Activity   • Alcohol use: No   • Drug use: No   • Sexual activity: Defer   Other Topics Concern   • Not on file   Social History Narrative   • Not on file           Family History:  Family History   Problem Relation Age of Onset   • Asthma Other    • Cancer Other    • Depression Other    • Diabetes Other    • Heart disease Other    • COPD Other    • Stroke Other    • Osteoarthritis Other    • Clotting disorder Other    • Seizures Other      --> Further details: Family Suicides: None known      Concurrent Non-Psychiatric Medical and Surgical History:  Past Medical History:   Diagnosis Date   • Anxiety    • Asthma    • Backache     Lumbarsacral radiculopathy      • Cataract    • Cerebrovascular accident (CMS/HCC)     R thalamic w L side residual hemiparesis      • Cerebrovascular disease    • Cervical spondylosis     C5-6,C6-C7 with traction spurs at L2and L3   • Chronic sinusitis    • Coal workers' pneumoconiosis (CMS/HCC)    • COPD (chronic obstructive pulmonary disease) (CMS/HCC)    • Deep venous thrombosis  of lower extremity (CMS/HCC)     RECURRENT   • Degeneration of lumbar intervertebral disc    • Depression    • Diabetes mellitus (CMS/HCC)     Hypoglycemic state in diabetes      • Dysphagia    • Dyspnea    • Edema of leg    • Hypertension    • Hypokalemia    • Infarction of lung due to iatrogenic pulmonary embolism (CMS/HCC)     BILATERAL   • Muscle weakness     RESIDUAL LEFT-SIDED   • Neck pain    • On anticoagulant therapy    • Pain in elbow    • Pseudophakia    • Psoriasis    • S/P insertion of IVC (inferior vena caval) filter 01/29/2010    Geoff pul emboli   • Urinary incontinence      --> Seizure Hx: None  --> Sleep Disordered Breathing: None known    Past Surgical History:   Procedure Laterality Date   • CIRCUMCISION     • ENDOSCOPY  01/19/2001    Marked strictured region with mucosa at the gastroesophageal junction. 530.3   • EYE SURGERY Right 03/24/1999    CATARACT REMOVAL W/LENS IMPLANT   • PHALLOPLASTY, CIRCUMCISION N/A 2/27/2019    Procedure: CIRCUMCISION;  Surgeon: Raza Zayas MD;  Location: [facility];  Service: Urology       Patient has no known allergies.    Medications Prior to Admission   Medication Sig Dispense Refill Last Dose   • aspirin 81 MG EC tablet Take 81 mg by mouth Daily.   3/5/2019 at 2000   • citalopram (CeleXA) 40 MG tablet Take 40 mg by mouth Daily.   3/5/2019 at Unknown time   • Hydrocodone-Acetaminophen (LORTAB 10)  MG per tablet Take 1 tablet by mouth Every 6 (Six) Hours As Needed for moderate pain (4-6).   3/5/2019 at Unknown time   • lisinopril (PRINIVIL,ZESTRIL) 20 MG tablet Take 20 mg by mouth Daily.   3/5/2019 at Unknown time   • lovastatin (MEVACOR) 40 MG tablet Take 40 mg by mouth Every Night.   3/5/2019 at Unknown time   • warfarin (COUMADIN) 1 MG tablet Take 1 mg by mouth Daily.   3/5/2019 at Unknown time   • oxyCODONE-acetaminophen (PERCOCET) 7.5-325 MG per tablet Take 1-2 tablets by mouth Every 4 (Four) Hours As Needed (Pain). 10 tablet 0          Medical  "Review Of Systems:  Unable to obtain due to patient's confusion, disorientation, poor mental status.        Objective   Vital Signs:  Temp:  [97.6 °F (36.4 °C)-99.2 °F (37.3 °C)] 97.6 °F (36.4 °C)  Heart Rate:  [76-96] 95  Resp:  [16-18] 18  BP: (107-140)/(57-82) 140/78    Physical Exam:   --General Appearance:  alert, hard of hearing, interactive and cooperative  --Hygiene:  fair   --Gait & Station:  Blank multiple: Deferred, in bed  --Musculoskeletal:  No tremors or abnormal involuntary movements and No atrophy noted  --Pulm: unlaboured     Mental Status Exam:   --Cooperation:  Cooperative  --Eye Contact:  Fair  --Psychomotor Behavior:  Variable, slow at times, restless at others  --Mood:  \"OK\"  --Affect:  pleasantly confused  --Speech:  Slow, Soft and Word finding problems  --Thought Process:  Sluggish and Disorganized  --Associations: Disorganized  --Themes:  None overt  --Thought Content:     --Mood congurent   --Suicidal:  Denies    --Homicidal:  Denies   --Hallucinations:  Denies and Not appearing to respond to internal stimuli   --Delusion:  None noted/overt and No overt psychotic overlay  --Cognitive Functioning:   -Consciousness: awake and alert   -Orientation:  Person   -Attention:  Distractible    -Concentration:  Impaired; 0/5 for spelling; unable to perform simple calculations for change/$   -Language:  Average based on interaction & Word Finding Difficulties   -Vocabulary:  Below Average based on interaction & Word Finding Difficulties   -Registration: 0/3;  Delayed Recall: 0/3   -Short Term Memory: Deficits   -Long Term Memory: Deficits   -Fund of Knowledge:  Below Average based on interaction   -Abstraction:  Poor  --Reliability:  impaired  --Insight:  Limited  --Judgment:  Impaired  --Impulse Control:  Impaired      Diagnostic Data:  --> EKG: reviewed; compared to prior EKG of yesterday is QTc elevated >480ms.  Narrative     Test Reason : QT prolongatin  Blood Pressure : **/** mmHG  Vent. Rate : " 087 BPM     Atrial Rate : 087 BPM     P-R Int : 118 ms          QRS Dur : 066 ms      QT Int : 414 ms       P-R-T Axes : 003 054 027 degrees     QTc Int : 498 ms    Sinus rhythm with frequent premature ventricular complexes  Low voltage QRS  Nonspecific ST abnormality  Prolonged QT  Abnormal ECG  When compared with ECG of 08-MAR-2019 09:01,  premature ventricular complexes are now present  premature atrial complexes are no longer present       --> Lab Work  Recent Results (from the past 72 hour(s))   Urinalysis With Microscopic If Indicated (No Culture) - Urine, Catheter    Collection Time: 03/06/19  6:44 PM   Result Value Ref Range    Color, UA Other (A) Yellow, Straw, Dark Yellow, Gilma    Appearance, UA Turbid (A) Clear    pH, UA <=5.0 5.0 - 9.0    Specific [place], UA 1.015 1.003 - 1.030    Glucose, UA >=1000 mg/dL (3+) (A) Negative    Ketones, UA 15 mg/dL (1+) (A) Negative    Bilirubin, UA Moderate (2+) (A) Negative    Blood, UA Large (3+) (A) Negative    Protein, UA >=300 mg/dL (3+) (A) Negative    Leuk Esterase, UA Large (3+) (A) Negative    Nitrite, UA Positive (A) Negative    Urobilinogen, UA 1.0 E.U./dL 0.2 - 1.0 E.U./dL   Urinalysis, Microscopic Only - Urine, Catheter    Collection Time: 03/06/19  6:44 PM   Result Value Ref Range    RBC, UA Too Numerous to Count (A) None Seen /HPF    WBC, UA 6-12 (A) None Seen, 0-2, 3-5 /HPF    Bacteria, UA 1+ (A) None Seen /HPF    Squamous Epithelial Cells, UA 0-2 None Seen, 0-2 /HPF    Yeast, UA Moderate/2+ Budding Yeast w/Hyphae None Seen /HPF    Hyaline Casts, UA None Seen None Seen /LPF    Methodology Manual Light Microscopy    Protime-INR    Collection Time: 03/06/19  8:03 PM   Result Value Ref Range    Protime 18.2 (H) 11.1 - 15.3 Seconds    INR 1.56 (H) 0.80 - 1.20   Comprehensive Metabolic Panel    Collection Time: 03/06/19  8:03 PM   Result Value Ref Range    Glucose 252 (H) 60 - 100 mg/dL    BUN 10 7 - 21 mg/dL    Creatinine 0.81 0.70 - 1.30 mg/dL    Sodium 130  (L) 137 - 145 mmol/L    Potassium 3.0 (L) 3.5 - 5.1 mmol/L    Chloride 92 (L) 95 - 110 mmol/L    CO2 32.0 (H) 22.0 - 31.0 mmol/L    Calcium 8.4 8.4 - 10.2 mg/dL    Total Protein 6.7 6.3 - 8.6 g/dL    Albumin 3.40 3.40 - 4.80 g/dL    ALT (SGPT) 13 (L) 21 - 72 U/L    AST (SGOT) 20 17 - 59 U/L    Alkaline Phosphatase 97 38 - 126 U/L    Total Bilirubin 1.0 0.2 - 1.3 mg/dL    eGFR Non African Amer 91 42 - 98 mL/min/1.73    Globulin 3.3 2.3 - 3.5 gm/dL    A/G Ratio 1.0 (L) 1.1 - 1.8 g/dL    BUN/Creatinine Ratio 12.3 7.0 - 25.0    Anion Gap 6.0 5.0 - 15.0 mmol/L   aPTT    Collection Time: 03/06/19  8:03 PM   Result Value Ref Range    PTT 36.8 20.0 - 40.3 seconds   Lactic Acid, Plasma    Collection Time: 03/06/19  8:03 PM   Result Value Ref Range    Lactate 2.4 (C) 0.5 - 2.0 mmol/L   CBC Auto Differential    Collection Time: 03/06/19  8:03 PM   Result Value Ref Range    WBC 9.82 3.40 - 10.80 10*3/mm3    RBC 4.64 4.14 - 5.80 10*6/mm3    Hemoglobin 14.9 13.0 - 17.7 g/dL    Hematocrit 42.9 37.5 - 51.0 %    MCV 92.5 79.0 - 97.0 fL    MCH 32.1 26.6 - 33.0 pg    MCHC 34.7 31.5 - 35.7 g/dL    RDW 14.4 12.3 - 15.4 %    RDW-SD 48.9 37.0 - 54.0 fl    MPV 9.4 6.0 - 12.0 fL    Platelets 223 140 - 450 10*3/mm3    Neutrophil % 78.3 (H) 42.7 - 76.0 %    Lymphocyte % 14.2 (L) 19.6 - 45.3 %    Monocyte % 6.2 5.0 - 12.0 %    Eosinophil % 0.5 0.3 - 6.2 %    Basophil % 0.6 0.0 - 1.5 %    Immature Grans % 0.2 0.0 - 0.5 %    Neutrophils, Absolute 7.69 (H) 1.40 - 7.00 10*3/mm3    Lymphocytes, Absolute 1.39 0.70 - 3.10 10*3/mm3    Monocytes, Absolute 0.61 0.10 - 0.90 10*3/mm3    Eosinophils, Absolute 0.05 0.00 - 0.40 10*3/mm3    Basophils, Absolute 0.06 0.00 - 0.20 10*3/mm3    Immature Grans, Absolute 0.02 0.00 - 0.05 10*3/mm3    nRBC 0.0 0.0 - 0.0 /100 WBC   Lactic Acid, Reflex Timer (This will reflex a repeat order 3-3:15 hours after ordered.)    Collection Time: 03/06/19  8:03 PM   Result Value Ref Range    Extra Tube Hold for add-ons.    Gold  Top - SST    Collection Time: 03/06/19  8:10 PM   Result Value Ref Range    Extra Tube Hold for add-ons.    Blood Culture - Blood, Arm, Right    Collection Time: 03/06/19  8:19 PM   Result Value Ref Range    Blood Culture No growth at 24 hours    Blood Culture - Blood, Hand, Right    Collection Time: 03/06/19  8:27 PM   Result Value Ref Range    Blood Culture No growth at 24 hours    Urine Culture - Urine, Urine, Clean Catch    Collection Time: 03/06/19  9:25 PM   Result Value Ref Range    Urine Culture >100,000 CFU/mL Candida albicans (A)    Lactic Acid, Reflex    Collection Time: 03/07/19 12:54 AM   Result Value Ref Range    Lactate 1.2 0.5 - 2.0 mmol/L   Urinalysis With Culture If Indicated - Urine, Clean Catch    Collection Time: 03/07/19  3:46 AM   Result Value Ref Range    Color, UA Red (A) Yellow, Straw, Dark Yellow, Gilma    Appearance, UA Turbid (A) Clear    pH, UA 5.5 5.0 - 9.0    Specific Gravity, UA >=1.030 1.003 - 1.030    Glucose,  mg/dL (2+) (A) Negative    Ketones, UA 15 mg/dL (1+) (A) Negative    Bilirubin, UA Moderate (2+) (A) Negative    Blood, UA Large (3+) (A) Negative    Protein,  mg/dL (2+) (A) Negative    Leuk Esterase, UA Small (1+) (A) Negative    Nitrite, UA Negative Negative    Urobilinogen, UA 1.0 E.U./dL 0.2 - 1.0 E.U./dL   Urinalysis, Microscopic Only - Urine, Clean Catch    Collection Time: 03/07/19  3:46 AM   Result Value Ref Range    RBC, UA Too Numerous to Count (A) None Seen /HPF    WBC, UA 6-12 (A) None Seen, 0-2, 3-5 /HPF    Bacteria, UA None Seen None Seen /HPF    Squamous Epithelial Cells, UA 0-2 None Seen, 0-2 /HPF    Hyaline Casts, UA 0-2 None Seen /LPF    Methodology Manual Light Microscopy    Protime-INR    Collection Time: 03/07/19  5:59 AM   Result Value Ref Range    Protime 17.7 (H) 11.1 - 15.3 Seconds    INR 1.51 (H) 0.80 - 1.20   Basic Metabolic Panel    Collection Time: 03/07/19  5:59 AM   Result Value Ref Range    Glucose 154 (H) 60 - 100 mg/dL    BUN 9  7 - 21 mg/dL    Creatinine 0.70 0.70 - 1.30 mg/dL    Sodium 134 (L) 137 - 145 mmol/L    Potassium 2.6 (C) 3.5 - 5.1 mmol/L    Chloride 101 95 - 110 mmol/L    CO2 30.0 22.0 - 31.0 mmol/L    Calcium 7.6 (L) 8.4 - 10.2 mg/dL    eGFR Non  Amer 107 (H) 42 - 98 mL/min/1.73    BUN/Creatinine Ratio 12.9 7.0 - 25.0    Anion Gap 3.0 (L) 5.0 - 15.0 mmol/L   CBC Auto Differential    Collection Time: 03/07/19  5:59 AM   Result Value Ref Range    WBC 9.37 3.40 - 10.80 10*3/mm3    RBC 4.21 4.14 - 5.80 10*6/mm3    Hemoglobin 13.3 13.0 - 17.7 g/dL    Hematocrit 38.6 37.5 - 51.0 %    MCV 91.7 79.0 - 97.0 fL    MCH 31.6 26.6 - 33.0 pg    MCHC 34.5 31.5 - 35.7 g/dL    RDW 14.2 12.3 - 15.4 %    RDW-SD 48.1 37.0 - 54.0 fl    MPV 9.7 6.0 - 12.0 fL    Platelets 198 140 - 450 10*3/mm3    Neutrophil % 68.9 42.7 - 76.0 %    Lymphocyte % 22.2 19.6 - 45.3 %    Monocyte % 6.3 5.0 - 12.0 %    Eosinophil % 1.7 0.3 - 6.2 %    Basophil % 0.7 0.0 - 1.5 %    Immature Grans % 0.2 0.0 - 0.5 %    Neutrophils, Absolute 6.45 1.40 - 7.00 10*3/mm3    Lymphocytes, Absolute 2.08 0.70 - 3.10 10*3/mm3    Monocytes, Absolute 0.59 0.10 - 0.90 10*3/mm3    Eosinophils, Absolute 0.16 0.00 - 0.40 10*3/mm3    Basophils, Absolute 0.07 0.00 - 0.20 10*3/mm3    Immature Grans, Absolute 0.02 0.00 - 0.05 10*3/mm3    nRBC 0.0 0.0 - 0.0 /100 WBC   Hemoglobin A1c    Collection Time: 03/07/19  5:59 AM   Result Value Ref Range    Hemoglobin A1C 7.7 (H) 4 - 5.6 %   Lipid Panel    Collection Time: 03/07/19  5:59 AM   Result Value Ref Range    Total Cholesterol 97 0 - 199 mg/dL    Triglycerides 81 20 - 199 mg/dL    HDL Cholesterol 32 (L) 60 - 200 mg/dL    LDL Cholesterol  53 1 - 129 mg/dL    LDL/HDL Ratio 1.53 0.00 - 3.55   Magnesium    Collection Time: 03/07/19  5:59 AM   Result Value Ref Range    Magnesium 2.0 1.6 - 2.3 mg/dL   TSH    Collection Time: 03/07/19  5:59 AM   Result Value Ref Range    TSH 1.400 0.460 - 4.680 mIU/mL   Lactic Acid, Plasma    Collection Time:  03/07/19  5:59 AM   Result Value Ref Range    Lactate 1.1 0.5 - 2.0 mmol/L   POC Glucose Once    Collection Time: 03/07/19  9:07 AM   Result Value Ref Range    Glucose 169 (H) 70 - 130 mg/dL   POC Glucose Once    Collection Time: 03/07/19 11:53 AM   Result Value Ref Range    Glucose 215 (H) 70 - 130 mg/dL   POC Glucose Once    Collection Time: 03/07/19  4:56 PM   Result Value Ref Range    Glucose 179 (H) 70 - 130 mg/dL   Potassium    Collection Time: 03/07/19  7:27 PM   Result Value Ref Range    Potassium 2.9 (L) 3.5 - 5.1 mmol/L   POC Glucose Once    Collection Time: 03/07/19  8:02 PM   Result Value Ref Range    Glucose 269 (H) 70 - 130 mg/dL   Protime-INR    Collection Time: 03/08/19  5:52 AM   Result Value Ref Range    Protime 20.2 (H) 11.1 - 15.3 Seconds    INR 1.80 (H) 0.80 - 1.20   Comprehensive Metabolic Panel    Collection Time: 03/08/19  5:52 AM   Result Value Ref Range    Glucose 113 (H) 60 - 100 mg/dL    BUN 7 7 - 21 mg/dL    Creatinine 1.18 0.70 - 1.30 mg/dL    Sodium 136 (L) 137 - 145 mmol/L    Potassium 3.7 3.5 - 5.1 mmol/L    Chloride 103 95 - 110 mmol/L    CO2 27.0 22.0 - 31.0 mmol/L    Calcium 7.7 (L) 8.4 - 10.2 mg/dL    Total Protein 5.6 (L) 6.3 - 8.6 g/dL    Albumin 2.70 (L) 3.40 - 4.80 g/dL    ALT (SGPT) 10 (L) 21 - 72 U/L    AST (SGOT) 22 17 - 59 U/L    Alkaline Phosphatase 67 38 - 126 U/L    Total Bilirubin 1.0 0.2 - 1.3 mg/dL    eGFR Non African Amer 59 42 - 98 mL/min/1.73    Globulin 2.9 2.3 - 3.5 gm/dL    A/G Ratio 0.9 (L) 1.1 - 1.8 g/dL    BUN/Creatinine Ratio 5.9 (L) 7.0 - 25.0    Anion Gap 6.0 5.0 - 15.0 mmol/L   CBC Auto Differential    Collection Time: 03/08/19  5:52 AM   Result Value Ref Range    WBC 9.19 3.40 - 10.80 10*3/mm3    RBC 3.96 (L) 4.14 - 5.80 10*6/mm3    Hemoglobin 12.9 (L) 13.0 - 17.7 g/dL    Hematocrit 36.6 (L) 37.5 - 51.0 %    MCV 92.4 79.0 - 97.0 fL    MCH 32.6 26.6 - 33.0 pg    MCHC 35.2 31.5 - 35.7 g/dL    RDW 14.2 12.3 - 15.4 %    RDW-SD 48.0 37.0 - 54.0 fl    MPV  9.4 6.0 - 12.0 fL    Platelets 200 140 - 450 10*3/mm3    Neutrophil % 73.3 42.7 - 76.0 %    Lymphocyte % 16.1 (L) 19.6 - 45.3 %    Monocyte % 7.5 5.0 - 12.0 %    Eosinophil % 1.8 0.3 - 6.2 %    Basophil % 1.0 0.0 - 1.5 %    Immature Grans % 0.3 0.0 - 0.5 %    Neutrophils, Absolute 6.73 1.40 - 7.00 10*3/mm3    Lymphocytes, Absolute 1.48 0.70 - 3.10 10*3/mm3    Monocytes, Absolute 0.69 0.10 - 0.90 10*3/mm3    Eosinophils, Absolute 0.17 0.00 - 0.40 10*3/mm3    Basophils, Absolute 0.09 0.00 - 0.20 10*3/mm3    Immature Grans, Absolute 0.03 0.00 - 0.05 10*3/mm3    nRBC 0.0 0.0 - 0.0 /100 WBC   POC Glucose Once    Collection Time: 03/08/19  7:57 AM   Result Value Ref Range    Glucose 131 (H) 70 - 130 mg/dL   POC Glucose Once    Collection Time: 03/08/19 10:46 AM   Result Value Ref Range    Glucose 119 70 - 130 mg/dL   POC Glucose Once    Collection Time: 03/08/19  4:57 PM   Result Value Ref Range    Glucose 148 (H) 70 - 130 mg/dL     Ct Head Without Contrast    Result Date: 3/7/2019  Narrative: Exam: Head CT without contrast. INDICATION: Confusion state TECHNIQUE: Routine head CT without contrast. Sagittal coronal reconstructions were obtained. This exam was performed according to our departmental dose-optimization program, which includes automated exposure control, adjustment of the mA and/or kV according to patient size and/or use of iterative reconstruction technique. COMPARISON: 10/23/2015 FINDINGS: The bony calvarium is intact. Mild mucosal thickening is ethmoid and frontal sinuses. There is a small amount of fluid in the sphenoid sinuses. Mastoid air cells are clear. No extra-axial fluid collection. Enlargement of the ventricles and sulci compatible with mild generalized cerebral atrophy. Gray-white differentiation is maintained. There is attenuation white matter consistent moderate ischemic changes. 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 No  intraparenchymal hemorrhage, mass or midline shift.     Impression: No obvious acute intracranial abnormality. Recommend follow-up as clinically warranted. Electronically signed by:  Uri Ward MD  3/7/2019 12:32 AM CST Workstation: [id number]    Us Scrotum & Testicles    Result Date: 3/7/2019  Narrative: PROCEDURE: US SCROTUM AND TESTICLES (accession [id number]), US TESTICULAR OR OVARIAN VASCULAR LIMITED (accession [id number]) Clinical History: swelling, A41.9 Sepsis, unspecified organism N39.0 Urinary tract infection, site not specified Indication: Same as above. Comparison: None . Technique: Grayscale and color Doppler evaluation of the testes and the scrotum was done Findings: The right testes measures 2.8 x 2.3 x 2.6  centimeters and the left testes measures 2.4 x 1.9 x 2.7  centimeters. The bilateral testes do not  show any evidence of infiltrative or focal mass lesions. The bilateral testes show normal blood flow. The bilateral epididymides are not well-visualized. There is presence of small bilateral hydroceles There are no varicoceles     Impression: Impression:  Unremarkable bilateral testes. The bilateral epididymides are not well-visualized. There is presence of small bilateral hydroceles   Electronically signed by:  Brian Carvajal MD  3/7/2019 2:15 PM CST Workstation: [id number]    Xr Chest 1 View    Result Date: 3/7/2019  Narrative: PROCEDURE: XR CHEST 1 VIEW HISTORY: sepsis, A41.9 Sepsis, unspecified organism N39.0 Urinary tract infection, site not specified COMPARISON: 10/23/2015 TECHNIQUE: Single projection of the chest was done. FINDINGS: An artifact obscures the left CP angle . Stable parenchymal scarring is seen in the left upper lung zone, unchanged since 10/23/2015 There are no discrete airspace infiltrates, pneumothoraces or pleural effusions in the evaluated bilateral lung fields. The pulmonary vascularity is normal. The cardiomediastinal silhouette is stable.     Impression:  There is no acute pleural-parenchymal process seen in the imaged lung fields. Stable parenchymal scarring is seen in the left upper lung zone, unchanged since 10/23/2015 Electronically signed by:  Brian Carvajal MD  3/7/2019 7:15 AM CST Workstation: [id number]     Testicular Or Ovarian Vascular Limited    Result Date: 3/7/2019  Narrative: PROCEDURE: US SCROTUM AND TESTICLES (accession [id number]), US TESTICULAR OR OVARIAN VASCULAR LIMITED (accession [id number]) Clinical History: swelling, A41.9 Sepsis, unspecified organism N39.0 Urinary tract infection, site not specified Indication: Same as above. Comparison: None . Technique: Grayscale and color Doppler evaluation of the testes and the scrotum was done Findings: The right testes measures 2.8 x 2.3 x 2.6  centimeters and the left testes measures 2.4 x 1.9 x 2.7  centimeters. The bilateral testes do not  show any evidence of infiltrative or focal mass lesions. The bilateral testes show normal blood flow. The bilateral epididymides are not well-visualized. There is presence of small bilateral hydroceles There are no varicoceles     Impression: Impression:  Unremarkable bilateral testes. The bilateral epididymides are not well-visualized. There is presence of small bilateral hydroceles   Electronically signed by:  Brian Carvajal MD  3/7/2019 2:15 PM CST Workstation: [id number]       --> Neuroimaging: cortical atrophy, reviewed, as noted above      Assessment/Plan     --> Diagnostic Impression: Mr. Guzman  is a 84 y.o. male consulted for QTc prolongation on Celexa in setting of long-standing, but well treated, depression.      History would support more of the vascular etiology to his depression and concern for possible pseudobulbar affect.  Given the black box warning with citalopram for patient's age and QTC prolongation, concur with its discontinuation.  Given possibility of life-threatening adverse events from QTC prolongation, we will plan to use a short  course of Prozac 10 mg for 2 days, given its very low propensity of QTC prolongation that is minimal, and then discontinue to minimize any serotonin withdrawal.  The long half-life of Prozac should cover the patient for approximately 2 weeks.  Once antibiotics have been discontinued and QTC is normalized, can reassess need for Celexa or another SSRI that may have less of an impact on QTC.  This can be done either as an inpatient or follow-up with outpatient behavioral health.      Assessment:    Acute UTI (urinary tract infection)    Phimosis    Sepsis secondary to UTI (CMS/HCC)    Encephalopathy, metabolic    Hyponatremia    Type 2 diabetes mellitus (CMS/HCC)    History of DVT (deep vein thrombosis)    Essential hypertension    Hypokalemia    --Concern for acute delirum  --Major Depression, controlled on SSRI  --Concern for PBA s/p CVA      Recommendations:  --Concur w/ stopping Celexa  --Start Prozac 10mg x 2 days to aid w/ 5HT withdrawal.  Minimal QTc prolongation    All questions answered for the patient and his daughters.    Impression and recommendations discussed with nursing staff.    Psychiatry will continue to follow, as needed.      Thank you for this consult.  Please contact me with any further questions or concerns.     Shilo Ortega II, MD  Psychiatry & Behavioral Sciences  03/08/19  6:07 PM  Dictated using Dragon.

## 2019-03-09 NOTE — PLAN OF CARE
Problem: Patient Care Overview  Goal: Plan of Care Review  Outcome: Ongoing (interventions implemented as appropriate)   03/09/19 0141   Coping/Psychosocial   Plan of Care Reviewed With patient   Plan of Care Review   Progress no change   OTHER   Outcome Summary pt vs stable and resting comfortably at this time. no new events throughout the night. will continue to monitor.     Goal: Individualization and Mutuality  Outcome: Ongoing (interventions implemented as appropriate)    Goal: Discharge Needs Assessment  Outcome: Ongoing (interventions implemented as appropriate)    Goal: Interprofessional Rounds/Family Conf  Outcome: Ongoing (interventions implemented as appropriate)      Problem: Skin Injury Risk (Adult)  Goal: Skin Health and Integrity  Outcome: Ongoing (interventions implemented as appropriate)      Problem: Urinary Tract Infection (Adult)  Goal: Signs and Symptoms of Listed Potential Problems Will be Absent, Minimized or Managed (Urinary Tract Infection)  Outcome: Ongoing (interventions implemented as appropriate)      Problem: Pain, Acute (Adult)  Goal: Acceptable Pain Control/Comfort Level  Outcome: Ongoing (interventions implemented as appropriate)      Problem: Fluid Volume Deficit (Adult)  Goal: Identify Related Risk Factors and Signs and Symptoms  Outcome: Ongoing (interventions implemented as appropriate)    Goal: Optimal Fluid Balance  Outcome: Ongoing (interventions implemented as appropriate)      Problem: Confusion, Acute (Adult)  Goal: Identify Related Risk Factors and Signs and Symptoms  Outcome: Ongoing (interventions implemented as appropriate)    Goal: Cognitive/Functional Impairments Minimized  Outcome: Ongoing (interventions implemented as appropriate)    Goal: Safety  Outcome: Ongoing (interventions implemented as appropriate)         Personally reviewed labs.   Personally reviewed imaging. CXR clear.  Personally reviewed EKG. EKG at OSH SVT at 179bpm with ST-depressions throughout and elevation in aVR. EKG on presentation to [facility] NSR at 65 bpm.                          16.2   6.4   )-----------( 232      ( 25 Oct 2017 19:26 )             48.3       10-25    137  |  101  |  17  ----------------------------<  135<H>  3.6   |  22  |  0.95    Ca    9.7      25 Oct 2017 19:26    TPro  7.8  /  Alb  4.0  /  TBili  0.8  /  DBili  x   /  AST  31  /  ALT  23  /  AlkPhos  58  10-25    CARDIAC MARKERS ( 25 Oct 2017 19:26 )  .000 ng/mL / x     / x     / x     / 0.8 ng/mL    LIVER FUNCTIONS - ( 25 Oct 2017 19:26 )  Alb: 4.0 g/dL / Pro: 7.8 g/dL / ALK PHOS: 58 U/L / ALT: 23 U/L DA / AST: 31 U/L / GGT: x           PT/INR - ( 25 Oct 2017 19:26 )   PT: 9.8 sec;   INR: 0.90 ratio     PTT - ( 25 Oct 2017 19:26 )  PTT:30.4 sec

## 2019-03-09 NOTE — PROGRESS NOTES
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  UA is consistent with UTI.  He was admitted on broad spectrum antibiotics and his confusion has improved.  Ultrasounds were unremarkable.  Urology consulted and recommended antibiotics, mycolog and silvadene creams, and probable voiding trial.  Cultures grew candida and he has been de-escalated to diflucan.  He is chronically on celexa for depression.  EKG revealed prolonged QT.  Psychiatry is consulted and recommended starting prozac for to minimize serotonin withdrawal.  Cline has been removed.    Review of Systems   Constitutional: Positive for fatigue. Negative for chills and fever.   Respiratory: Negative for shortness of breath.    Cardiovascular: Negative for chest pain.   Gastrointestinal: Negative for abdominal pain, diarrhea, nausea and vomiting.   Genitourinary: Positive for difficulty urinating, penile pain, penile swelling and scrotal swelling.        All pertinent negatives and positives are as above. All other systems have been reviewed and are negative unless otherwise stated.     Objective    Temp:  [96.8 °F (36 °C)-98.5 °F (36.9 °C)] 98.3 °F (36.8 °C)  Heart Rate:  [73-98] 93  Resp:  [18] 18  BP: (136-159)/(68-79) 139/79    Physical Exam   Constitutional: He is oriented to person, place, and time. He appears well-developed and well-nourished. No distress.   HENT:   Head: Normocephalic.   Eyes: Conjunctivae are normal.   Cardiovascular: Normal rate, regular rhythm, normal heart sounds and intact distal pulses.   Pulmonary/Chest: Effort  normal and breath sounds normal. No respiratory distress.   Abdominal: Soft. Bowel sounds are normal. He exhibits no distension. There is no tenderness.   Musculoskeletal: Normal range of motion. He exhibits no edema.   Neurological: He is alert and oriented to person, place, and time.   Skin: Skin is warm and dry. He is not diaphoretic. No erythema.   Vitals reviewed.          Results Review:  I have reviewed the labs, radiology results, and diagnostic studies.    Laboratory Data:   Results from last 7 days   Lab Units 03/09/19 0650 03/08/19 0552 03/07/19 1927 03/07/19 0559 03/06/19 2003   SODIUM mmol/L 134* 136*  --  134* 130*   POTASSIUM mmol/L 3.4* 3.7 2.9* 2.6* 3.0*   CHLORIDE mmol/L 105 103  --  101 92*   CO2 mmol/L 24.0 27.0  --  30.0 32.0*   BUN mg/dL 6* 7  --  9 10   CREATININE mg/dL 1.02 1.18  --  0.70 0.81   GLUCOSE mg/dL 101* 113*  --  154* 252*   CALCIUM mg/dL 8.2* 7.7*  --  7.6* 8.4   BILIRUBIN mg/dL 0.8 1.0  --   --  1.0   ALK PHOS U/L 72 67  --   --  97   ALT (SGPT) U/L 10* 10*  --   --  13*   AST (SGOT) U/L 22 22  --   --  20   ANION GAP mmol/L 5.0 6.0  --  3.0* 6.0     Estimated Creatinine Clearance: 53.6 mL/min (by C-G formula based on SCr of 1.02 mg/dL).  Results from last 7 days   Lab Units 03/07/19 0559   MAGNESIUM mg/dL 2.0         Results from last 7 days   Lab Units 03/09/19 0650 03/08/19 0552 03/07/19 0559 03/06/19 2003   WBC 10*3/mm3 9.05 9.19 9.37 9.82   HEMOGLOBIN g/dL 13.6 12.9* 13.3 14.9   HEMATOCRIT % 38.9 36.6* 38.6 42.9   PLATELETS 10*3/mm3 202 200 198 223     Results from last 7 days   Lab Units 03/09/19 0650 03/08/19  0552 03/07/19  0559 03/06/19  2003   INR  2.29* 1.80* 1.51* 1.56*       Culture Data:   Blood Culture   Date Value Ref Range Status   03/06/2019 No growth at 2 days  Preliminary   03/06/2019 No growth at 2 days  Preliminary     Urine Culture   Date Value Ref Range Status   03/06/2019 >100,000 CFU/mL Candida albicans (A)  Final     No results found for:  RESPCX  No results found for: WOUNDCX  No results found for: STOOLCX  No components found for: BODYFLD    Radiology Data:   Imaging Results (last 24 hours)     ** No results found for the last 24 hours. **          I have reviewed the patient's current medications.     Assessment/Plan     Active Hospital Problems    Diagnosis   • **Acute UTI (urinary tract infection)   • Encephalopathy, metabolic   • Hyponatremia   • Type 2 diabetes mellitus (CMS/HCC)   • History of DVT (deep vein thrombosis)   • Essential hypertension   • Hypokalemia   • Sepsis secondary to UTI (CMS/HCC)   • Phimosis     Added automatically from request for surgery [id number]         Plan:    Omnicef day day 2/7  Diflucan day 2/14  Urology consultation appreciated  Almeida removed, monitor voiding  Hold Celexa  Psychiatry consultation appreciated  Prozac 10 mg daily  Cardiac monitoring, monitor QT  Replace potassium by protocol  Pharmacy to dose coumadin  PT/OT        This document has been electronically signed by KRISTINA Stack on March 9, 2019 10:58 AM

## 2019-03-10 LAB
ANION GAP SERPL CALCULATED.3IONS-SCNC: 7 MMOL/L (ref 5–15)
BASOPHILS # BLD AUTO: 0.06 10*3/MM3 (ref 0–0.2)
BASOPHILS NFR BLD AUTO: 0.7 % (ref 0–1.5)
BUN BLD-MCNC: 5 MG/DL (ref 7–21)
BUN/CREAT SERPL: 5.2 (ref 7–25)
CALCIUM SPEC-SCNC: 8.2 MG/DL (ref 8.4–10.2)
CHLORIDE SERPL-SCNC: 105 MMOL/L (ref 95–110)
CO2 SERPL-SCNC: 22 MMOL/L (ref 22–31)
CREAT BLD-MCNC: 0.97 MG/DL (ref 0.7–1.3)
DEPRECATED RDW RBC AUTO: 50.2 FL (ref 37–54)
EOSINOPHIL # BLD AUTO: 0.32 10*3/MM3 (ref 0–0.4)
EOSINOPHIL NFR BLD AUTO: 3.8 % (ref 0.3–6.2)
ERYTHROCYTE [DISTWIDTH] IN BLOOD BY AUTOMATED COUNT: 14.5 % (ref 12.3–15.4)
GFR SERPL CREATININE-BSD FRML MDRD: 74 ML/MIN/1.73 (ref 42–98)
GLUCOSE BLD-MCNC: 102 MG/DL (ref 60–100)
GLUCOSE BLDC GLUCOMTR-MCNC: 100 MG/DL (ref 70–130)
GLUCOSE BLDC GLUCOMTR-MCNC: 175 MG/DL (ref 70–130)
GLUCOSE BLDC GLUCOMTR-MCNC: 217 MG/DL (ref 70–130)
GLUCOSE BLDC GLUCOMTR-MCNC: 76 MG/DL (ref 70–130)
GLUCOSE BLDC GLUCOMTR-MCNC: 97 MG/DL (ref 70–130)
HCT VFR BLD AUTO: 39.5 % (ref 37.5–51)
HGB BLD-MCNC: 13.6 G/DL (ref 13–17.7)
IMM GRANULOCYTES # BLD AUTO: 0.02 10*3/MM3 (ref 0–0.05)
IMM GRANULOCYTES NFR BLD AUTO: 0.2 % (ref 0–0.5)
INR PPP: 2.76 (ref 0.8–1.2)
LYMPHOCYTES # BLD AUTO: 1.54 10*3/MM3 (ref 0.7–3.1)
LYMPHOCYTES NFR BLD AUTO: 18.1 % (ref 19.6–45.3)
MAGNESIUM SERPL-MCNC: 1.9 MG/DL (ref 1.6–2.3)
MCH RBC QN AUTO: 32.2 PG (ref 26.6–33)
MCHC RBC AUTO-ENTMCNC: 34.4 G/DL (ref 31.5–35.7)
MCV RBC AUTO: 93.6 FL (ref 79–97)
MONOCYTES # BLD AUTO: 0.72 10*3/MM3 (ref 0.1–0.9)
MONOCYTES NFR BLD AUTO: 8.4 % (ref 5–12)
NEUTROPHILS # BLD AUTO: 5.87 10*3/MM3 (ref 1.4–7)
NEUTROPHILS NFR BLD AUTO: 68.8 % (ref 42.7–76)
NRBC BLD AUTO-RTO: 0 /100 WBC (ref 0–0)
PLATELET # BLD AUTO: 222 10*3/MM3 (ref 140–450)
PMV BLD AUTO: 9.3 FL (ref 6–12)
POTASSIUM BLD-SCNC: 3.6 MMOL/L (ref 3.5–5.1)
POTASSIUM BLD-SCNC: 3.6 MMOL/L (ref 3.5–5.1)
PROTHROMBIN TIME: 27.9 SECONDS (ref 11.1–15.3)
RBC # BLD AUTO: 4.22 10*6/MM3 (ref 4.14–5.8)
SODIUM BLD-SCNC: 134 MMOL/L (ref 137–145)
WBC NRBC COR # BLD: 8.53 10*3/MM3 (ref 3.4–10.8)

## 2019-03-10 PROCEDURE — 93005 ELECTROCARDIOGRAM TRACING: CPT | Performed by: NURSE PRACTITIONER

## 2019-03-10 PROCEDURE — 93010 ELECTROCARDIOGRAM REPORT: CPT | Performed by: INTERNAL MEDICINE

## 2019-03-10 PROCEDURE — 85025 COMPLETE CBC W/AUTO DIFF WBC: CPT | Performed by: NURSE PRACTITIONER

## 2019-03-10 PROCEDURE — 85610 PROTHROMBIN TIME: CPT | Performed by: INTERNAL MEDICINE

## 2019-03-10 PROCEDURE — 83735 ASSAY OF MAGNESIUM: CPT | Performed by: NURSE PRACTITIONER

## 2019-03-10 PROCEDURE — 99232 SBSQ HOSP IP/OBS MODERATE 35: CPT | Performed by: PSYCHIATRY & NEUROLOGY

## 2019-03-10 PROCEDURE — 63710000001 INSULIN ASPART PER 5 UNITS: Performed by: INTERNAL MEDICINE

## 2019-03-10 PROCEDURE — 80048 BASIC METABOLIC PNL TOTAL CA: CPT | Performed by: NURSE PRACTITIONER

## 2019-03-10 PROCEDURE — 94799 UNLISTED PULMONARY SVC/PX: CPT

## 2019-03-10 PROCEDURE — 94760 N-INVAS EAR/PLS OXIMETRY 1: CPT

## 2019-03-10 PROCEDURE — 84132 ASSAY OF SERUM POTASSIUM: CPT | Performed by: NURSE PRACTITIONER

## 2019-03-10 PROCEDURE — 82962 GLUCOSE BLOOD TEST: CPT

## 2019-03-10 RX ORDER — WARFARIN SODIUM 1 MG
0.5 TABLET ORAL
Status: DISCONTINUED | OUTPATIENT
Start: 2019-03-10 | End: 2019-03-11 | Stop reason: HOSPADM

## 2019-03-10 RX ADMIN — SILVER SULFADIAZINE: 10 CREAM TOPICAL at 09:56

## 2019-03-10 RX ADMIN — POTASSIUM CHLORIDE 40 MEQ: 750 CAPSULE, EXTENDED RELEASE ORAL at 09:58

## 2019-03-10 RX ADMIN — CEFDINIR 300 MG: 300 CAPSULE ORAL at 09:54

## 2019-03-10 RX ADMIN — Medication 0.5 MG: at 17:20

## 2019-03-10 RX ADMIN — NYSTATIN: 100000 POWDER TOPICAL at 09:56

## 2019-03-10 RX ADMIN — NYSTATIN: 100000 POWDER TOPICAL at 20:26

## 2019-03-10 RX ADMIN — SODIUM CHLORIDE, PRESERVATIVE FREE 3 ML: 5 INJECTION INTRAVENOUS at 20:25

## 2019-03-10 RX ADMIN — ATORVASTATIN CALCIUM 10 MG: 10 TABLET, FILM COATED ORAL at 09:54

## 2019-03-10 RX ADMIN — CEFDINIR 300 MG: 300 CAPSULE ORAL at 20:25

## 2019-03-10 RX ADMIN — SODIUM CHLORIDE 100 ML/HR: 900 INJECTION, SOLUTION INTRAVENOUS at 13:47

## 2019-03-10 RX ADMIN — POTASSIUM CHLORIDE 40 MEQ: 750 CAPSULE, EXTENDED RELEASE ORAL at 13:56

## 2019-03-10 RX ADMIN — NYSTATIN AND TRIAMCINOLONE ACETONIDE: 100000; 1 CREAM TOPICAL at 20:25

## 2019-03-10 RX ADMIN — FLUCONAZOLE 200 MG: 200 TABLET ORAL at 09:54

## 2019-03-10 RX ADMIN — IPRATROPIUM BROMIDE AND ALBUTEROL SULFATE 3 ML: 2.5; .5 SOLUTION RESPIRATORY (INHALATION) at 12:20

## 2019-03-10 RX ADMIN — SODIUM CHLORIDE 100 ML/HR: 900 INJECTION, SOLUTION INTRAVENOUS at 01:26

## 2019-03-10 RX ADMIN — POTASSIUM CHLORIDE 40 MEQ: 750 CAPSULE, EXTENDED RELEASE ORAL at 20:25

## 2019-03-10 RX ADMIN — INSULIN ASPART 4 UNITS: 100 INJECTION, SOLUTION INTRAVENOUS; SUBCUTANEOUS at 17:19

## 2019-03-10 RX ADMIN — IPRATROPIUM BROMIDE AND ALBUTEROL SULFATE 3 ML: 2.5; .5 SOLUTION RESPIRATORY (INHALATION) at 19:15

## 2019-03-10 RX ADMIN — INSULIN ASPART 2 UNITS: 100 INJECTION, SOLUTION INTRAVENOUS; SUBCUTANEOUS at 12:01

## 2019-03-10 RX ADMIN — NYSTATIN AND TRIAMCINOLONE ACETONIDE: 100000; 1 CREAM TOPICAL at 09:56

## 2019-03-10 RX ADMIN — ASPIRIN 81 MG: 81 TABLET, COATED ORAL at 09:54

## 2019-03-10 RX ADMIN — TAMSULOSIN HYDROCHLORIDE 0.4 MG: 0.4 CAPSULE ORAL at 20:25

## 2019-03-10 RX ADMIN — SODIUM CHLORIDE 100 ML/HR: 900 INJECTION, SOLUTION INTRAVENOUS at 22:02

## 2019-03-10 RX ADMIN — FAMOTIDINE 40 MG: 40 TABLET ORAL at 09:54

## 2019-03-10 NOTE — PLAN OF CARE
Problem: Patient Care Overview  Goal: Plan of Care Review  Outcome: Ongoing (interventions implemented as appropriate)   03/10/19 0456   Coping/Psychosocial   Plan of Care Reviewed With patient   Plan of Care Review   Progress no change     Goal: Individualization and Mutuality  Outcome: Ongoing (interventions implemented as appropriate)    Goal: Discharge Needs Assessment  Outcome: Ongoing (interventions implemented as appropriate)    Goal: Interprofessional Rounds/Family Conf  Outcome: Ongoing (interventions implemented as appropriate)      Problem: Skin Injury Risk (Adult)  Goal: Skin Health and Integrity  Outcome: Ongoing (interventions implemented as appropriate)      Problem: Urinary Tract Infection (Adult)  Goal: Signs and Symptoms of Listed Potential Problems Will be Absent, Minimized or Managed (Urinary Tract Infection)  Outcome: Ongoing (interventions implemented as appropriate)      Problem: Pain, Acute (Adult)  Goal: Acceptable Pain Control/Comfort Level  Outcome: Ongoing (interventions implemented as appropriate)      Problem: Fluid Volume Deficit (Adult)  Goal: Identify Related Risk Factors and Signs and Symptoms  Outcome: Ongoing (interventions implemented as appropriate)    Goal: Optimal Fluid Balance  Outcome: Ongoing (interventions implemented as appropriate)      Problem: Confusion, Acute (Adult)  Goal: Identify Related Risk Factors and Signs and Symptoms  Outcome: Ongoing (interventions implemented as appropriate)    Goal: Cognitive/Functional Impairments Minimized  Outcome: Ongoing (interventions implemented as appropriate)    Goal: Safety  Outcome: Ongoing (interventions implemented as appropriate)

## 2019-03-10 NOTE — PROGRESS NOTES
"Psychiatry & Behavioral Health Inpatient Consult Progress Note                                      3/10/2019    HD: #3    Referring Provider: KRISTINA Monsalve    Reason for Consultation & CC: depression and QTc prolongation    Subjective --  Mr. Arnoldo Guzman is a 84 y.o. male who was seen on the 4W unit.      Pleasantly confused.  Resting in bed, watching TV.  Pleasant.  Engaged well.  Not orientated to location, situation or time.     Denies any depression or SI.      Denies any HA/SA/N/V.    Daughters not present at time of my interview.    Per RN staff, no behavioral issues.  No reports of any crying spells.         Objective   Objective --      Vital Signs:  Temp:  [98 °F (36.7 °C)-98.7 °F (37.1 °C)] 98.2 °F (36.8 °C)  Heart Rate:  [] 103  Resp:  [18-22] 22  BP: (113-156)/(71-85) 113/71    --> EKG: reviewed from today, QTc reduced to 480ms, SR w/ PVCs; compared to yesterdayQTc 498 ms, SR w/ PVCs    Physical Exam:   --General Appearance:  alert, hard of hearing, interactive and cooperative  --Hygiene:  fair   --Gait & Station:  Blank multiple: Deferred, in bed  --Musculoskeletal:  No tremors or abnormal involuntary movements and No atrophy noted  --Pulm: unlaboured      Mental Status Exam:   --Cooperation:  Cooperative  --Eye Contact:  Fair  --Psychomotor Behavior:  More appropriate today  --Mood:  \"Pretty Good!\"  --Affect:  pleasantly confused; no overt dysphoria  --Speech:  Slow, Soft abut improving  --Thought Process:  Less Sluggish and Slightly more organized  --Associations: Disorganized  --Themes:  None overt  --Thought Content:                --Mood congurent              --Suicidal:  Denies               --Homicidal:  Denies              --Hallucinations:  Denies and Not appearing to respond to internal stimuli              --Delusion:  None noted/overt and No overt psychotic overlay  --Cognitive Functioning:              -Consciousness: awake and alert              -Orientation:  Person        "       -Attention:  Distractible                         -Fund of Knowledge:  Below Average based on interaction  --Reliability:  impaired  --Insight:  Limited  --Judgment:  Impaired  --Impulse Control:  Impaired       Diagnostic Data --  Lab Results (last 24 hours)     Procedure Component Value Units Date/Time    POC Glucose Once [[id number]]  (Abnormal) Collected:  03/10/19 1046    Specimen:  Blood Updated:  03/10/19 1101     Glucose 175 mg/dL      Comment: RN NotifiedOperator: [id number] LUANN Blackwood ID: [id number]       POC Glucose Once [[id number]]  (Normal) Collected:  03/10/19 0724    Specimen:  Blood Updated:  03/10/19 0802     Glucose 100 mg/dL      Comment: RN NotifiedOperator: [id number] CURTIS Badillo ID: [id number]       Protime-INR [[id number]]  (Abnormal) Collected:  03/10/19 0623    Specimen:  Blood Updated:  03/10/19 0707     Protime 27.9 Seconds      INR 2.76    Narrative:       Therapeutic range for most indications is 2.0-3.0 INR,  or 2.5-3.5 for mechanical heart valves.    Basic Metabolic Panel [[id number]]  (Abnormal) Collected:  03/10/19 0623    Specimen:  Blood Updated:  03/10/19 0651     Glucose 102 mg/dL      BUN 5 mg/dL      Creatinine 0.97 mg/dL      Sodium 134 mmol/L      Potassium 3.6 mmol/L      Chloride 105 mmol/L      CO2 22.0 mmol/L      Calcium 8.2 mg/dL      eGFR Non African Amer 74 mL/min/1.73      BUN/Creatinine Ratio 5.2     Anion Gap 7.0 mmol/L     Narrative:       The MDRD GFR formula is only valid for adults with stable renal function between ages 18 and 70.    CBC & Differential [[id number]] Collected:  03/10/19 0623    Specimen:  Blood Updated:  03/10/19 0639    Narrative:       The following orders were created for panel order CBC & Differential.  Procedure                               Abnormality         Status                     ---------                               -----------         ------                     CBC Auto Differential[[id number]]         Abnormal            Final result                 Please view results for these tests on the individual orders.    CBC Auto Differential [[id number]]  (Abnormal) Collected:  03/10/19 0623    Specimen:  Blood Updated:  03/10/19 0639     WBC 8.53 10*3/mm3      RBC 4.22 10*6/mm3      Hemoglobin 13.6 g/dL      Hematocrit 39.5 %      MCV 93.6 fL      MCH 32.2 pg      MCHC 34.4 g/dL      RDW 14.5 %      RDW-SD 50.2 fl      MPV 9.3 fL      Platelets 222 10*3/mm3      Neutrophil % 68.8 %      Lymphocyte % 18.1 %      Monocyte % 8.4 %      Eosinophil % 3.8 %      Basophil % 0.7 %      Immature Grans % 0.2 %      Neutrophils, Absolute 5.87 10*3/mm3      Lymphocytes, Absolute 1.54 10*3/mm3      Monocytes, Absolute 0.72 10*3/mm3      Eosinophils, Absolute 0.32 10*3/mm3      Basophils, Absolute 0.06 10*3/mm3      Immature Grans, Absolute 0.02 10*3/mm3      nRBC 0.0 /100 WBC     Blood Culture - Blood, Arm, Right [[id number]] Collected:  03/06/19 2019    Specimen:  Blood from Arm, Right Updated:  03/09/19 2100     Blood Culture No growth at 3 days    Blood Culture - Blood, Hand, Right [[id number]] Collected:  03/06/19 2027    Specimen:  Blood from Hand, Right Updated:  03/09/19 2100     Blood Culture No growth at 3 days    POC Glucose Once [[id number]]  (Normal) Collected:  03/09/19 1916    Specimen:  Blood Updated:  03/09/19 1953     Glucose 116 mg/dL      Comment: RN NotifiedOperator: [id number] MARY GABRIELLEMetbeverly ID: [id number]       Potassium [[id number]]  (Normal) Collected:  03/09/19 1759    Specimen:  Blood Updated:  03/09/19 1817     Potassium 3.9 mmol/L     POC Glucose Once [[id number]]  (Abnormal) Collected:  03/09/19 1653    Specimen:  Blood Updated:  03/09/19 1733     Glucose 173 mg/dL      Comment: RN NotifiedOperator: [id number] ACEVEDO MATIAHMeter ID: [id number]             Imaging Results (last 24 hours)     ** No results found for the last 24 hours. **            Medications:   Scheduled Meds:    aspirin 81 mg Oral  Daily   atorvastatin 10 mg Oral Daily   cefdinir 300 mg Oral Q12H   docusate sodium 100 mg Oral Daily   famotidine 40 mg Oral Daily   fluconazole 200 mg Oral Daily   insulin aspart 0-9 Units Subcutaneous 4x Daily AC & at Bedtime   ipratropium-albuterol 3 mL Nebulization Q6H - RT   nystatin  Topical Q12H   nystatin-triamcinolone  Topical Q12H   silver sulfadiazine  Topical Q24H   sodium chloride 3 mL Intravenous Q12H   sodium chloride      tamsulosin 0.4 mg Oral Nightly   warfarin 0.5 mg Oral Daily     Continuous Infusions:    Pharmacy to dose warfarin     sodium chloride 100 mL/hr Last Rate: 100 mL/hr (03/10/19 1347)     PRN Meds:.dextrose  •  dextrose  •  glucagon (human recombinant)  •  magnesium sulfate **OR** magnesium sulfate **OR** magnesium sulfate  •  Morphine **AND** naloxone  •  ondansetron  •  Pharmacy to dose warfarin  •  potassium chloride **OR** potassium chloride **OR** potassium chloride  •  sodium chloride      Assessment:     Acute UTI (urinary tract infection)    Phimosis    Sepsis secondary to UTI (CMS/HCC)    Encephalopathy, metabolic    Hyponatremia    Type 2 diabetes mellitus (CMS/HCC)    History of DVT (deep vein thrombosis)    Essential hypertension    Hypokalemia    --Probable acute delirum, improving  --Major Depression, controlled on SSRI  --Concern for PBA s/p CVA      DIAGNOSTIC IMPRESSION: No significant affective issues.  Tolerated two doses of Prozac w/out issue.  No crying spells nor affective sx reported today.       Treatment Plan & Recommendations:  --S/p Prozac 10mg x 2 days (3/9/19) to aid w/ 5HT withdrawal.  Minimal QTc prolongation  --Continue to monitor for any worsening affect.    --Once antibiotics have been discontinued and QTC is normalized, can reassess need for Celexa or another SSRI that may have less of an impact on QTC.  This can be done either as an inpatient or follow-up with outpatient behavioral health.    All questions answered for the patient.     Impression  and recommendations discussed with nursing staff.     Psychiatry will continue to follow, as needed.         Shilo Ortega II, MD  Psychiatry & Behavioral Sciences  03/10/19 @ 4:12 PM  Dictated using Dragon.

## 2019-03-10 NOTE — SIGNIFICANT NOTE
03/10/19 1404   Rehab Treatment   Discipline physical therapy assistant   Reason Treatment Not Performed patient/family declined treatment  (has been up in recliner all day, just returned to bed, nsg states same, defers PT this date)

## 2019-03-10 NOTE — PROGRESS NOTES
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  UA is consistent with UTI.  He was admitted on broad spectrum antibiotics and his confusion has improved.  Ultrasounds were unremarkable.  Urology consulted and recommended antibiotics, mycolog and silvadene creams, and probable voiding trial.  Cultures grew candida and he has been de-escalated to diflucan.  He is chronically on celexa for depression.  EKG revealed prolonged QT.  Psychiatry is consulted and recommended starting prozac for to minimize serotonin withdrawal.  Cline has been removed. QT remains prolonged but improved.  No new complaints.     Review of Systems   Constitutional: Negative for chills and fever.   Respiratory: Negative for shortness of breath.    Cardiovascular: Negative for chest pain.   Gastrointestinal: Negative for abdominal pain, diarrhea, nausea and vomiting.        All pertinent negatives and positives are as above. All other systems have been reviewed and are negative unless otherwise stated.     Objective    Temp:  [97.7 °F (36.5 °C)-98.7 °F (37.1 °C)] 98.2 °F (36.8 °C)  Heart Rate:  [74-95] 89  Resp:  [18-20] 20  BP: (126-156)/(76-85) 156/76    Physical Exam   Constitutional: He is oriented to person, place, and time. He appears well-developed and well-nourished. No distress.   HENT:   Head: Normocephalic.   Eyes: Conjunctivae are normal.   Cardiovascular: Normal rate, regular rhythm, normal heart sounds and intact distal pulses.   Pulmonary/Chest: Effort normal and breath sounds normal. No respiratory distress.    Abdominal: Soft. Bowel sounds are normal. He exhibits no distension. There is no tenderness.   Genitourinary:   Genitourinary Comments: Penile erythema and swelling improved   Musculoskeletal: Normal range of motion. He exhibits no edema.   Neurological: He is alert and oriented to person, place, and time.   Skin: Skin is warm and dry. He is not diaphoretic. No erythema.   Vitals reviewed.          Results Review:  I have reviewed the labs, radiology results, and diagnostic studies.    Laboratory Data:   Results from last 7 days   Lab Units 03/10/19  0623 03/09/19  1759 03/09/19 0650 03/08/19 0552 03/06/19 2003   SODIUM mmol/L 134*  --  134* 136*   < > 130*   POTASSIUM mmol/L 3.6 3.9 3.4* 3.7   < > 3.0*   CHLORIDE mmol/L 105  --  105 103   < > 92*   CO2 mmol/L 22.0  --  24.0 27.0   < > 32.0*   BUN mg/dL 5*  --  6* 7   < > 10   CREATININE mg/dL 0.97  --  1.02 1.18   < > 0.81   GLUCOSE mg/dL 102*  --  101* 113*   < > 252*   CALCIUM mg/dL 8.2*  --  8.2* 7.7*   < > 8.4   BILIRUBIN mg/dL  --   --  0.8 1.0  --  1.0   ALK PHOS U/L  --   --  72 67  --  97   ALT (SGPT) U/L  --   --  10* 10*  --  13*   AST (SGOT) U/L  --   --  22 22  --  20   ANION GAP mmol/L 7.0  --  5.0 6.0   < > 6.0    < > = values in this interval not displayed.     Estimated Creatinine Clearance: 56.3 mL/min (by C-G formula based on SCr of 0.97 mg/dL).  Results from last 7 days   Lab Units 03/07/19 0559   MAGNESIUM mg/dL 2.0         Results from last 7 days   Lab Units 03/10/19  0623 03/09/19  0650 03/08/19 0552 03/07/19 0559 03/06/19 2003   WBC 10*3/mm3 8.53 9.05 9.19 9.37 9.82   HEMOGLOBIN g/dL 13.6 13.6 12.9* 13.3 14.9   HEMATOCRIT % 39.5 38.9 36.6* 38.6 42.9   PLATELETS 10*3/mm3 222 202 200 198 223     Results from last 7 days   Lab Units 03/10/19  0623 03/09/19  0650 03/08/19  0552 03/07/19  0559 03/06/19 2003   INR  2.76* 2.29* 1.80* 1.51* 1.56*       Culture Data:   No results found for: BLOODCX  No results found for: URINECX  No  results found for: RESPCX  No results found for: WOUNDCX  No results found for: STOOLCX  No components found for: BODYFLD    Radiology Data:   Imaging Results (last 24 hours)     ** No results found for the last 24 hours. **          I have reviewed the patient's current medications.     Assessment/Plan     Active Hospital Problems    Diagnosis   • **Acute UTI (urinary tract infection)   • Encephalopathy, metabolic   • Hyponatremia   • Type 2 diabetes mellitus (CMS/HCC)   • History of DVT (deep vein thrombosis)   • Essential hypertension   • Hypokalemia   • Sepsis secondary to UTI (CMS/HCC)   • Phimosis     Added automatically from request for surgery [id number]         Plan:    Omnicef day day 3/7  Diflucan day 3/14  Urology consultation appreciated  Almeida removed, patient is voiding  Hold Celexa  Psychiatry consultation appreciated  Prozac 10 mg daily  Cardiac monitoring, monitor QT  Replace potassium by protocol  Pharmacy to dose coumadin  PT/OT  Discharge planning: possible discharge tomorrow if QT remains stable to improved        This document has been electronically signed by KRISTINA Stack on March 10, 2019 10:59 AM

## 2019-03-10 NOTE — PROGRESS NOTES
LOS: 3 days     Patient Care Team:  Sony Alvarado MD as PCP - General      Subjective     Postcircumcision bleed urinary retention hematuria    Objective       Vital Signs  Temp:  [97.7 °F (36.5 °C)-98.7 °F (37.1 °C)] 98.2 °F (36.8 °C)  Heart Rate:  [74-95] 89  Resp:  [18-20] 20  BP: (126-156)/(76-85) 156/76    Physical Exam:        General Appearance:   Less confused     Respiratory:    UNLABORED RESPIRATIONS.     Abdomen:     SOFT.       Genitourinary:  [place] out urine clear no additional bleeding from penis     Rectal:     DEFERRED       Results Review:       Imaging Results (last 24 hours)     ** No results found for the last 24 hours. **        Lab Results (last 24 hours)     Procedure Component Value Units Date/Time    POC Glucose Once [[id number]]  (Normal) Collected:  03/10/19 0724    Specimen:  Blood Updated:  03/10/19 0802     Glucose 100 mg/dL      Comment: RN NotifiedOperator: [id number] ACEVEDO LIZANDROCORDELLamelia ID: [id number]       Protime-INR [[id number]]  (Abnormal) Collected:  03/10/19 0623    Specimen:  Blood Updated:  03/10/19 0707     Protime 27.9 Seconds      INR 2.76    Narrative:       Therapeutic range for most indications is 2.0-3.0 INR,  or 2.5-3.5 for mechanical heart valves.    Basic Metabolic Panel [[id number]]  (Abnormal) Collected:  03/10/19 0623    Specimen:  Blood Updated:  03/10/19 0651     Glucose 102 mg/dL      BUN 5 mg/dL      Creatinine 0.97 mg/dL      Sodium 134 mmol/L      Potassium 3.6 mmol/L      Chloride 105 mmol/L      CO2 22.0 mmol/L      Calcium 8.2 mg/dL      eGFR Non African Amer 74 mL/min/1.73      BUN/Creatinine Ratio 5.2     Anion Gap 7.0 mmol/L     Narrative:       The MDRD GFR formula is only valid for adults with stable renal function between ages 18 and 70.    CBC & Differential [[id number]] Collected:  03/10/19 0623    Specimen:  Blood Updated:  03/10/19 0639    Narrative:       The following orders were created for panel order CBC &  Differential.  Procedure                               Abnormality         Status                     ---------                               -----------         ------                     CBC Auto Differential[[id number]]        Abnormal            Final result                 Please view results for these tests on the individual orders.    CBC Auto Differential [[id number]]  (Abnormal) Collected:  03/10/19 0623    Specimen:  Blood Updated:  03/10/19 0639     WBC 8.53 10*3/mm3      RBC 4.22 10*6/mm3      Hemoglobin 13.6 g/dL      Hematocrit 39.5 %      MCV 93.6 fL      MCH 32.2 pg      MCHC 34.4 g/dL      RDW 14.5 %      RDW-SD 50.2 fl      MPV 9.3 fL      Platelets 222 10*3/mm3      Neutrophil % 68.8 %      Lymphocyte % 18.1 %      Monocyte % 8.4 %      Eosinophil % 3.8 %      Basophil % 0.7 %      Immature Grans % 0.2 %      Neutrophils, Absolute 5.87 10*3/mm3      Lymphocytes, Absolute 1.54 10*3/mm3      Monocytes, Absolute 0.72 10*3/mm3      Eosinophils, Absolute 0.32 10*3/mm3      Basophils, Absolute 0.06 10*3/mm3      Immature Grans, Absolute 0.02 10*3/mm3      nRBC 0.0 /100 WBC     Blood Culture - Blood, Arm, Right [[id number]] Collected:  03/06/19 2019    Specimen:  Blood from Arm, Right Updated:  03/09/19 2100     Blood Culture No growth at 3 days    Blood Culture - Blood, Hand, Right [[id number]] Collected:  03/06/19 2027    Specimen:  Blood from Hand, Right Updated:  03/09/19 2100     Blood Culture No growth at 3 days    POC Glucose Once [[id number]]  (Normal) Collected:  03/09/19 1916    Specimen:  Blood Updated:  03/09/19 1953     Glucose 116 mg/dL      Comment: RN NotifiedOperator: [id number] MARY ÁLVAREZDyllan ID: [id number]       Potassium [[id number]]  (Normal) Collected:  03/09/19 1759    Specimen:  Blood Updated:  03/09/19 1817     Potassium 3.9 mmol/L     POC Glucose Once [[id number]]  (Abnormal) Collected:  03/09/19 1653    Specimen:  Blood Updated:  03/09/19 1733     Glucose 173 mg/dL       Comment: RN NotifiedOperator: [id number] CURTIS Badillo ID: [id number]       Procalcitonin [[id number]]  (Abnormal) Collected:  03/06/19 2307    Specimen:  Blood Updated:  03/09/19 1409     Procalcitonin 0.11 ng/mL      Comment: A procalcitonin (PCT) level above 2.0 ng/mL on the first  day of ICU admission is associated with a high risk for  progression to severe sepsis and/or septic shock.  A PCT level below 0.5 ng/mL on the first day of ICU  admission is associated with a low risk for progression  to severe sepsis and/or septic shock.  Note: Concentrations <0.5 ng/mL do not exclude an  infection, on account of localized infections (without  systemic signs) which can be associated with such low  concentrations, or a systemic infection in its initial  stages (<6 hours).  Furthermore, increased procalcitonin can occur without  infection. PCT concentrations between 0.5 and 2.0 ng/mL  should be interpreted taking into account the patient's  history. It is recommended to retest PCT within 6-24 hours  if any concentrations <2 ng/mL are obtained.       Narrative:       Performed at:  [address] [place] [address]  [id number]  : Barrett Soliman MD, Phone:  [phone]    POC Glucose Once [[id number]]  (Abnormal) Collected:  03/09/19 1049    Specimen:  Blood Updated:  03/09/19 1122     Glucose 157 mg/dL      Comment: RN NotifiedOperator: [id number] CURTIS Badillo ID: [id number]               I reviewed the patient's new clinical results.  I reviewed the patient's new imaging results and agree with the interpretation.  I reviewed the patient's other test results and agree with the interpretation        Assessment/Plan       Acute UTI (urinary tract infection)    Phimosis    Sepsis secondary to UTI (CMS/HCC)    Encephalopathy, metabolic    Hyponatremia    Type 2 diabetes mellitus (CMS/HCC)    History of DVT (deep vein thrombosis)    Essential hypertension     Hypokalemia      Discharged from our standpoint soon      Raza Zayas MD  03/10/19  10:18 AM

## 2019-03-10 NOTE — PROGRESS NOTES
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    Goal INR: 2-3  Home dose is 1 mg daily    Today's INR:   Lab Results   Component Value Date    INR 2.76 (H) 03/10/2019         Lab Results   Component Value Date    INR 2.76 (H) 03/10/2019    INR 2.29 (H) 03/09/2019    INR 1.80 (H) 03/08/2019    PROTIME 27.9 (H) 03/10/2019    PROTIME 24.2 (H) 03/09/2019    PROTIME 20.2 (H) 03/08/2019     Lab Results   Component Value Date    HGB 13.6 03/10/2019    HGB 13.6 03/09/2019    HGB 12.9 (L) 03/08/2019     Lab Results   Component Value Date    HCT 39.5 03/10/2019    HCT 38.9 03/09/2019    HCT 36.6 (L) 03/08/2019     Lab Results   Component Value Date     03/10/2019     03/09/2019     03/08/2019       Recent Warfarin Administrations       The 5 most recent administrations since 03/03/2019 are shown below each listed medication.    Warfarin Sodium         Order Dose Date Given     warfarin (COUMADIN) half tablet 1.5 mg 1.5 mg 03/09/2019      1.5 mg 03/08/2019      1.5 mg 03/07/2019     warfarin (COUMADIN) tablet 1 mg 1 mg 03/07/2019                      Assessment/Plan:  Reviewed above labs. INR is 2.76.  INR is  therapeutic but did increase towards higher end of goal range quickly.. Pt did receive dose of warfarin last night. No changes in medication list. Concurrent medications include fluconazole and aspirin. Will decrease current dose of warfarin to  0.5 mg. Rx will continue to follow and adjust dose accordingly.      Heather Quesada, [facility]  03/10/19 1:45 PM     "

## 2019-03-11 VITALS
DIASTOLIC BLOOD PRESSURE: 76 MMHG | HEART RATE: 86 BPM | BODY MASS INDEX: 23.46 KG/M2 | RESPIRATION RATE: 20 BRPM | SYSTOLIC BLOOD PRESSURE: 146 MMHG | OXYGEN SATURATION: 97 % | WEIGHT: 154.8 LBS | TEMPERATURE: 97.8 F | HEIGHT: 68 IN

## 2019-03-11 LAB
ALBUMIN SERPL-MCNC: 3.1 G/DL (ref 3.4–4.8)
ALBUMIN/GLOB SERPL: 1 G/DL (ref 1.1–1.8)
ALP SERPL-CCNC: 77 U/L (ref 38–126)
ALT SERPL W P-5'-P-CCNC: 15 U/L (ref 21–72)
ANION GAP SERPL CALCULATED.3IONS-SCNC: 8 MMOL/L (ref 5–15)
AST SERPL-CCNC: 25 U/L (ref 17–59)
BACTERIA SPEC AEROBE CULT: NORMAL
BACTERIA SPEC AEROBE CULT: NORMAL
BASOPHILS # BLD AUTO: 0.08 10*3/MM3 (ref 0–0.2)
BASOPHILS NFR BLD AUTO: 1 % (ref 0–1.5)
BILIRUB SERPL-MCNC: 0.5 MG/DL (ref 0.2–1.3)
BUN BLD-MCNC: 6 MG/DL (ref 7–21)
BUN/CREAT SERPL: 5.8 (ref 7–25)
CALCIUM SPEC-SCNC: 8.3 MG/DL (ref 8.4–10.2)
CHLORIDE SERPL-SCNC: 104 MMOL/L (ref 95–110)
CO2 SERPL-SCNC: 24 MMOL/L (ref 22–31)
CREAT BLD-MCNC: 1.03 MG/DL (ref 0.7–1.3)
DEPRECATED RDW RBC AUTO: 50.7 FL (ref 37–54)
EOSINOPHIL # BLD AUTO: 0.29 10*3/MM3 (ref 0–0.4)
EOSINOPHIL NFR BLD AUTO: 3.7 % (ref 0.3–6.2)
ERYTHROCYTE [DISTWIDTH] IN BLOOD BY AUTOMATED COUNT: 14.6 % (ref 12.3–15.4)
GFR SERPL CREATININE-BSD FRML MDRD: 69 ML/MIN/1.73 (ref 42–98)
GLOBULIN UR ELPH-MCNC: 3 GM/DL (ref 2.3–3.5)
GLUCOSE BLD-MCNC: 99 MG/DL (ref 60–100)
GLUCOSE BLDC GLUCOMTR-MCNC: 105 MG/DL (ref 70–130)
GLUCOSE BLDC GLUCOMTR-MCNC: 106 MG/DL (ref 70–130)
GLUCOSE BLDC GLUCOMTR-MCNC: 99 MG/DL (ref 70–130)
HCT VFR BLD AUTO: 40 % (ref 37.5–51)
HGB BLD-MCNC: 13.4 G/DL (ref 13–17.7)
HOLD SPECIMEN: NORMAL
IMM GRANULOCYTES # BLD AUTO: 0.02 10*3/MM3 (ref 0–0.05)
IMM GRANULOCYTES NFR BLD AUTO: 0.3 % (ref 0–0.5)
INR PPP: 2.95 (ref 0.8–1.2)
LYMPHOCYTES # BLD AUTO: 1.63 10*3/MM3 (ref 0.7–3.1)
LYMPHOCYTES NFR BLD AUTO: 20.8 % (ref 19.6–45.3)
MCH RBC QN AUTO: 31.7 PG (ref 26.6–33)
MCHC RBC AUTO-ENTMCNC: 33.5 G/DL (ref 31.5–35.7)
MCV RBC AUTO: 94.6 FL (ref 79–97)
MONOCYTES # BLD AUTO: 0.67 10*3/MM3 (ref 0.1–0.9)
MONOCYTES NFR BLD AUTO: 8.6 % (ref 5–12)
NEUTROPHILS # BLD AUTO: 5.13 10*3/MM3 (ref 1.4–7)
NEUTROPHILS NFR BLD AUTO: 65.6 % (ref 42.7–76)
NRBC BLD AUTO-RTO: 0 /100 WBC (ref 0–0)
PLATELET # BLD AUTO: 227 10*3/MM3 (ref 140–450)
PMV BLD AUTO: 9.4 FL (ref 6–12)
POTASSIUM BLD-SCNC: 4.3 MMOL/L (ref 3.5–5.1)
POTASSIUM BLD-SCNC: 4.3 MMOL/L (ref 3.5–5.1)
PROT SERPL-MCNC: 6.1 G/DL (ref 6.3–8.6)
PROTHROMBIN TIME: 29.3 SECONDS (ref 11.1–15.3)
RBC # BLD AUTO: 4.23 10*6/MM3 (ref 4.14–5.8)
SODIUM BLD-SCNC: 136 MMOL/L (ref 137–145)
WBC NRBC COR # BLD: 7.82 10*3/MM3 (ref 3.4–10.8)

## 2019-03-11 PROCEDURE — 85610 PROTHROMBIN TIME: CPT | Performed by: INTERNAL MEDICINE

## 2019-03-11 PROCEDURE — 93005 ELECTROCARDIOGRAM TRACING: CPT | Performed by: NURSE PRACTITIONER

## 2019-03-11 PROCEDURE — 85025 COMPLETE CBC W/AUTO DIFF WBC: CPT | Performed by: NURSE PRACTITIONER

## 2019-03-11 PROCEDURE — 97530 THERAPEUTIC ACTIVITIES: CPT

## 2019-03-11 PROCEDURE — 63710000001 DIPHENHYDRAMINE PER 50 MG: Performed by: INTERNAL MEDICINE

## 2019-03-11 PROCEDURE — 93010 ELECTROCARDIOGRAM REPORT: CPT | Performed by: INTERNAL MEDICINE

## 2019-03-11 PROCEDURE — 82962 GLUCOSE BLOOD TEST: CPT

## 2019-03-11 PROCEDURE — 94799 UNLISTED PULMONARY SVC/PX: CPT

## 2019-03-11 PROCEDURE — 97166 OT EVAL MOD COMPLEX 45 MIN: CPT

## 2019-03-11 PROCEDURE — 94760 N-INVAS EAR/PLS OXIMETRY 1: CPT

## 2019-03-11 PROCEDURE — 84132 ASSAY OF SERUM POTASSIUM: CPT | Performed by: INTERNAL MEDICINE

## 2019-03-11 PROCEDURE — 80053 COMPREHEN METABOLIC PANEL: CPT | Performed by: NURSE PRACTITIONER

## 2019-03-11 RX ORDER — CEFDINIR 300 MG/1
300 CAPSULE ORAL EVERY 12 HOURS SCHEDULED
Qty: 7 CAPSULE | Refills: 0 | Status: SHIPPED | OUTPATIENT
Start: 2019-03-11 | End: 2019-03-15

## 2019-03-11 RX ORDER — TAMSULOSIN HYDROCHLORIDE 0.4 MG/1
0.4 CAPSULE ORAL NIGHTLY
Qty: 30 CAPSULE | Refills: 0 | Status: SHIPPED | OUTPATIENT
Start: 2019-03-11

## 2019-03-11 RX ORDER — NYSTATIN 100000 [USP'U]/G
POWDER TOPICAL EVERY 12 HOURS SCHEDULED
Qty: 15 G | Refills: 0 | Status: SHIPPED | OUTPATIENT
Start: 2019-03-11 | End: 2020-06-20

## 2019-03-11 RX ORDER — FLUCONAZOLE 200 MG/1
200 TABLET ORAL DAILY
Qty: 10 TABLET | Refills: 0 | Status: SHIPPED | OUTPATIENT
Start: 2019-03-12 | End: 2019-03-22

## 2019-03-11 RX ORDER — DIPHENHYDRAMINE HCL 25 MG
25 CAPSULE ORAL ONCE
Status: COMPLETED | OUTPATIENT
Start: 2019-03-11 | End: 2019-03-11

## 2019-03-11 RX ADMIN — POTASSIUM CHLORIDE 40 MEQ: 750 CAPSULE, EXTENDED RELEASE ORAL at 03:11

## 2019-03-11 RX ADMIN — NYSTATIN AND TRIAMCINOLONE ACETONIDE: 100000; 1 CREAM TOPICAL at 08:50

## 2019-03-11 RX ADMIN — DIPHENHYDRAMINE HYDROCHLORIDE 25 MG: 25 CAPSULE ORAL at 02:48

## 2019-03-11 RX ADMIN — SODIUM CHLORIDE 100 ML/HR: 900 INJECTION, SOLUTION INTRAVENOUS at 08:42

## 2019-03-11 RX ADMIN — ATORVASTATIN CALCIUM 10 MG: 10 TABLET, FILM COATED ORAL at 08:46

## 2019-03-11 RX ADMIN — NYSTATIN: 100000 POWDER TOPICAL at 08:50

## 2019-03-11 RX ADMIN — FAMOTIDINE 40 MG: 40 TABLET ORAL at 08:47

## 2019-03-11 RX ADMIN — ASPIRIN 81 MG: 81 TABLET, COATED ORAL at 08:47

## 2019-03-11 RX ADMIN — IPRATROPIUM BROMIDE AND ALBUTEROL SULFATE 3 ML: 2.5; .5 SOLUTION RESPIRATORY (INHALATION) at 07:26

## 2019-03-11 RX ADMIN — CEFDINIR 300 MG: 300 CAPSULE ORAL at 08:47

## 2019-03-11 RX ADMIN — SILVER SULFADIAZINE: 10 CREAM TOPICAL at 08:50

## 2019-03-11 RX ADMIN — FLUCONAZOLE 200 MG: 200 TABLET ORAL at 08:47

## 2019-03-11 RX ADMIN — DOCUSATE SODIUM 100 MG: 100 CAPSULE, LIQUID FILLED ORAL at 08:47

## 2019-03-11 NOTE — PLAN OF CARE
Problem: Patient Care Overview  Goal: Plan of Care Review  Outcome: Ongoing (interventions implemented as appropriate)   03/11/19 6247   Coping/Psychosocial   Plan of Care Reviewed With patient   Plan of Care Review   Progress no change   OTHER   Outcome Summary pt.continues to need Mod Assist for bed mobility and transfers. pt. was confused and only able to follow commands about 50-75% of the time

## 2019-03-11 NOTE — PLAN OF CARE
Problem: Patient Care Overview  Goal: Plan of Care Review  Outcome: Ongoing (interventions implemented as appropriate)   03/11/19 0215   Coping/Psychosocial   Plan of Care Reviewed With patient   Plan of Care Review   Progress no change   OTHER   Outcome Summary pt vs stable and is resting comfortably at this time. no new events throughout the night. will continue to monitor.     Goal: Individualization and Mutuality  Outcome: Ongoing (interventions implemented as appropriate)    Goal: Discharge Needs Assessment  Outcome: Ongoing (interventions implemented as appropriate)    Goal: Interprofessional Rounds/Family Conf  Outcome: Ongoing (interventions implemented as appropriate)      Problem: Skin Injury Risk (Adult)  Goal: Skin Health and Integrity  Outcome: Ongoing (interventions implemented as appropriate)      Problem: Urinary Tract Infection (Adult)  Goal: Signs and Symptoms of Listed Potential Problems Will be Absent, Minimized or Managed (Urinary Tract Infection)  Outcome: Ongoing (interventions implemented as appropriate)      Problem: Pain, Acute (Adult)  Goal: Acceptable Pain Control/Comfort Level  Outcome: Ongoing (interventions implemented as appropriate)      Problem: Fluid Volume Deficit (Adult)  Goal: Identify Related Risk Factors and Signs and Symptoms  Outcome: Ongoing (interventions implemented as appropriate)    Goal: Optimal Fluid Balance  Outcome: Ongoing (interventions implemented as appropriate)      Problem: Confusion, Acute (Adult)  Goal: Identify Related Risk Factors and Signs and Symptoms  Outcome: Ongoing (interventions implemented as appropriate)    Goal: Cognitive/Functional Impairments Minimized  Outcome: Ongoing (interventions implemented as appropriate)    Goal: Safety  Outcome: Ongoing (interventions implemented as appropriate)

## 2019-03-11 NOTE — PLAN OF CARE
Problem: Patient Care Overview  Goal: Plan of Care Review  Outcome: Unable to achieve outcome(s) by discharge Date Met: 03/11/19 03/11/19 1006   Coping/Psychosocial   Plan of Care Reviewed With patient   OTHER   Outcome Summary OT carrie completed this date, Pt was average of mod assist for sup to sit to sup, pt min assist for sitting balance on EOB, requested to get supine. Pt max assist to don and [facility] gown, pt total assist for pericare and changing mat pads after incontinent episode. Pt could benefit from further skilled OT to reach PLOF/max independence with ADL. Recommend skilled 24/7 care and further OT and PT services at d/c. Pt has already been d/c from hospital therefore no goals written.

## 2019-03-11 NOTE — THERAPY DISCHARGE NOTE
Acute Care - Occupational Therapy Initial Eval/Discharge  [facility]     Patient Name: Arnoldo Guzman  : 1934  MRN: [id number]  Today's Date: 3/11/2019  Onset of Illness/Injury or Date of Surgery: 19  Date of Referral to OT: 19  Referring Physician: Rey Rain APRMAYA      Admit Date: 3/6/2019       ICD-10-CM ICD-9-CM   1. Sepsis secondary to UTI (CMS/HCC) A41.9 038.9    N39.0 995.91     599.0   2. Impaired physical mobility Z74.09 781.99   3. Impaired mobility and ADLs Z74.09 799.89     Patient Active Problem List   Diagnosis   • Phimosis   • Sepsis secondary to UTI (CMS/HCC)   • Acute UTI (urinary tract infection)   • Encephalopathy, metabolic   • Hyponatremia   • Type 2 diabetes mellitus (CMS/HCC)   • History of DVT (deep vein thrombosis)   • Essential hypertension   • Hypokalemia     Past Medical History:   Diagnosis Date   • Anxiety    • Asthma    • Backache     Lumbarsacral radiculopathy      • Cataract    • Cerebrovascular accident (CMS/HCC)     R thalamic w L side residual hemiparesis      • Cerebrovascular disease    • Cervical spondylosis     C5-6,C6-C7 with traction spurs at L2and L3   • Chronic sinusitis    • Coal workers' pneumoconiosis (CMS/HCC)    • COPD (chronic obstructive pulmonary disease) (CMS/HCC)    • Deep venous thrombosis of lower extremity (CMS/HCC)     RECURRENT   • Degeneration of lumbar intervertebral disc    • Depression    • Diabetes mellitus (CMS/HCC)     Hypoglycemic state in diabetes      • Dysphagia    • Dyspnea    • Edema of leg    • Hypertension    • Hypokalemia    • Infarction of lung due to iatrogenic pulmonary embolism (CMS/HCC)     BILATERAL   • Muscle weakness     RESIDUAL LEFT-SIDED   • Neck pain    • On anticoagulant therapy    • Pain in elbow    • Pseudophakia    • Psoriasis    • S/P insertion of IVC (inferior vena caval) filter 2010    Geoff pul emboli   • Urinary incontinence      Past Surgical History:   Procedure Laterality Date   •  CIRCUMCISION     • ENDOSCOPY  01/19/2001    Marked strictured region with mucosa at the gastroesophageal junction. 530.3   • EYE SURGERY Right 03/24/1999    CATARACT REMOVAL W/LENS IMPLANT   • PHALLOPLASTY, CIRCUMCISION N/A 2/27/2019    Procedure: CIRCUMCISION;  Surgeon: Chana, Raza KEARNS MD;  Location: [facility];  Service: Urology          OT ASSESSMENT FLOWSHEET (last 72 hours)      Occupational Therapy Evaluation     Row Name 03/11/19 1006                   OT Evaluation Time/Intention    Subjective Information  no complaints  -        Document Type  evaluation  -        Mode of Treatment  individual therapy;occupational therapy  -        Total Evaluation Minutes, Occupational Therapy  38  -        Patient Effort  adequate  -        Comment  pt confused with decreased direction following and engagement.   -           General Information    Patient Profile Reviewed?  yes  -        Onset of Illness/Injury or Date of Surgery  03/06/19  -        Referring Physician  Rey ACEVEDO  -        Patient Observations  lethargic  -        Patient/Family Observations  no family present;   -        General Observations of Patient  pt supine HOB up on room air, IV, bed alarm   -        Prior Level of Function  -- unsure of accuracy; pt unable to voice  -        Equipment Currently Used at Home  -- unsure; pt unable to accurately voice  -        Existing Precautions/Restrictions  fall  -        Limitations/Impairments  safety/cognitive  -        Risks Reviewed  patient:  -        Benefits Reviewed  patient:  -        Barriers to Rehab  medically complex;previous functional deficit;cognitive status  -           Relationship/Environment    Lives With  spouse;grandchild(ana paula)  -           Resource/Environmental Concerns    Current Living Arrangements  home/apartment/condo  -           Cognitive Assessment/Intervention- PT/OT    Affect/Mental Status (Cognitive)  confused;flat/blunted  affect  -        Orientation Status (Cognition)  oriented to;person will respond to name   -        Follows Commands (Cognition)  follows one step commands;50-74% accuracy;75-90% accuracy;verbal cues/prompting required;repetition of directions required;physical/tactile prompts required  -        Safety Deficit (Cognitive)  mild deficit;moderate deficit  -        Personal Safety Interventions  fall prevention program maintained;supervised activity  -           Safety Issues, Functional Mobility    Safety Issues Affecting Function (Mobility)  sequencing abilities;safety precautions follow-through/compliance;safety precaution awareness;problem solving;judgment;insight into deficits/self awareness;impulsivity;awareness of need for assistance;at risk behavior observed;ability to follow commands  -        Impairments Affecting Function (Mobility)  balance;cognition;coordination;endurance/activity tolerance;strength;postural/trunk control;range of motion (ROM);motor control;motor planning  -           Bed Mobility Assessment/Treatment    Bed Mobility Assessment/Treatment  supine-sit;sit-supine;rolling left;rolling right  -        Rolling Left [place] (Bed Mobility)  minimum assist (75% patient effort);moderate assist (50% patient effort)  -        Rolling Right [place] (Bed Mobility)  minimum assist (75% patient effort);moderate assist (50% patient effort)  -        Supine-Sit [place] (Bed Mobility)  moderate assist (50% patient effort) max assist with trunk; did better with legs  -        Sit-Supine [place] (Bed Mobility)  minimum assist (75% patient effort)  -        Comment (Bed Mobility)  pt requried repositoning to get cleaned up after incontinent episode. pt assist. pt able to use RUE and RLE to help scoot up towards the HOB, at times max assist of 2 to get up towards HOB, other times pt did with max assist of 1   -           Functional Mobility    Functional Mobility-  Comment  defer  -           Transfer Assessment/Treatment    Comment (Transfers)  defer, pt sait EOB for approx 5 minutes and requested to get back into the bed.   -           ADL Assessment/Intervention    BADL Assessment/Intervention  grooming;feeding;upper body dressing;toileting  -           Upper Body Dressing Assessment/Training    Upper Body Dressing [place] Level  doff;don;maximum assist (25% patient effort) hospital gown   -        Upper Body Dressing Position  supine  -           Grooming Assessment/Training    Comment (Grooming)  pt set up and mod assist to wash off his face   -           Self-Feeding Assessment/Training    Comment (Feeding)  pt verbalized he was hungry offered a snack but pt declined options wanting to wait until lunch   -           Toileting Assessment/Training    [place] Level (Toileting)  dependent (less than 25% patient effort)  -        Comment (Toileting)  incontinent episode, required total mat pad changing and gown changing.   -           BADL Safety/Performance    Impairments, [facility] Safety/Performance  balance;endurance/activity tolerance;cognition;coordination;grasp/prehension;motor control;motor planning;range of motion;strength;trunk/postural control  -           General ROM    GENERAL ROM COMMENTS  RUE WFL  LUE limited range, increased tone, unable to move elbow into extension but about approx 20 degrees, wrist is down in flexion unable to get to neutral, digits in flexed positon fisted, able to extend distal parts PORM unable to fully possible contractures at knucles. Pt shoulder in supine approx 15-20 degrees active movement; tone increased unable to get PROM past approx 30 degrees.   -           MMT (Manual Muscle Testing)    General MMT Comments  RUE  and UE grossly 3+ to 4-/5; LUE increased tone decreased motion, impaired   -           Motor Assessment/Interventions    Additional Documentation  Balance (Group)  -            Balance    Balance  static sitting balance  -           Static Sitting Balance    Level of [place] (Unsupported Sitting, Static Balance)  minimal assist, 75% patient effort  -           Light Touch Sensation Assessment    Comment, Left Upper Extremity: Light Touch Sensation Assessment  impaired unable to accuractly assess   -        Comment, Right Upper Extremity: Light Touch Sensation Assessment  unable to accuractly assess   -           Positioning and Restraints    Pre-Treatment Position  in bed  -        Post Treatment Position  bed  -        In Bed  supine;call light within reach;encouraged to call for assist;exit alarm on;side rails up x2  -BH           Pain Scale: Numbers Pre/Post-Treatment    Pain Scale: Numbers, Pretreatment  0/10 - no pain  -        Pain Scale: Numbers, Post-Treatment  0/10 - no pain  -           Wound 02/27/19 1827 penis surgical;incision    Wound - Properties Group Date first assessed: 02/27/19  - Time first assessed: 1827  - Present On Admission : no  -CH Location: penis  -CH Type: surgical;incision  -       Plan of Care Review    Plan of Care Reviewed With  patient  -           Clinical Impression (OT)    Date of Referral to OT  03/07/19  -        OT Diagnosis  Impaired mobility and ADL   -        Prognosis (OT Eval)  guarded  -        Functional Level at Time of Evaluation (OT Eval)  pt decreased ROM, strength, endurance, safety, coordination, balance and independence with ADL, t/f and mobility  -        Criteria for Skilled Therapeutic Interventions Met (OT Eval)  yes;treatment indicated  -        Rehab Potential (OT Eval)  fair, will monitor progress closely  -        Therapy Frequency (OT Eval)  other (see comments) 5-7 days a week   -        Predicted Duration of Therapy Intervention (Therapy Eval)  until d/c   -        Care Plan Review (OT)  evaluation/treatment results reviewed  -        Anticipated Discharge Disposition (OT)  home  with 24/7 care;home with home health;skilled nursing facility  -           Vital Signs    Pre Systolic BP Rehab  -- OT attempted, pt arm tremor unable to get   -        Pretreatment Heart Rate (beats/min)  68  -BH        Posttreatment Heart Rate (beats/min)  71  -BH        Pre SpO2 (%)  98  -BH        O2 Delivery Pre Treatment  room air  -BH        Post SpO2 (%)  98  -BH        O2 Delivery Post Treatment  room air  -BH        Pre Patient Position  Supine  -BH        Post Patient Position  Supine  -BH           Planned OT Interventions    Planned Therapy Interventions (OT Eval)  activity tolerance training;adaptive equipment training;BADL retraining;cognitive/visual perception retraining;functional balance retraining;neuromuscular control/coordination retraining;occupation/activity based interventions;passive ROM/stretching;patient/caregiver education/training;ROM/therapeutic exercise;strengthening exercise;transfer/mobility retraining  -          User Key  (r) = Recorded By, (t) = Taken By, (c) = Cosigned By    Initials Name Effective Dates     Stefanie Cabrera, OTR/L 06/08/18 -     Kyara Mcclendon RN 06/23/17 -               OT Recommendation and Plan  Outcome Summary/Treatment Plan (OT)  Anticipated Discharge Disposition (OT): home with 24/7 care, home with home health, skilled nursing facility  Planned Therapy Interventions (OT Eval): activity tolerance training, adaptive equipment training, BADL retraining, cognitive/visual perception retraining, functional balance retraining, neuromuscular control/coordination retraining, occupation/activity based interventions, passive ROM/stretching, patient/caregiver education/training, ROM/therapeutic exercise, strengthening exercise, transfer/mobility retraining  Therapy Frequency (OT Eval): other (see comments)(5-7 days a week )  Plan of Care Review  Plan of Care Reviewed With: patient  Plan of Care Reviewed With: patient  Outcome Summary: OT eval  completed this date, Pt was average of mod assist for sup to sit to sup, pt min assist for sitting balance on EOB, requested to get supine. Pt max assist to don and doff hospital gown, pt total assist for pericare and changing mat pads after incontinent episode. Pt could benefit from further skilled OT to reach PLOF/max independence with ADL. Recommend skilled 24/7 care and further OT and PT services at d/c. Pt has already been d/c from hospital therefore no goals written.      Rehab Goal Summary     Row Name 03/11/19 8058             Physical Therapy Goals    Bed Mobility Goal Selection (PT)  bed mobility, PT goal 1  -LF      Transfer Goal Selection (PT)  transfer, PT goal 1  -LF      Gait Training Goal Selection (PT)  gait training, PT goal 1  -LF         Bed Mobility Goal 1 (PT)    Activity/Assistive Device (Bed Mobility Goal 1, PT)  sit to supine;supine to sit  -LF      [place] Level/Cues Needed (Bed Mobility Goal 1, PT)  supervision required  -LF      Time Frame (Bed Mobility Goal 1, PT)  3 days  -LF      Progress/Outcomes (Bed Mobility Goal 1, PT)  goal not met;goal ongoing  -LF         Transfer Goal 1 (PT)    Activity/Assistive Device (Transfer Goal 1, PT)  sit-to-stand/stand-to-sit;bed-to-chair/chair-to-bed;wheelchair transfer  -LF      [place] Level/Cues Needed (Transfer Goal 1, PT)  contact guard assist  -LF      Time Frame (Transfer Goal 1, PT)  2 - 3 days  -LF      Progress/Outcome (Transfer Goal 1, PT)  goal not met;goal ongoing  -LF         Gait Training Goal 1 (PT)    Activity/Assistive Device (Gait Training Goal 1, PT)  gait (walking locomotion);assistive device use;decrease fall risk;increase endurance/gait distance;walker, trey  -LF      [place] Level (Gait Training Goal 1, PT)  contact guard assist  -LF      Distance (Gait Goal 1, PT)  5  -LF      Time Frame (Gait Training Goal 1, PT)  2 - 3 days  -LF      Progress/Outcome (Gait Training Goal 1, PT)  goal not met;goal ongoing  -LF          Patient Education Goal (PT)    Activity (Patient Education Goal, PT)  HEP  -LF      [place]/Cues/Accuracy (Memory Goal 2, PT)  demonstrates adequately;verbalizes understanding  -LF      Time Frame (Patient Education Goal, PT)  3 days  -LF      Progress/Outcome (Patient Education Goal, PT)  goal not met;goal ongoing  -LF        User Key  (r) = Recorded By, (t) = Taken By, (c) = Cosigned By    Initials Name Provider Type Discipline    LF Lesvia Reveles, PT Physical Therapist PT          Outcome Measures     Row Name 03/11/19 1006 03/11/19 0834 03/09/19 1300       How much help from another person do you currently need...    Turning from your back to your side while in flat bed without using bedrails?  --  2  -CA  2  -TA    Moving from lying on back to sitting on the side of a flat bed without bedrails?  --  2  -CA  2  -TA    Moving to and from a bed to a chair (including a wheelchair)?  --  2  -CA  2  -TA    Standing up from a chair using your arms (e.g., wheelchair, bedside chair)?  --  2  -CA  2  -TA    Climbing 3-5 steps with a railing?  --  1  -CA  1  -TA    To walk in hospital room?  --  2  -CA  2  -TA    AM-PAC 6 Clicks Score  --  11  -CA  11  -TA       How much help from another is currently needed...    Putting on and taking off regular lower body clothing?  1  -BH  --  --    Bathing (including washing, rinsing, and drying)  2  -BH  --  --    Toileting (which includes using toilet bed pan or urinal)  1  -BH  --  --    Putting on and taking off regular upper body clothing  2  -BH  --  --    Taking care of personal grooming (such as brushing teeth)  2  -BH  --  --    Eating meals  2  -BH  --  --    Score  10  -BH  --  --       Functional Assessment    Outcome Measure Options  AM-PAC 6 Clicks Daily Activity (OT)  -  AM-PAC 6 Clicks Basic Mobility (PT)  -CA  AM-PAC 6 Clicks Basic Mobility (PT)  -TA      User Key  (r) = Recorded By, (t) = Taken By, (c) = Cosigned By    Initials Name Provider Type      Stefanie Cabrera OTR/L Occupational Therapist    Penelope Blanco, PTA Physical Therapy Assistant    David Corona, PTA Physical Therapy Assistant          Time Calculation:   Time Calculation- OT     Row Name 03/11/19 1511             Time Calculation-     OT Start Time  1006  -      OT Stop Time  1044  -      OT Time Calculation (min)  38 min  -      OT Received On  03/04/19  -        User Key  (r) = Recorded By, (t) = Taken By, (c) = Cosigned By    Initials Name Provider Type     Stefanie Cabrera, OTR/L Occupational Therapist        Therapy Suggested Charges     Code   Minutes Charges    None           Therapy Charges for Today     Code Description Service Date Service Provider Modifiers Qty    [id number] HC OT EVAL MOD COMPLEXITY 2 3/11/2019 Stefanie Cabrera OTR/L GO 1               OT Discharge Summary  Anticipated Discharge Disposition (OT): home with 24/7 care, home with home health, skilled nursing facility  Reason for Discharge: Discharge from facility, Per MD order  Outcomes Achieved: Discharge from facility occurred on same date as evluation  Discharge Destination: Home    GO [place]/ABE  3/11/2019

## 2019-03-11 NOTE — PLAN OF CARE
Problem: Patient Care Overview  Goal: Plan of Care Review  Outcome: Ongoing (interventions implemented as appropriate)      Problem: Skin Injury Risk (Adult)  Goal: Skin Health and Integrity  Outcome: Ongoing (interventions implemented as appropriate)      Problem: Pain, Acute (Adult)  Goal: Acceptable Pain Control/Comfort Level  Outcome: Ongoing (interventions implemented as appropriate)      Problem: Fluid Volume Deficit (Adult)  Goal: Optimal Fluid Balance  Outcome: Ongoing (interventions implemented as appropriate)      Problem: Confusion, Acute (Adult)  Goal: Cognitive/Functional Impairments Minimized  Outcome: Ongoing (interventions implemented as appropriate)    Goal: Safety  Outcome: Ongoing (interventions implemented as appropriate)

## 2019-03-11 NOTE — THERAPY TREATMENT NOTE
Acute Care - Physical Therapy Treatment Note  [facility]     Patient Name: Arnoldo Guzman  : 1934  MRN: [id number]  Today's Date: 3/11/2019  Onset of Illness/Injury or Date of Surgery: 19  Date of Referral to PT: 19  Referring Physician: Rey ACEVEDO    Admit Date: 3/6/2019    Visit Dx:    ICD-10-CM ICD-9-CM   1. Sepsis secondary to UTI (CMS/[facility]) A41.9 038.9    N39.0 995.91     599.0   2. Impaired physical mobility Z74.09 781.99     Patient Active Problem List   Diagnosis   • Phimosis   • Sepsis secondary to UTI (CMS/[facility])   • Acute UTI (urinary tract infection)   • Encephalopathy, metabolic   • Hyponatremia   • Type 2 diabetes mellitus (CMS/[facility])   • History of DVT (deep vein thrombosis)   • Essential hypertension   • Hypokalemia       Therapy Treatment    Rehabilitation Treatment Summary     Row Name 1934             Treatment Time/Intention    Discipline  physical therapy assistant  -CA      Document Type  therapy note (daily note)  -CA      Subjective Information  no complaints  -CA      Mode of Treatment  individual therapy;physical therapy  -CA      Patient Effort  adequate  -CA2      Existing Precautions/Restrictions  fall  -CA2      Recorded by [CA] David Bermudez, PTA 19 0845  [CA2] David Bermudez, PTA 19 09      Row Name 19 0834             Vital Signs    Pre Patient Position  Supine  -CA      Intra Patient Position  Sitting  -CA2      Post Patient Position  Supine  -CA2      Recorded by [CA] David Bermudez, PTA 19 0902  [CA2] David Bermudez, PTA 19 09      Row Name 19 0834             Cognitive Assessment/Intervention- PT/OT    Affect/Mental Status (Cognitive)  confused;flat/blunted affect  -CA      Orientation Status (Cognition)  oriented to;person  -CA      Follows Commands (Cognition)  follows one step commands  -CA      Safety Deficit (Cognitive)  mild deficit  -CA      Personal Safety Interventions  fall prevention program  maintained;gait belt;nonskid shoes/slippers when out of bed  -CA2      Recorded by [CA] David Bermudez, PTA 03/11/19 0902  [CA2] David Bermudez, PTA 03/11/19 0921      Row Name 03/11/19 0834             Bed Mobility Assessment/Treatment    Bed Mobility Assessment/Treatment  supine-sit;sit-supine  -CA      Supine-Sit [place] (Bed Mobility)  moderate assist (50% patient effort)  -CA      Sit-Supine [place] (Bed Mobility)  minimum assist (75% patient effort)  -CA      Recorded by [CA] David Bermudez, PTA 03/11/19 0921      Row Name 03/11/19 0834             Transfer Assessment/Treatment    Transfer Assessment/Treatment  sit-stand transfer;stand-sit transfer  -CA      Recorded by [CA] David Bermudez, PTA 03/11/19 0921      Row Name 03/11/19 0834             Sit-Stand Transfer    Sit-Stand [place] (Transfers)  moderate assist (50% patient effort)  -CA      Assistive Device (Sit-Stand Transfers)  other (see comments) HHA  -CA      Recorded by [CA] David Bermudez, PTA 03/11/19 0921      Row Name 03/11/19 0834             Stand-Sit Transfer    Stand-Sit [place] (Transfers)  moderate assist (50% patient effort)  -CA      Assistive Device (Stand-Sit Transfers)  other (see comments) HHA  -CA      Recorded by [CA] David Bermudez, PTA 03/11/19 0921      Row Name 03/11/19 0834             Therapeutic Exercise    Therapeutic Exercise  seated, lower extremities  -CA      Recorded by [CA] David Bermudez, PTA 03/11/19 0921      Row Name 03/11/19 0834             Lower Extremity Seated Therapeutic Exercise    Performed, Seated Lower Extremity (Therapeutic Exercise)  LAQ (long arc quad), knee extension  -CA      Exercise Type, Seated Lower Extremity (Therapeutic Exercise)  AROM (active range of motion);AAROM (active assistive range of motion)  -CA      Sets/Reps Detail, Seated Lower Extremity (Therapeutic Exercise)  5x1  -CA      Comment, Seated Lower Extremity (Therapeutic Exercise)  pt. only able to completed with RLE   -CA      Recorded by [CA] David Bermudez, PTA 03/11/19 0921      Row Name 03/11/19 0834             Positioning and Restraints    Pre-Treatment Position  in bed  -CA      Post Treatment Position  bed  -CA      In Bed  supine;call light within reach;encouraged to call for assist;exit alarm on  -CA      Recorded by [CA] David Bermudez, PTA 03/11/19 0921      Row Name 03/11/19 0834             Pain Scale: Numbers Pre/Post-Treatment    Pain Scale: Numbers, Pretreatment  0/10 - no pain  -CA      Pain Scale: Numbers, Post-Treatment  0/10 - no pain  -CA      Recorded by [CA] David Bermudez, PTA 03/11/19 0921      Row Name                Wound 02/27/19 1827 penis surgical;incision    Wound - Properties Group Date first assessed: 02/27/19 [CH] Time first assessed: 1827 [CH] Present On Admission : no [CH] Location: penis [CH] Type: surgical;incision [CH] Recorded by:  [CH] Kyara Quiros RN 02/27/19 1827      User Key  (r) = Recorded By, (t) = Taken By, (c) = Cosigned By    Initials Name Effective Dates Discipline    CA David Bermudez, PTA 03/07/18 -  PT    CH Kyara Quiros RN 06/23/17 -  Nurse          Wound 02/27/19 1827 penis surgical;incision (Active)   Dressing Appearance open to air 3/10/2019  8:23 PM   Closure None 3/10/2019  8:23 PM   Base red/granulating;scab 3/10/2019  8:23 PM   Care, Wound barrier applied 3/10/2019  8:23 PM           Physical Therapy Education     Title: PT OT SLP Therapies (In Progress)     Topic: Physical Therapy (In Progress)     Point: Mobility training (In Progress)     Learning Progress Summary           Patient Acceptance, E,D, NR by CB at 3/8/2019  1:03 PM                   Point: Precautions (In Progress)     Learning Progress Summary           Patient Acceptance, E,D, NR by CB at 3/8/2019  1:03 PM                               User Key     Initials Effective Dates Name Provider Type Discipline    CB 04/06/17 -  Amanda Mccain, PT Physical Therapist PT                PT  Recommendation and Plan     Plan of Care Reviewed With: patient  Progress: no change  Outcome Summary: pt.continues to need Mod Assist for bed mobility and transfers.  pt. was confused and only able to follow commands about 50-75% of the time  Outcome Measures     Row Name 03/11/19 0834 03/09/19 1300 03/08/19 0944       How much help from another person do you currently need...    Turning from your back to your side while in flat bed without using bedrails?  2  -CA  2  -TA  2  -CB    Moving from lying on back to sitting on the side of a flat bed without bedrails?  2  -CA  2  -TA  2  -CB    Moving to and from a bed to a chair (including a wheelchair)?  2  -CA  2  -TA  2  -CB    Standing up from a chair using your arms (e.g., wheelchair, bedside chair)?  2  -CA  2  -TA  2  -CB    Climbing 3-5 steps with a railing?  1  -CA  1  -TA  1  -CB    To walk in hospital room?  2  -CA  2  -TA  2  -CB    AM-PAC 6 Clicks Score  11  -CA  11  -TA  11  -CB       Functional Assessment    Outcome Measure Options  AM-PAC 6 Clicks Basic Mobility (PT)  -CA  AM-PAC 6 Clicks Basic Mobility (PT)  -TA  AM-PAC 6 Clicks Basic Mobility (PT)  -CB      User Key  (r) = Recorded By, (t) = Taken By, (c) = Cosigned By    Initials Name Provider Type    CB Amanda Mccain, PT Physical Therapist    Penelope Blanco, PTA Physical Therapy Assistant    David Corona PTA Physical Therapy Assistant         Time Calculation:   PT Charges     Row Name 03/11/19 0916             Time Calculation    Start Time  0834  -CA      Stop Time  0900  -CA      Time Calculation (min)  26 min  -CA      PT Goal Re-Cert Due Date  03/11/19  -CA         Time Calculation- PT    Total Timed Code Minutes- PT  26 minute(s)  -CA        User Key  (r) = Recorded By, (t) = Taken By, (c) = Cosigned By    Initials Name Provider Type    David Corona, CHELSI Physical Therapy Assistant        Therapy Suggested Charges     Code   Minutes Charges    None           Therapy Charges for  Today     Code Description Service Date Service Provider Modifiers Qty    16313691094 HC PT THERAPEUTIC ACT EA 15 MIN 3/11/2019 David Bermudez, PTA GP 2          PT G-Codes  Outcome Measure Options: AM-PAC 6 Clicks Basic Mobility (PT)  AM-PAC 6 Clicks Score: 11    David Bermudez PTA  3/11/2019

## 2019-03-11 NOTE — DISCHARGE SUMMARY
[facility] Medicine Services  DISCHARGE SUMMARY       Date of Admission: 3/6/2019  Date of Discharge:  3/11/2019  Primary Care Physician: Sony Alvarado MD    Presenting Problem/History of Present Illness:  Sepsis secondary to UTI (CMS/HCC) [A41.9, N39.0]  Sepsis secondary to UTI (CMS/HCC) [A41.9, N39.0]     Final Discharge Diagnoses:    Acute UTI (urinary tract infection)    Phimosis    Sepsis secondary to UTI (CMS/HCC)    Encephalopathy, metabolic    Hyponatremia    Type 2 diabetes mellitus (CMS/HCC)    History of DVT (deep vein thrombosis)    Essential hypertension    Hypokalemia      Consults:   Consults     Date and Time Order Name Status Description    3/6/2019 2250 Inpatient Urology Consult Completed           Pertinent Test Results:   Ct Head Without Contrast    Result Date: 3/7/2019  Exam: Head CT without contrast. INDICATION: Confusion state TECHNIQUE: Routine head CT without contrast. Sagittal coronal reconstructions were obtained. This exam was performed according to our departmental dose-optimization program, which includes automated exposure control, adjustment of the mA and/or kV according to patient size and/or use of iterative reconstruction technique. COMPARISON: 10/23/2015 FINDINGS: The bony calvarium is intact. Mild mucosal thickening is ethmoid and frontal sinuses. There is a small amount of fluid in the sphenoid sinuses. Mastoid air cells are clear. No extra-axial fluid collection. Enlargement of the ventricles and sulci compatible with mild generalized cerebral atrophy. Gray-white differentiation is maintained. There is attenuation white matter consistent moderate ischemic changes. 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 No intraparenchymal hemorrhage, mass or midline shift.     No obvious acute intracranial abnormality. Recommend follow-up as clinically warranted. Electronically  signed by:  Uri Ward MD  3/7/2019 12:32 AM CST Workstation: [id number]    Us Scrotum & Testicles    Result Date: 3/7/2019  PROCEDURE: US SCROTUM AND TESTICLES (accession [id number]), US TESTICULAR OR OVARIAN VASCULAR LIMITED (accession [id number]) Clinical History: swelling, A41.9 Sepsis, unspecified organism N39.0 Urinary tract infection, site not specified Indication: Same as above. Comparison: None . Technique: Grayscale and color Doppler evaluation of the testes and the scrotum was done Findings: The right testes measures 2.8 x 2.3 x 2.6  centimeters and the left testes measures 2.4 x 1.9 x 2.7  centimeters. The bilateral testes do not  show any evidence of infiltrative or focal mass lesions. The bilateral testes show normal blood flow. The bilateral epididymides are not well-visualized. There is presence of small bilateral hydroceles There are no varicoceles     Impression:  Unremarkable bilateral testes. The bilateral epididymides are not well-visualized. There is presence of small bilateral hydroceles   Electronically signed by:  Brian Carvajal MD  3/7/2019 2:15 PM CST Workstation: [id number]    Xr Chest 1 View    Result Date: 3/7/2019  PROCEDURE: XR CHEST 1 VIEW HISTORY: sepsis, A41.9 Sepsis, unspecified organism N39.0 Urinary tract infection, site not specified COMPARISON: 10/23/2015 TECHNIQUE: Single projection of the chest was done. FINDINGS: An artifact obscures the left CP angle . Stable parenchymal scarring is seen in the left upper lung zone, unchanged since 10/23/2015 There are no discrete airspace infiltrates, pneumothoraces or pleural effusions in the evaluated bilateral lung fields. The pulmonary vascularity is normal. The cardiomediastinal silhouette is stable.     There is no acute pleural-parenchymal process seen in the imaged lung fields. Stable parenchymal scarring is seen in the left upper lung zone, unchanged since 10/23/2015 Electronically signed by:  Brian Carvajal MD   3/7/2019 7:15 AM CST Workstation: [id number]    Us Testicular Or Ovarian Vascular Limited    Result Date: 3/7/2019  PROCEDURE: US SCROTUM AND TESTICLES (accession [id number]), US TESTICULAR OR OVARIAN VASCULAR LIMITED (accession [id number]) Clinical History: swelling, A41.9 Sepsis, unspecified organism N39.0 Urinary tract infection, site not specified Indication: Same as above. Comparison: None . Technique: Grayscale and color Doppler evaluation of the testes and the scrotum was done Findings: The right testes measures 2.8 x 2.3 x 2.6  centimeters and the left testes measures 2.4 x 1.9 x 2.7  centimeters. The bilateral testes do not  show any evidence of infiltrative or focal mass lesions. The bilateral testes show normal blood flow. The bilateral epididymides are not well-visualized. There is presence of small bilateral hydroceles There are no varicoceles     Impression:  Unremarkable bilateral testes. The bilateral epididymides are not well-visualized. There is presence of small bilateral hydroceles   Electronically signed by:  Brian Carvajal MD  3/7/2019 2:15 PM CST Workstation: [id number]      [facility]/Hospital Course:  The patient is a 84 y.o. 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  UA was consistent with UTI. He was admitted on broad spectrum antibiotics and his confusion improved. Physical examination demonstrated penile erythema. Scrotal ultrasound was unremarkable.  Urology consulted and recommended antibiotics, mycolog and silvadene creams, and voiding trial. Cultures grew candida and he has been de-escalated to diflucan and omnicef.   He is chronically on celexa for depression.  EKG revealed prolonged QT.  Psychiatry was consulted and recommended starting prozac for to minimize serotonin withdrawal as it also has a  "lesser degree of QT prolongation effects.  Serial EKGs reveal a normalized QT interval.  The patient has not demonstrated further crying episodes.  The patient is recommended to complete his antibiotics and follow-up with behavioral health outpatient in two weeks to reassess the need for Celexa vs another SSRI.  The patient's cline has been removed and he is voiding.  The patient was cleared by urology for discharge. He is stable and discharged to home.      Condition on Discharge:  Stable    Physical Exam on Discharge:  /76 (BP Location: Left arm, Patient Position: Lying)   Pulse 86   Temp 97.8 °F (36.6 °C) (Axillary)   Resp 20   Ht 172.7 cm (68\")   Wt 70.2 kg (154 lb 12.8 oz)   SpO2 97%   BMI 23.54 kg/m²   Physical Exam   Constitutional: He appears well-developed and well-nourished.   HENT:   Head: Normocephalic.   Eyes: Conjunctivae are normal.   Cardiovascular: Normal rate, regular rhythm, normal heart sounds and intact distal pulses.   Pulmonary/Chest: Effort normal and breath sounds normal. No respiratory distress.   Abdominal: Soft. Bowel sounds are normal. He exhibits no distension. There is no tenderness.   Musculoskeletal: Normal range of motion. He exhibits no edema or deformity.   Neurological: He is alert. He exhibits normal muscle tone.   Skin: Skin is warm and dry. He is not diaphoretic. No erythema.   Vitals reviewed.        Discharge Disposition:  Home or Self Care    Discharge Medications:     Discharge Medications      New Medications      Instructions Start Date   cefdinir 300 MG capsule  Commonly known as:  OMNICEF  Notes to patient:  03/11/2019   300 mg, Oral, Every 12 Hours Scheduled      fluconazole 200 MG tablet  Commonly known as:  DIFLUCAN  Notes to patient:  03/12/2019   200 mg, Oral, Daily      nystatin 690638 UNIT/GM powder  Commonly known as:  MYCOSTATIN  Notes to patient:  03/11/2019   Topical, Every 12 Hours Scheduled      nystatin-triamcinolone 169603-8.1 UNIT/GM-% " cream  Commonly known as:  MYCOLOG II  Notes to patient:  03/11/2019   Topical, Every 12 Hours Scheduled      silver sulfadiazine 1 % cream  Commonly known as:  SILVADENE, SSD  Notes to patient:  03/12/2019   Topical, Every 24 Hours Scheduled      tamsulosin 0.4 MG capsule 24 hr capsule  Commonly known as:  FLOMAX  Notes to patient:  03/11/2019   0.4 mg, Oral, Nightly         Continue These Medications      Instructions Start Date   aspirin 81 MG EC tablet  Notes to patient:  03/12/2019   81 mg, Oral, Daily      lovastatin 40 MG tablet  Commonly known as:  MEVACOR  Notes to patient:  03/11/2019   40 mg, Oral, Nightly      oxyCODONE-acetaminophen 7.5-325 MG per tablet  Commonly known as:  PERCOCET  Notes to patient:  As needed   1-2 tablets, Oral, Every 4 Hours PRN      warfarin 1 MG tablet  Commonly known as:  COUMADIN  Notes to patient:  03/11/2019   1 mg, Oral, Daily Warfarin         Stop These Medications    citalopram 40 MG tablet  Commonly known as:  CeleXA     lisinopril 20 MG tablet  Commonly known as:  PRINIVIL,ZESTRIL     LORTAB 10  MG per tablet  Generic drug:  Hydrocodone-Acetaminophen            Discharge Diet:   Diet Instructions     Diet: Soft Texture, Consistent Carbohydrate; Thin Liquids, No Restrictions; Ground      Discharge Diet:   Soft Texture  Consistent Carbohydrate       Fluid Consistency:  Thin Liquids, No Restrictions    Soft Options:  Ground          Activity at Discharge:   Activity Instructions     Activity as Tolerated              Follow-up Appointments:   1. PCP 1 week  2. Dr. Zayas as scheduled for post-operative care    Test Results Pending at Discharge:    Order Current Status    Blood Culture - Blood, Arm, Right Preliminary result    Blood Culture - Blood, Hand, Right Preliminary result          Rey Gallardo, APRN  03/11/19  3:24 PM    Time: Discharge planning and teaching took greater than 30 minutes.     PHYSICIAN ATTESTATION.  I attest that I agree with the  discharge summary as documented by Paulina ACEVEDO.

## 2019-03-11 NOTE — THERAPY DISCHARGE NOTE
Acute Care - Physical Therapy Discharge Summary  [facility]       Patient Name: Arnoldo Guzman  : 1934  MRN: [id number]    Today's Date: 3/11/2019  Onset of Illness/Injury or Date of Surgery: 19    Date of Referral to PT: 19  Referring Physician: Rey ACEVEDO      Admit Date: 3/6/2019      PT Recommendation and Plan    Visit Dx:    ICD-10-CM ICD-9-CM   1. Sepsis secondary to UTI (CMS/formerly [facility]) A41.9 038.9    N39.0 995.91     599.0   2. Impaired physical mobility Z74.09 781.99       Outcome Measures     Row Name 19 0834 19 1300          How much help from another person do you currently need...    Turning from your back to your side while in flat bed without using bedrails?  2  -CA  2  -TA     Moving from lying on back to sitting on the side of a flat bed without bedrails?  2  -CA  2  -TA     Moving to and from a bed to a chair (including a wheelchair)?  2  -CA  2  -TA     Standing up from a chair using your arms (e.g., wheelchair, bedside chair)?  2  -CA  2  -TA     Climbing 3-5 steps with a railing?  1  -CA  1  -TA     To walk in hospital room?  2  -CA  2  -TA     AM-PAC 6 Clicks Score  11  -CA  11  -TA        Functional Assessment    Outcome Measure Options  AM-PAC 6 Clicks Basic Mobility (PT)  -CA  AM-PAC 6 Clicks Basic Mobility (PT)  -TA       User Key  (r) = Recorded By, (t) = Taken By, (c) = Cosigned By    Initials Name Provider Type    Penelope Blanco PTA Physical Therapy Assistant    David Corona, CHELSI Physical Therapy Assistant          PT Charges     Row Name 19 0916             Time Calculation    Start Time  0834  -CA      Stop Time  09  -CA      Time Calculation (min)  26 min  -CA      PT Goal Re-Cert Due Date  19  -CA         Time Calculation- PT    Total Timed Code Minutes- PT  26 minute(s)  -CA        User Key  (r) = Recorded By, (t) = Taken By, (c) = Cosigned By    Initials Name Provider Type    David Corona, CHELSI Physical Therapy  Assistant        Therapy Suggested Charges     Code   Minutes Charges    None             Rehab Goal Summary     Row Name 03/11/19 9471             Physical Therapy Goals    Bed Mobility Goal Selection (PT)  bed mobility, PT goal 1  -LF      Transfer Goal Selection (PT)  transfer, PT goal 1  -LF      Gait Training Goal Selection (PT)  gait training, PT goal 1  -LF         Bed Mobility Goal 1 (PT)    Activity/Assistive Device (Bed Mobility Goal 1, PT)  sit to supine;supine to sit  -LF      [place] Level/Cues Needed (Bed Mobility Goal 1, PT)  supervision required  -LF      Time Frame (Bed Mobility Goal 1, PT)  3 days  -LF      Progress/Outcomes (Bed Mobility Goal 1, PT)  goal not met;goal ongoing  -LF         Transfer Goal 1 (PT)    Activity/Assistive Device (Transfer Goal 1, PT)  sit-to-stand/stand-to-sit;bed-to-chair/chair-to-bed;wheelchair transfer  -LF      [place] Level/Cues Needed (Transfer Goal 1, PT)  contact guard assist  -LF      Time Frame (Transfer Goal 1, PT)  2 - 3 days  -LF      Progress/Outcome (Transfer Goal 1, PT)  goal not met;goal ongoing  -LF         Gait Training Goal 1 (PT)    Activity/Assistive Device (Gait Training Goal 1, PT)  gait (walking locomotion);assistive device use;decrease fall risk;increase endurance/gait distance;walker, trey  -LF      [place] Level (Gait Training Goal 1, PT)  contact guard assist  -LF      Distance (Gait Goal 1, PT)  5  -LF      Time Frame (Gait Training Goal 1, PT)  2 - 3 days  -LF      Progress/Outcome (Gait Training Goal 1, PT)  goal not met;goal ongoing  -LF         Patient Education Goal (PT)    Activity (Patient Education Goal, PT)  HEP  -LF      [place]/Cues/Accuracy (Memory Goal 2, PT)  demonstrates adequately;verbalizes understanding  -LF      Time Frame (Patient Education Goal, PT)  3 days  -LF      Progress/Outcome (Patient Education Goal, PT)  goal not met;goal ongoing  -LF        User Key  (r) = Recorded By, (t) = Taken By, (c) =  Cosigned By    Initials Name Provider Type Discipline    Lesvia Darling, PT Physical Therapist PT              PT Discharge Summary  Anticipated Discharge Disposition (PT): home with home health, skilled nursing facility  Reason for Discharge: Discharge from facility, Per MD order  Outcomes Achieved: Unable to make functional progress toward goals at this time, Refer to plan of care for updates on goals achieved  Discharge Destination: Home      Lesvia Reveles, KIM   3/11/2019

## 2019-03-12 ENCOUNTER — READMISSION MANAGEMENT (OUTPATIENT)
Dept: CALL CENTER | Facility: HOSPITAL | Age: 84
End: 2019-03-12

## 2019-03-12 NOTE — OUTREACH NOTE
Prep Survey      Responses   Facility patient discharged from?  [facility]   Is patient eligible?  Yes   Discharge diagnosis  sepsis/UTI   Does the patient have one of the following disease processes/diagnoses(primary or secondary)?  Sepsis   Does the patient have Home health ordered?  No   Is there a DME ordered?  No   What DME was ordered?  has home equipment   Comments regarding appointments  [facility] for Outpatient services   General alerts for this patient  physical impairment,  chronically ill,  cognitively impaired   Prep survey completed?  Yes          Kiera Boland RN

## 2019-03-14 ENCOUNTER — READMISSION MANAGEMENT (OUTPATIENT)
Dept: CALL CENTER | Facility: HOSPITAL | Age: 84
End: 2019-03-14

## 2019-03-14 NOTE — OUTREACH NOTE
Sepsis Week 1 Survey      Responses   Facility patient discharged from?  [facility]   Does the patient have one of the following disease processes/diagnoses(primary or secondary)?  Sepsis   Is there a successful TCM telephone encounter documented?  No   Week 1 attempt successful?  Yes   Call start time  0936   Call end time  0944   Is patient permission given to speak with other caregiver?  Yes   Person spoke with today (if not patient) and relationship  Naz - spouse   Meds reviewed with patient/caregiver?  Yes   Is the patient having any side effects they believe may be caused by any medication additions or changes?  No   Does the patient have all medications related to this admission filled (includes all antibiotics, inhalers, nebulizers,steroids,etc.)  Yes   Is the patient taking all medications as directed (includes completed medication regime)?  Yes   Does the patient have a primary care provider?   Yes   Does the patient have an appointment with their PCP within 7 days of discharge?  Yes   Has the patient kept scheduled appointments due by today?  N/A   Comments  Appt with Sony Alvarado 3/19/19   Psychosocial issues?  No   Did the patient receive a copy of their discharge instructions?  Yes   Nursing interventions  Reviewed instructions with patient   What is the patient's perception of their health status since discharge?  Improving   Nursing interventions  Nurse provided patient education   Is the patient/caregiver able to teach back Sepsis?  S - Shivering,fever or very cold, E - Extreme pain or generalized discomfort (worst ever,especially abdomen), P - Pale or discolored skin, S - Short of breath, S - Sleepy, difficult to arouse,confused   Nursing interventions  Nurse provided patient education   Is patient/caregiver able to teach back steps to recovery at home?  Rest and regain strength, Set small, achievable goals for return to baseline health, Eat a balanced diet, Exercise as tolerated, Make a  list of questions for PCP appoinment   Is the patient/caregiver able to teach back signs and symptoms of worsening condition:  Fever, Hyperthermia, Shortness of breath/rapid respiratory rate, Altered mental status(confusion/coma), Edema, Rapid heart rate (>90)   Is the patient/caregiver able to teach back the hierarchy of who to call/visit for symptoms/problems? PCP, Specialist, Home health nurse, Urgent Care, ED, 911  Yes   Week 1 call completed?  Yes          Marzena Gutierrez RN

## 2019-03-21 ENCOUNTER — READMISSION MANAGEMENT (OUTPATIENT)
Dept: CALL CENTER | Facility: HOSPITAL | Age: 84
End: 2019-03-21

## 2019-03-21 NOTE — OUTREACH NOTE
"Sepsis Week 2 Survey      Responses   Facility patient discharged from?  [facility]   Does the patient have one of the following disease processes/diagnoses(primary or secondary)?  Sepsis   Week 2 attempt successful?  Yes   Call start time  1542   Call end time  1545   General alerts for this patient  physical impairment,  chronically ill,  cognitively impaired   Person spoke with today (if not patient) and relationship  daughter   Meds reviewed with patient/caregiver?  Yes   Is the patient taking all medications as directed (includes completed medication regime)?  Yes   Has the patient kept scheduled appointments due by today?  Yes   What is the patient's perception of their health status since discharge?  Improving   Additional teach back comments  Daughter says pt is \"good\". Voiding without difficulity. Appetite good. Wound looks good   Week 2 call completed?  Yes          Richa Gaspar RN  "

## 2019-03-28 ENCOUNTER — READMISSION MANAGEMENT (OUTPATIENT)
Dept: CALL CENTER | Facility: HOSPITAL | Age: 84
End: 2019-03-28

## 2019-03-28 NOTE — OUTREACH NOTE
Sepsis Week 3 Survey      Responses   Facility patient discharged from?  [facility]   Does the patient have one of the following disease processes/diagnoses(primary or secondary)?  Sepsis   Week 3 attempt successful?  Yes   Call start time  1605   Call end time  1606   General alerts for this patient  physical impairment,  chronically ill,  cognitively impaired   Discharge diagnosis  sepsis/UTI   Meds reviewed with patient/caregiver?  Yes   Is the patient taking all medications as directed (includes completed medication regime)?  Yes   Comments regarding appointments  [facility] for Outpatient services   Has the patient kept scheduled appointments due by today?  Yes   Psychosocial issues?  No   What is the patient's perception of their health status since discharge?  Returned to baseline/stable   Is the patient/caregiver able to teach back Sepsis?  E - Extreme pain or generalized discomfort (worst ever,especially abdomen), S - Sleepy, difficult to arouse,confused   Is patient/caregiver able to teach back steps to recovery at home?  Talk about feelings with family/friends, Eat a balanced diet   Is the patient/caregiver able to teach back the hierarchy of who to call/visit for symptoms/problems? PCP, Specialist, Home health nurse, Urgent Care, ED, 911  Yes   Additional teach back comments  Pt doing very well.    Week 3 call completed?  Yes   Revoked  No further contact(revokes)-requires comment   Graduated/Revoked comments  doing very well          Dia Concepcion, ALEKSANDRA

## 2019-04-04 ENCOUNTER — HOSPITAL ENCOUNTER (EMERGENCY)
Facility: HOSPITAL | Age: 84
Discharge: HOME | End: 2019-04-04
Attending: FAMILY MEDICINE | Admitting: FAMILY MEDICINE

## 2019-04-04 VITALS
BODY MASS INDEX: 25.01 KG/M2 | RESPIRATION RATE: 20 BRPM | HEIGHT: 68 IN | SYSTOLIC BLOOD PRESSURE: 151 MMHG | TEMPERATURE: 98.2 F | WEIGHT: 165 LBS | HEART RATE: 84 BPM | OXYGEN SATURATION: 97 % | DIASTOLIC BLOOD PRESSURE: 90 MMHG

## 2019-04-04 DIAGNOSIS — E87.6 HYPOKALEMIA: Primary | ICD-10-CM

## 2019-04-04 LAB
ALBUMIN SERPL-MCNC: 3.4 G/DL (ref 3.5–5.2)
ALBUMIN/GLOB SERPL: 1.1 G/DL
ALP SERPL-CCNC: 109 U/L (ref 39–117)
ALT SERPL W P-5'-P-CCNC: 8 U/L (ref 1–41)
ANION GAP SERPL CALCULATED.3IONS-SCNC: 17 MMOL/L
AST SERPL-CCNC: 12 U/L (ref 1–40)
BASOPHILS # BLD AUTO: 0.06 10*3/MM3 (ref 0–0.2)
BASOPHILS NFR BLD AUTO: 0.8 % (ref 0–1.5)
BILIRUB SERPL-MCNC: 1 MG/DL (ref 0.2–1.2)
BILIRUB UR QL STRIP: NEGATIVE
BUN BLD-MCNC: 8 MG/DL (ref 8–23)
BUN/CREAT SERPL: 9.5 (ref 7–25)
CALCIUM SPEC-SCNC: 8.5 MG/DL (ref 8.6–10.5)
CHLORIDE SERPL-SCNC: 94 MMOL/L (ref 98–107)
CLARITY UR: CLEAR
CO2 SERPL-SCNC: 26 MMOL/L (ref 22–29)
COLOR UR: YELLOW
CREAT BLD-MCNC: 0.84 MG/DL (ref 0.76–1.27)
DEPRECATED RDW RBC AUTO: 43.8 FL (ref 37–54)
EOSINOPHIL # BLD AUTO: 0.1 10*3/MM3 (ref 0–0.4)
EOSINOPHIL NFR BLD AUTO: 1.3 % (ref 0.3–6.2)
ERYTHROCYTE [DISTWIDTH] IN BLOOD BY AUTOMATED COUNT: 13.3 % (ref 12.3–15.4)
GFR SERPL CREATININE-BSD FRML MDRD: 87 ML/MIN/1.73
GLOBULIN UR ELPH-MCNC: 3.2 GM/DL
GLUCOSE BLD-MCNC: 152 MG/DL (ref 65–99)
GLUCOSE UR STRIP-MCNC: ABNORMAL MG/DL
HCT VFR BLD AUTO: 41.2 % (ref 37.5–51)
HGB BLD-MCNC: 14.6 G/DL (ref 13–17.7)
HGB UR QL STRIP.AUTO: NEGATIVE
HOLD SPECIMEN: NORMAL
IMM GRANULOCYTES # BLD AUTO: 0.02 10*3/MM3 (ref 0–0.05)
IMM GRANULOCYTES NFR BLD AUTO: 0.3 % (ref 0–0.5)
INR PPP: 2.51 (ref 0.8–1.2)
KETONES UR QL STRIP: NEGATIVE
LEUKOCYTE ESTERASE UR QL STRIP.AUTO: NEGATIVE
LYMPHOCYTES # BLD AUTO: 2.56 10*3/MM3 (ref 0.7–3.1)
LYMPHOCYTES NFR BLD AUTO: 34.5 % (ref 19.6–45.3)
MCH RBC QN AUTO: 31.8 PG (ref 26.6–33)
MCHC RBC AUTO-ENTMCNC: 35.4 G/DL (ref 31.5–35.7)
MCV RBC AUTO: 89.8 FL (ref 79–97)
MONOCYTES # BLD AUTO: 0.56 10*3/MM3 (ref 0.1–0.9)
MONOCYTES NFR BLD AUTO: 7.6 % (ref 5–12)
NEUTROPHILS # BLD AUTO: 4.11 10*3/MM3 (ref 1.4–7)
NEUTROPHILS NFR BLD AUTO: 55.5 % (ref 42.7–76)
NITRITE UR QL STRIP: NEGATIVE
NRBC BLD AUTO-RTO: 0 /100 WBC (ref 0–0)
PH UR STRIP.AUTO: 6.5 [PH] (ref 5–9)
PLATELET # BLD AUTO: 207 10*3/MM3 (ref 140–450)
PMV BLD AUTO: 10.1 FL (ref 6–12)
POTASSIUM BLD-SCNC: 2.7 MMOL/L (ref 3.5–5.2)
PROT SERPL-MCNC: 6.6 G/DL (ref 6–8.5)
PROT UR QL STRIP: NEGATIVE
PROTHROMBIN TIME: 26 SECONDS (ref 11.1–15.3)
RBC # BLD AUTO: 4.59 10*6/MM3 (ref 4.14–5.8)
SODIUM BLD-SCNC: 137 MMOL/L (ref 136–145)
SP GR UR STRIP: 1.01 (ref 1–1.03)
UROBILINOGEN UR QL STRIP: ABNORMAL
WBC NRBC COR # BLD: 7.41 10*3/MM3 (ref 3.4–10.8)

## 2019-04-04 PROCEDURE — 85610 PROTHROMBIN TIME: CPT | Performed by: FAMILY MEDICINE

## 2019-04-04 PROCEDURE — 96365 THER/PROPH/DIAG IV INF INIT: CPT

## 2019-04-04 PROCEDURE — 99284 EMERGENCY DEPT VISIT MOD MDM: CPT

## 2019-04-04 PROCEDURE — 85025 COMPLETE CBC W/AUTO DIFF WBC: CPT | Performed by: FAMILY MEDICINE

## 2019-04-04 PROCEDURE — 25010000003 POTASSIUM CHLORIDE 10 MEQ/100ML SOLUTION: Performed by: FAMILY MEDICINE

## 2019-04-04 PROCEDURE — 80053 COMPREHEN METABOLIC PANEL: CPT | Performed by: FAMILY MEDICINE

## 2019-04-04 PROCEDURE — 81003 URINALYSIS AUTO W/O SCOPE: CPT | Performed by: FAMILY MEDICINE

## 2019-04-04 RX ORDER — POTASSIUM CHLORIDE 7.45 MG/ML
10 INJECTION INTRAVENOUS ONCE
Status: COMPLETED | OUTPATIENT
Start: 2019-04-04 | End: 2019-04-04

## 2019-04-04 RX ORDER — FLUOXETINE 10 MG/1
10 CAPSULE ORAL DAILY
COMMUNITY
End: 2020-06-20

## 2019-04-04 RX ORDER — SODIUM CHLORIDE 0.9 % (FLUSH) 0.9 %
10 SYRINGE (ML) INJECTION AS NEEDED
Status: DISCONTINUED | OUTPATIENT
Start: 2019-04-04 | End: 2019-04-04 | Stop reason: HOSPADM

## 2019-04-04 RX ORDER — POTASSIUM CHLORIDE 1.5 G/1.77G
20 POWDER, FOR SOLUTION ORAL ONCE
Status: COMPLETED | OUTPATIENT
Start: 2019-04-04 | End: 2019-04-04

## 2019-04-04 RX ORDER — POTASSIUM CHLORIDE 750 MG/1
20 CAPSULE, EXTENDED RELEASE ORAL ONCE
Status: COMPLETED | OUTPATIENT
Start: 2019-04-04 | End: 2019-04-04

## 2019-04-04 RX ADMIN — POTASSIUM CHLORIDE 10 MEQ: 7.46 INJECTION, SOLUTION INTRAVENOUS at 05:39

## 2019-04-04 RX ADMIN — POTASSIUM CHLORIDE 20 MEQ: 1.5 POWDER, FOR SOLUTION ORAL at 06:37

## 2019-04-04 RX ADMIN — POTASSIUM CHLORIDE 20 MEQ: 750 CAPSULE, EXTENDED RELEASE ORAL at 05:41

## 2019-04-04 RX ADMIN — SODIUM CHLORIDE 1000 ML: 9 INJECTION, SOLUTION INTRAVENOUS at 05:34

## 2019-04-04 NOTE — ED PROVIDER NOTES
Subjective   84-year-old white male presents the emergency department with a complaint of feeling something busted in his lower abdomen.  He states that he felt something pop and felt like something warm and came out in the bottom of his abdomen and then resolved.  He states that he feels good now.  His family member at the bedside states that he did get sweaty.            Review of Systems   Constitutional: Negative for chills, diaphoresis, fatigue and fever.   HENT: Negative for nosebleeds, sore throat, trouble swallowing and voice change.    Eyes: Negative for pain and visual disturbance.   Respiratory: Negative for shortness of breath.    Cardiovascular: Negative for chest pain and palpitations.   Gastrointestinal: Negative for vomiting.   Endocrine: Negative for polydipsia and polyuria.   Genitourinary: Negative for difficulty urinating and dysuria.   Musculoskeletal: Negative for arthralgias and back pain.   Skin: Negative for color change and rash.   Allergic/Immunologic: Negative for immunocompromised state.   Neurological: Negative for weakness and numbness.   Hematological: Negative for adenopathy.   Psychiatric/Behavioral: Negative for agitation and confusion.       Past Medical History:   Diagnosis Date   • Anxiety    • Asthma    • Backache     Lumbarsacral radiculopathy      • Cataract    • Cerebrovascular accident (CMS/HCC)     R thalamic w L side residual hemiparesis      • Cerebrovascular disease    • Cervical spondylosis     C5-6,C6-C7 with traction spurs at L2and L3   • Chronic sinusitis    • Coal workers' pneumoconiosis (CMS/HCC)    • COPD (chronic obstructive pulmonary disease) (CMS/HCC)    • Deep venous thrombosis of lower extremity (CMS/HCC)     RECURRENT   • Degeneration of lumbar intervertebral disc    • Depression    • Diabetes mellitus (CMS/HCC)     Hypoglycemic state in diabetes      • Dysphagia    • Dyspnea    • Edema of leg    • Hypertension    • Hypokalemia    • Infarction of lung due  to iatrogenic pulmonary embolism (CMS/HCC)     BILATERAL   • Muscle weakness     RESIDUAL LEFT-SIDED   • Neck pain    • On anticoagulant therapy    • Pain in elbow    • Pseudophakia    • Psoriasis    • S/P insertion of IVC (inferior vena caval) filter 01/29/2010    Geoff pul emboli   • Urinary incontinence        No Known Allergies    Past Surgical History:   Procedure Laterality Date   • CIRCUMCISION     • ENDOSCOPY  01/19/2001    Marked strictured region with mucosa at the gastroesophageal junction. 530.3   • EYE SURGERY Right 03/24/1999    CATARACT REMOVAL W/LENS IMPLANT   • PHALLOPLASTY, CIRCUMCISION N/A 2/27/2019    Procedure: CIRCUMCISION;  Surgeon: Chana, Raza KEARNS MD;  Location: [facility];  Service: Urology       Family History   Problem Relation Age of Onset   • Asthma Other    • Cancer Other    • Depression Other    • Diabetes Other    • Heart disease Other    • COPD Other    • Stroke Other    • Osteoarthritis Other    • Clotting disorder Other    • Seizures Other        Social History     Socioeconomic History   • Marital status:      Spouse name: Not on file   • Number of children: Not on file   • Years of education: Not on file   • Highest education level: Not on file   Tobacco Use   • Smoking status: Never Smoker   • Smokeless tobacco: Current User     Types: Chew   • Tobacco comment: DOES NOT CURRENTLY SMOKE   Substance and Sexual Activity   • Alcohol use: No   • Drug use: No   • Sexual activity: Defer           Objective   Physical Exam   Constitutional: He is oriented to person, place, and time. He appears well-developed and well-nourished. No distress.   HENT:   Head: Normocephalic and atraumatic.   Right Ear: External ear normal.   Left Ear: External ear normal.   Mouth/Throat: Oropharynx is clear and moist. No oropharyngeal exudate.   Eyes: Conjunctivae and EOM are normal. Pupils are equal, round, and reactive to light. Right eye exhibits no discharge. Left eye exhibits no discharge. No  scleral icterus.   Neck: Neck supple. No JVD present. No tracheal deviation present. No thyromegaly present.   Cardiovascular: Normal rate, regular rhythm, normal heart sounds and intact distal pulses. Exam reveals no friction rub.   No murmur heard.  Pulmonary/Chest: Effort normal and breath sounds normal. No stridor. No respiratory distress. He has no wheezes. He has no rales. He exhibits no tenderness.   Abdominal: Soft. Bowel sounds are normal. He exhibits no distension and no mass. There is no tenderness. There is no rebound and no guarding.   Musculoskeletal: He exhibits no edema, tenderness or deformity.   Lymphadenopathy:     He has no cervical adenopathy.   Neurological: He is alert and oriented to person, place, and time. No cranial nerve deficit. He exhibits normal muscle tone. Coordination normal.   Skin: Skin is warm and dry. Capillary refill takes 2 to 3 seconds. No rash noted. He is not diaphoretic. No erythema.   Psychiatric: He has a normal mood and affect. His behavior is normal. Judgment and thought content normal.   Nursing note and vitals reviewed.      Procedures           ED Course  ED Course as of Apr 04 0602   Thu Apr 04, 2019   0420 Patient was placed in room 9 and evaluated by me.  His physical exam was normal.  Labs were obtained to rule out a metabolic process.  Urinalysis was normal.  His potassium level was quite low he was given oral and IV potassium in the emergency department.  Case was discussed with Dr Nowak who felt that his K+ could be corrected orally and discharged.  He will follow-up with his primary care provider today or tomorrow for reevaluation.  [CE]      ED Course User Index  [CE] David Hernandes, DO        Labs Reviewed   PROTIME-INR - Abnormal; Notable for the following components:       Result Value    Protime 26.0 (*)     INR 2.51 (*)     All other components within normal limits    Narrative:     Therapeutic range for most indications is 2.0-3.0 INR,  or  2.5-3.5 for mechanical heart valves.   COMPREHENSIVE METABOLIC PANEL - Abnormal; Notable for the following components:    Glucose 152 (*)     Potassium 2.7 (*)     Chloride 94 (*)     Calcium 8.5 (*)     Albumin 3.40 (*)     All other components within normal limits    Narrative:     GFR Normal >60  Chronic Kidney Disease <60  Kidney Failure <15   URINALYSIS W/ MICROSCOPIC IF INDICATED (NO CULTURE) - Abnormal; Notable for the following components:    Glucose,  mg/dL (1+) (*)     All other components within normal limits    Narrative:     Urine microscopic not indicated.   CBC WITH AUTO DIFFERENTIAL - Normal   MAGNESIUM   POCT PROTIME - INR   CBC AND DIFFERENTIAL    Narrative:     The following orders were created for panel order CBC & Differential.  Procedure                               Abnormality         Status                     ---------                               -----------         ------                     CBC Auto Differential[[id number]]        Normal              Final result                 Please view results for these tests on the individual orders.   EXTRA TUBES    Narrative:     The following orders were created for panel order Extra Tubes.  Procedure                               Abnormality         Status                     ---------                               -----------         ------                     Gold Top - SST[[id number]]                                   Final result                 Please view results for these tests on the individual orders.   GOLD TOP - SST     Ct Head Without Contrast    Result Date: 3/7/2019  Narrative: Exam: Head CT without contrast. INDICATION: Confusion state TECHNIQUE: Routine head CT without contrast. Sagittal coronal reconstructions were obtained. This exam was performed according to our departmental dose-optimization program, which includes automated exposure control, adjustment of the mA and/or kV according to patient size and/or use of  iterative reconstruction technique. COMPARISON: 10/23/2015 FINDINGS: The bony calvarium is intact. Mild mucosal thickening is ethmoid and frontal sinuses. There is a small amount of fluid in the sphenoid sinuses. Mastoid air cells are clear. No extra-axial fluid collection. Enlargement of the ventricles and sulci compatible with mild generalized cerebral atrophy. Gray-white differentiation is maintained. There is attenuation white matter consistent moderate ischemic changes. 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 No intraparenchymal hemorrhage, mass or midline shift.     Impression: No obvious acute intracranial abnormality. Recommend follow-up as clinically warranted. Electronically signed by:  Uri Ward MD  3/7/2019 12:32 AM CST Workstation: [id number]    Us Scrotum & Testicles    Result Date: 3/7/2019  Narrative: PROCEDURE: US SCROTUM AND TESTICLES (accession [id number]), US TESTICULAR OR OVARIAN VASCULAR LIMITED (accession [id number]) Clinical History: swelling, A41.9 Sepsis, unspecified organism N39.0 Urinary tract infection, site not specified Indication: Same as above. Comparison: None . Technique: Grayscale and color Doppler evaluation of the testes and the scrotum was done Findings: The right testes measures 2.8 x 2.3 x 2.6  centimeters and the left testes measures 2.4 x 1.9 x 2.7  centimeters. The bilateral testes do not  show any evidence of infiltrative or focal mass lesions. The bilateral testes show normal blood flow. The bilateral epididymides are not well-visualized. There is presence of small bilateral hydroceles There are no varicoceles     Impression: Impression:  Unremarkable bilateral testes. The bilateral epididymides are not well-visualized. There is presence of small bilateral hydroceles   Electronically signed by:  Brian Carvajal MD  3/7/2019 2:15 PM CST Workstation: [id number]    Xr Chest 1 View    Result Date:  3/7/2019  Narrative: PROCEDURE: XR CHEST 1 VIEW HISTORY: sepsis, A41.9 Sepsis, unspecified organism N39.0 Urinary tract infection, site not specified COMPARISON: 10/23/2015 TECHNIQUE: Single projection of the chest was done. FINDINGS: An artifact obscures the left CP angle . Stable parenchymal scarring is seen in the left upper lung zone, unchanged since 10/23/2015 There are no discrete airspace infiltrates, pneumothoraces or pleural effusions in the evaluated bilateral lung fields. The pulmonary vascularity is normal. The cardiomediastinal silhouette is stable.     Impression: There is no acute pleural-parenchymal process seen in the imaged lung fields. Stable parenchymal scarring is seen in the left upper lung zone, unchanged since 10/23/2015 Electronically signed by:  Brian Carvajal MD  3/7/2019 7:15 AM CST Workstation: [id number]     Testicular Or Ovarian Vascular Limited    Result Date: 3/7/2019  Narrative: PROCEDURE: US SCROTUM AND TESTICLES (accession [id number]), US TESTICULAR OR OVARIAN VASCULAR LIMITED (accession [id number]) Clinical History: swelling, A41.9 Sepsis, unspecified organism N39.0 Urinary tract infection, site not specified Indication: Same as above. Comparison: None . Technique: Grayscale and color Doppler evaluation of the testes and the scrotum was done Findings: The right testes measures 2.8 x 2.3 x 2.6  centimeters and the left testes measures 2.4 x 1.9 x 2.7  centimeters. The bilateral testes do not  show any evidence of infiltrative or focal mass lesions. The bilateral testes show normal blood flow. The bilateral epididymides are not well-visualized. There is presence of small bilateral hydroceles There are no varicoceles     Impression: Impression:  Unremarkable bilateral testes. The bilateral epididymides are not well-visualized. There is presence of small bilateral hydroceles   Electronically signed by:  Brian Carvajal MD  3/7/2019 2:15 PM CST Workstation: [id number]             MDM      Final diagnoses:   Hypokalemia     Labs Reviewed   PROTIME-INR - Abnormal; Notable for the following components:       Result Value    Protime 26.0 (*)     INR 2.51 (*)     All other components within normal limits    Narrative:     Therapeutic range for most indications is 2.0-3.0 INR,  or 2.5-3.5 for mechanical heart valves.   COMPREHENSIVE METABOLIC PANEL - Abnormal; Notable for the following components:    Glucose 152 (*)     Potassium 2.7 (*)     Chloride 94 (*)     Calcium 8.5 (*)     Albumin 3.40 (*)     All other components within normal limits    Narrative:     GFR Normal >60  Chronic Kidney Disease <60  Kidney Failure <15   URINALYSIS W/ MICROSCOPIC IF INDICATED (NO CULTURE) - Abnormal; Notable for the following components:    Glucose,  mg/dL (1+) (*)     All other components within normal limits    Narrative:     Urine microscopic not indicated.   CBC WITH AUTO DIFFERENTIAL - Normal   MAGNESIUM   POCT PROTIME - INR   CBC AND DIFFERENTIAL    Narrative:     The following orders were created for panel order CBC & Differential.  Procedure                               Abnormality         Status                     ---------                               -----------         ------                     CBC Auto Differential[[id number]]        Normal              Final result                 Please view results for these tests on the individual orders.   EXTRA TUBES    Narrative:     The following orders were created for panel order Extra Tubes.  Procedure                               Abnormality         Status                     ---------                               -----------         ------                     Gold Top - SST[[id number]]                                   Final result                 Please view results for these tests on the individual orders.   GOLD TOP - SST     Ct Head Without Contrast    Result Date: 3/7/2019  Narrative: Exam: Head CT without contrast. INDICATION:  Confusion state TECHNIQUE: Routine head CT without contrast. Sagittal coronal reconstructions were obtained. This exam was performed according to our departmental dose-optimization program, which includes automated exposure control, adjustment of the mA and/or kV according to patient size and/or use of iterative reconstruction technique. COMPARISON: 10/23/2015 FINDINGS: The bony calvarium is intact. Mild mucosal thickening is ethmoid and frontal sinuses. There is a small amount of fluid in the sphenoid sinuses. Mastoid air cells are clear. No extra-axial fluid collection. Enlargement of the ventricles and sulci compatible with mild generalized cerebral atrophy. Gray-white differentiation is maintained. There is attenuation white matter consistent moderate ischemic changes. 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 No intraparenchymal hemorrhage, mass or midline shift.     Impression: No obvious acute intracranial abnormality. Recommend follow-up as clinically warranted. Electronically signed by:  Uri Ward MD  3/7/2019 12:32 AM CST Workstation: [id number]    Us Scrotum & Testicles    Result Date: 3/7/2019  Narrative: PROCEDURE: US SCROTUM AND TESTICLES (accession [id number]), US TESTICULAR OR OVARIAN VASCULAR LIMITED (accession [id number]) Clinical History: swelling, A41.9 Sepsis, unspecified organism N39.0 Urinary tract infection, site not specified Indication: Same as above. Comparison: None . Technique: Grayscale and color Doppler evaluation of the testes and the scrotum was done Findings: The right testes measures 2.8 x 2.3 x 2.6  centimeters and the left testes measures 2.4 x 1.9 x 2.7  centimeters. The bilateral testes do not  show any evidence of infiltrative or focal mass lesions. The bilateral testes show normal blood flow. The bilateral epididymides are not well-visualized. There is presence of small bilateral hydroceles There are no  varicoceles     Impression: Impression:  Unremarkable bilateral testes. The bilateral epididymides are not well-visualized. There is presence of small bilateral hydroceles   Electronically signed by:  Brian Carvajal MD  3/7/2019 2:15 PM CST Workstation: [id number]    Xr Chest 1 View    Result Date: 3/7/2019  Narrative: PROCEDURE: XR CHEST 1 VIEW HISTORY: sepsis, A41.9 Sepsis, unspecified organism N39.0 Urinary tract infection, site not specified COMPARISON: 10/23/2015 TECHNIQUE: Single projection of the chest was done. FINDINGS: An artifact obscures the left CP angle . Stable parenchymal scarring is seen in the left upper lung zone, unchanged since 10/23/2015 There are no discrete airspace infiltrates, pneumothoraces or pleural effusions in the evaluated bilateral lung fields. The pulmonary vascularity is normal. The cardiomediastinal silhouette is stable.     Impression: There is no acute pleural-parenchymal process seen in the imaged lung fields. Stable parenchymal scarring is seen in the left upper lung zone, unchanged since 10/23/2015 Electronically signed by:  Brian Carvajal MD  3/7/2019 7:15 AM CST Workstation: [id number]    Us Testicular Or Ovarian Vascular Limited    Result Date: 3/7/2019  Narrative: PROCEDURE: US SCROTUM AND TESTICLES (accession [id number]), US TESTICULAR OR OVARIAN VASCULAR LIMITED (accession [id number]) Clinical History: swelling, A41.9 Sepsis, unspecified organism N39.0 Urinary tract infection, site not specified Indication: Same as above. Comparison: None . Technique: Grayscale and color Doppler evaluation of the testes and the scrotum was done Findings: The right testes measures 2.8 x 2.3 x 2.6  centimeters and the left testes measures 2.4 x 1.9 x 2.7  centimeters. The bilateral testes do not  show any evidence of infiltrative or focal mass lesions. The bilateral testes show normal blood flow. The bilateral epididymides are not well-visualized. There is presence of small  bilateral hydroceles There are no varicoceles     Impression: Impression:  Unremarkable bilateral testes. The bilateral epididymides are not well-visualized. There is presence of small bilateral hydroceles   Electronically signed by:  Brian Carvajal MD  3/7/2019 2:15 PM [facility] Workstation: -[id number]         David Hernandes,   04/04/19 0532       David Hernandes, DO  04/04/19 0603

## 2019-06-27 ENCOUNTER — HOSPITAL ENCOUNTER (OUTPATIENT)
Facility: HOSPITAL | Age: 84
Setting detail: OBSERVATION
End: 2019-06-27
Attending: INTERNAL MEDICINE | Admitting: INTERNAL MEDICINE

## 2019-07-05 ENCOUNTER — ANESTHESIA (OUTPATIENT)
Dept: GASTROENTEROLOGY | Facility: HOSPITAL | Age: 84
End: 2019-07-05

## 2019-07-05 ENCOUNTER — HOSPITAL ENCOUNTER (OUTPATIENT)
Facility: HOSPITAL | Age: 84
Setting detail: HOSPITAL OUTPATIENT SURGERY
Discharge: HOME | End: 2019-07-05
Attending: INTERNAL MEDICINE | Admitting: INTERNAL MEDICINE

## 2019-07-05 ENCOUNTER — ANESTHESIA EVENT (OUTPATIENT)
Dept: GASTROENTEROLOGY | Facility: HOSPITAL | Age: 84
End: 2019-07-05

## 2019-07-05 VITALS
RESPIRATION RATE: 18 BRPM | DIASTOLIC BLOOD PRESSURE: 74 MMHG | OXYGEN SATURATION: 95 % | BODY MASS INDEX: 24.25 KG/M2 | SYSTOLIC BLOOD PRESSURE: 139 MMHG | WEIGHT: 160 LBS | HEART RATE: 73 BPM | HEIGHT: 68 IN | TEMPERATURE: 97.6 F

## 2019-07-05 DIAGNOSIS — K22.2 STRICTURE ESOPHAGUS: ICD-10-CM

## 2019-07-05 LAB — GLUCOSE BLDC GLUCOMTR-MCNC: 131 MG/DL (ref 70–130)

## 2019-07-05 PROCEDURE — 88305 TISSUE EXAM BY PATHOLOGIST: CPT | Performed by: PATHOLOGY

## 2019-07-05 PROCEDURE — 25010000002 PROPOFOL 10 MG/ML EMULSION: Performed by: NURSE ANESTHETIST, CERTIFIED REGISTERED

## 2019-07-05 PROCEDURE — 82962 GLUCOSE BLOOD TEST: CPT

## 2019-07-05 PROCEDURE — 88305 TISSUE EXAM BY PATHOLOGIST: CPT | Performed by: INTERNAL MEDICINE

## 2019-07-05 RX ORDER — DEXTROSE AND SODIUM CHLORIDE 5; .45 G/100ML; G/100ML
20 INJECTION, SOLUTION INTRAVENOUS CONTINUOUS
Status: DISCONTINUED | OUTPATIENT
Start: 2019-07-05 | End: 2019-07-05 | Stop reason: HOSPADM

## 2019-07-05 RX ORDER — DEXAMETHASONE SODIUM PHOSPHATE 4 MG/ML
8 INJECTION, SOLUTION INTRA-ARTICULAR; INTRALESIONAL; INTRAMUSCULAR; INTRAVENOUS; SOFT TISSUE ONCE AS NEEDED
Status: DISCONTINUED | OUTPATIENT
Start: 2019-07-05 | End: 2019-07-05 | Stop reason: HOSPADM

## 2019-07-05 RX ORDER — PROMETHAZINE HYDROCHLORIDE 25 MG/ML
12.5 INJECTION, SOLUTION INTRAMUSCULAR; INTRAVENOUS ONCE AS NEEDED
Status: DISCONTINUED | OUTPATIENT
Start: 2019-07-05 | End: 2019-07-05 | Stop reason: HOSPADM

## 2019-07-05 RX ORDER — PROMETHAZINE HYDROCHLORIDE 25 MG/1
25 SUPPOSITORY RECTAL ONCE AS NEEDED
Status: DISCONTINUED | OUTPATIENT
Start: 2019-07-05 | End: 2019-07-05 | Stop reason: HOSPADM

## 2019-07-05 RX ORDER — PROMETHAZINE HYDROCHLORIDE 25 MG/1
25 TABLET ORAL ONCE AS NEEDED
Status: DISCONTINUED | OUTPATIENT
Start: 2019-07-05 | End: 2019-07-05 | Stop reason: HOSPADM

## 2019-07-05 RX ORDER — CITALOPRAM 40 MG/1
40 TABLET ORAL DAILY
COMMUNITY

## 2019-07-05 RX ORDER — PROPOFOL 10 MG/ML
VIAL (ML) INTRAVENOUS AS NEEDED
Status: DISCONTINUED | OUTPATIENT
Start: 2019-07-05 | End: 2019-07-05 | Stop reason: SURG

## 2019-07-05 RX ORDER — PROPOFOL 10 MG/ML
VIAL (ML) INTRAVENOUS AS NEEDED
Status: DISCONTINUED | OUTPATIENT
Start: 2019-07-05 | End: 2019-07-05

## 2019-07-05 RX ORDER — LIDOCAINE HYDROCHLORIDE 20 MG/ML
INJECTION, SOLUTION EPIDURAL; INFILTRATION; INTRACAUDAL; PERINEURAL AS NEEDED
Status: DISCONTINUED | OUTPATIENT
Start: 2019-07-05 | End: 2019-07-05 | Stop reason: SURG

## 2019-07-05 RX ORDER — ONDANSETRON 2 MG/ML
4 INJECTION INTRAMUSCULAR; INTRAVENOUS ONCE AS NEEDED
Status: DISCONTINUED | OUTPATIENT
Start: 2019-07-05 | End: 2019-07-05 | Stop reason: HOSPADM

## 2019-07-05 RX ADMIN — DEXTROSE AND SODIUM CHLORIDE 20 ML/HR: 5; 450 INJECTION, SOLUTION INTRAVENOUS at 14:15

## 2019-07-05 RX ADMIN — PROPOFOL 100 MG: 10 INJECTION, EMULSION INTRAVENOUS at 14:52

## 2019-07-05 RX ADMIN — LIDOCAINE HYDROCHLORIDE 50 MG: 20 INJECTION, SOLUTION EPIDURAL; INFILTRATION; INTRACAUDAL; PERINEURAL at 14:52

## 2019-07-05 NOTE — H&P
Altaf Foster DO,[facility]  Gastroenterology  Hepatology  Endoscopy  Board Certified in Internal Medicine and gastroenterology  [address], [address]  [place]. [address]  - [phone]   F - [phone]     GASTROENTEROLOGY HISTORY AND PHYSICAL  NOTE   ALTAF FOSTER DO.         SUBJECTIVE:   7/5/2019    Name: Arnoldo Guzman  DOD: 1934        Chief Complaint:       Subjective : Dysphasia with a known history of esophageal stricture    Patient is 84 y.o. male presents with desires for elective EGD with dilation of the esophagus.      ROS/HISTORY/ CURRENT MEDICATIONS/OBJECTIVE/VS/PE:   Review of Systems:  All systems unremarkable unless specified below.  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      • Cerebrovascular disease    • Cervical spondylosis     C5-6,C6-C7 with traction spurs at L2and L3   • Chronic sinusitis    • Coal workers' pneumoconiosis (CMS/HCC)    • COPD (chronic obstructive pulmonary disease) (CMS/HCC)    • Deep venous thrombosis of lower extremity (CMS/HCC)     RECURRENT   • Degeneration of lumbar intervertebral disc    • Depression    • Diabetes mellitus (CMS/HCC)     Hypoglycemic state in diabetes      • Dysphagia    • Dyspnea    • Edema of leg    • Hypertension    • Hypokalemia    • Infarction of lung due to iatrogenic pulmonary embolism (CMS/HCC)     BILATERAL   • Muscle weakness     RESIDUAL LEFT-SIDED   • Neck pain    • On anticoagulant therapy    • Pain in elbow    • Pseudophakia    • Psoriasis    • S/P insertion of IVC (inferior vena caval) filter 01/29/2010    Geoff pul emboli   •  Urinary incontinence      Past Surgical History:   Procedure Laterality Date   • CIRCUMCISION     • ENDOSCOPY  01/19/2001    Marked strictured region with mucosa at the gastroesophageal junction. 530.3   • EYE SURGERY Right 03/24/1999    CATARACT REMOVAL W/LENS IMPLANT   • PHALLOPLASTY, CIRCUMCISION N/A 2/27/2019    Procedure: CIRCUMCISION;  Surgeon: Chana, Raza KEARNS MD;  Location: [facility];  Service: Urology     Family History   Problem Relation Age of Onset   • Asthma Other    • Cancer Other    • Depression Other    • Diabetes Other    • Heart disease Other    • COPD Other    • Stroke Other    • Osteoarthritis Other    • Clotting disorder Other    • Seizures Other      Social History     Tobacco Use   • Smoking status: Never Smoker   • Smokeless tobacco: Current User     Types: Chew   • Tobacco comment: DOES NOT CURRENTLY SMOKE   Substance Use Topics   • Alcohol use: No   • Drug use: No     Medications Prior to Admission   Medication Sig Dispense Refill Last Dose   • citalopram (CeleXA) 40 MG tablet Take 40 mg by mouth Daily.   7/4/2019 at Unknown time   • lovastatin (MEVACOR) 40 MG tablet Take 40 mg by mouth Every Night.   7/4/2019 at Unknown time   • aspirin 81 MG EC tablet Take 81 mg by mouth Daily.   7/2/2019   • FLUoxetine (PROzac) 10 MG capsule Take 10 mg by mouth Daily.      • nystatin (MYCOSTATIN) 316735 UNIT/GM powder Apply  topically to the appropriate area as directed Every 12 (Twelve) Hours. 15 g 0 More than a month at Unknown time   • nystatin-triamcinolone (MYCOLOG II) 277638-9.1 UNIT/GM-% cream Apply  topically to the appropriate area as directed Every 12 (Twelve) Hours. 15 g 0 More than a month at Unknown time   • oxyCODONE-acetaminophen (PERCOCET) 7.5-325 MG per tablet Take 1-2 tablets by mouth Every 4 (Four) Hours As Needed (Pain). 10 tablet 0    • silver sulfadiazine (SILVADENE, SSD) 1 % cream Apply  topically to the appropriate area as directed Daily. 25 g 0    • tamsulosin (FLOMAX) 0.4 MG  capsule 24 hr capsule Take 1 capsule by mouth Every Night. 30 capsule 0    • warfarin (COUMADIN) 1 MG tablet Take 1 mg by mouth Daily.   7/2/2019     Allergies:  Patient has no known allergies.    I have reviewed the patients medical history, surgical history and family history in the available medical record system.     Current Medications:     Current Facility-Administered Medications   Medication Dose Route Frequency Provider Last Rate Last Dose   • dextrose 5 % and sodium chloride 0.45 % infusion  20 mL/hr Intravenous Continuous Eliezer Dillon DO 20 mL/hr at 07/05/19 1415 20 mL/hr at 07/05/19 1415       Objective     Physical Exam:   Temp:  [97 °F (36.1 °C)] 97 °F (36.1 °C)  Heart Rate:  [62] 62  Resp:  [18] 18  BP: (137)/(78) 137/78    Physical Exam:  General Appearance:    Alert, cooperative, in no acute distress   Head:    Normocephalic, without obvious abnormality, atraumatic   Eyes:            Lids and lashes normal, conjunctivae and sclerae normal, no   icterus, no pallor, corneas clear, PERRLA   Ears:    Ears appear intact with no abnormalities noted   Throat:   No oral lesions, no thrush, oral mucosa moist   Neck:   No adenopathy, supple, trachea midline, no thyromegaly, no     carotid bruit, no JVD   Back:     No kyphosis present, no scoliosis present, no skin lesions,       erythema or scars, no tenderness to percussion or                   palpation,   range of motion normal   Lungs:     Clear to auscultation,respirations regular, even and                   unlabored    Heart:    Regular rhythm and normal rate, normal S1 and S2, no            murmur, no gallop, no rub, no click   Breast Exam:    Deferred   Abdomen:     Normal bowel sounds, no masses, no organomegaly, soft        non-tender, non-distended, no guarding, no rebound                 tenderness   Genitalia:    Deferred   Extremities:   Moves all extremities well, no edema, no cyanosis, no              redness   Pulses:   Pulses palpable  and equal bilaterally   Skin:   No bleeding, bruising or rash   Lymph nodes:   No palpable adenopathy   Neurologic:   Cranial nerves 2 - 12 grossly intact, sensation intact, DTR        present and equal bilaterally      Results Review:     Lab Results   Component Value Date    WBC 7.41 04/04/2019    WBC 7.82 03/11/2019    WBC 8.53 03/10/2019    HGB 14.6 04/04/2019    HGB 13.4 03/11/2019    HGB 13.6 03/10/2019    HCT 41.2 04/04/2019    HCT 40.0 03/11/2019    HCT 39.5 03/10/2019     04/04/2019     03/11/2019     03/10/2019             No results found for: LIPASE  Lab Results   Component Value Date    INR 2.3 06/27/2019    INR 2.4 06/17/2019    INR 1.1 (L) 05/17/2019          Radiology Review:  Imaging Results (last 72 hours)     ** No results found for the last 72 hours. **           I reviewed the patient's new clinical results.  I reviewed the patient's new imaging results and agree with the interpretation.     ASSESSMENT/PLAN:   ASSESSMENT:  1.  Dysphasia with a history of acute food bolus obstruction  2.  Esophageal stricture    PLAN:  1.  Esophagogastroduodenoscopy with biopsies and dilation of the esophagus    Risk and benefits associated the procedure are reviewed with the patient.  The patient wished to proceed     Eliezer Dillon DO  07/05/19  2:39 PM

## 2019-07-05 NOTE — ANESTHESIA POSTPROCEDURE EVALUATION
Patient: Arnoldo Guzman    Procedure Summary     Date:  07/05/19 Room / Location:  [facility] ENDOSCOPY 3 / [facility] ENDOSCOPY    Anesthesia Start:  1449 Anesthesia Stop:  1506    Procedure:  ESOPHAGOGASTRODUODENOSCOPY (N/A ) Diagnosis:       Stricture esophagus      (Stricture esophagus [K22.2])    Surgeon:  Eliezer Dillon DO Provider:  Raza Yarbrough CRNA    Anesthesia Type:  MAC ASA Status:  3          Anesthesia Type: MAC  Last vitals  BP   137/78 (07/05/19 1415)   Temp   97 °F (36.1 °C) (07/05/19 1415)   Pulse   62 (07/05/19 1415)   Resp   18 (07/05/19 1415)     SpO2   95 % (07/05/19 1415)     Post Anesthesia Care and Evaluation    Patient location during evaluation: bedside  Patient participation: complete - patient participated  Level of consciousness: awake and alert  Pain score: 1  Pain management: adequate  Airway patency: patent  Anesthetic complications: No anesthetic complications  PONV Status: none  Cardiovascular status: acceptable  Respiratory status: acceptable  Hydration status: acceptable

## 2019-07-05 NOTE — ANESTHESIA PREPROCEDURE EVALUATION
Anesthesia Evaluation     Patient summary reviewed and Nursing notes reviewed   NPO Solid Status: > 8 hours  NPO Liquid Status: > 8 hours           Airway   No difficulty expected  Dental      Pulmonary - normal exam   (+) COPD, asthma, shortness of breath,   Cardiovascular - normal exam    (+) hypertension, DVT resolved,       Neuro/Psych  (+) CVA, psychiatric history Depression,     GI/Hepatic/Renal/Endo    (+)   diabetes mellitus,     Musculoskeletal     (+) neck pain,   Abdominal  - normal exam   Substance History      OB/GYN          Other   (+) arthritis                     Anesthesia Plan    ASA 3     MAC     intravenous induction   Anesthetic plan, all risks, benefits, and alternatives have been provided, discussed and informed consent has been obtained with: patient.    Plan discussed with CRNA.

## 2019-07-08 LAB
LAB AP CASE REPORT: NORMAL
PATH REPORT.FINAL DX SPEC: NORMAL
PATH REPORT.GROSS SPEC: NORMAL

## 2019-07-09 ENCOUNTER — HOSPITAL ENCOUNTER (OUTPATIENT)
Facility: HOSPITAL | Age: 84
Setting detail: HOSPITAL OUTPATIENT SURGERY
End: 2019-07-09
Attending: INTERNAL MEDICINE | Admitting: INTERNAL MEDICINE

## 2019-07-19 VITALS — HEIGHT: 68 IN | WEIGHT: 160 LBS | BODY MASS INDEX: 24.25 KG/M2

## 2020-06-20 ENCOUNTER — APPOINTMENT (OUTPATIENT)
Dept: CT IMAGING | Facility: HOSPITAL | Age: 85
End: 2020-06-20

## 2020-06-20 ENCOUNTER — APPOINTMENT (OUTPATIENT)
Dept: GENERAL RADIOLOGY | Facility: HOSPITAL | Age: 85
End: 2020-06-20

## 2020-06-20 ENCOUNTER — HOSPITAL ENCOUNTER (OUTPATIENT)
Facility: HOSPITAL | Age: 85
Setting detail: OBSERVATION
Discharge: HOME | End: 2020-06-23
Attending: EMERGENCY MEDICINE | Admitting: HOSPITALIST

## 2020-06-20 DIAGNOSIS — Z74.09 IMPAIRED FUNCTIONAL MOBILITY, BALANCE, GAIT, AND ENDURANCE: ICD-10-CM

## 2020-06-20 DIAGNOSIS — Z78.9 IMPAIRED MOBILITY AND ACTIVITIES OF DAILY LIVING: ICD-10-CM

## 2020-06-20 DIAGNOSIS — N30.01 ACUTE CYSTITIS WITH HEMATURIA: Primary | ICD-10-CM

## 2020-06-20 DIAGNOSIS — Z79.01 LONG TERM CURRENT USE OF ANTICOAGULANT THERAPY: ICD-10-CM

## 2020-06-20 DIAGNOSIS — Z74.09 IMPAIRED MOBILITY AND ACTIVITIES OF DAILY LIVING: ICD-10-CM

## 2020-06-20 DIAGNOSIS — R41.82 ALTERED MENTAL STATUS, UNSPECIFIED ALTERED MENTAL STATUS TYPE: ICD-10-CM

## 2020-06-20 DIAGNOSIS — Z78.9 FAILURE OF OUTPATIENT TREATMENT: ICD-10-CM

## 2020-06-20 LAB
ALBUMIN SERPL-MCNC: 3.3 G/DL (ref 3.5–5.2)
ALBUMIN/GLOB SERPL: 0.8 G/DL
ALP SERPL-CCNC: 97 U/L (ref 39–117)
ALT SERPL W P-5'-P-CCNC: 8 U/L (ref 1–41)
ANION GAP SERPL CALCULATED.3IONS-SCNC: 9 MMOL/L (ref 5–15)
ARTERIAL PATENCY WRIST A: ABNORMAL
AST SERPL-CCNC: 16 U/L (ref 1–40)
ATMOSPHERIC PRESS: 745 MMHG
BACTERIA UR QL AUTO: ABNORMAL /HPF
BASE EXCESS BLDA CALC-SCNC: 1.6 MMOL/L (ref 0–2)
BASOPHILS # BLD AUTO: 0.05 10*3/MM3 (ref 0–0.2)
BASOPHILS NFR BLD AUTO: 0.6 % (ref 0–1.5)
BDY SITE: ABNORMAL
BILIRUB SERPL-MCNC: 0.7 MG/DL (ref 0.2–1.2)
BILIRUB UR QL STRIP: NEGATIVE
BUN BLD-MCNC: 8 MG/DL (ref 8–23)
BUN/CREAT SERPL: 9.6 (ref 7–25)
CALCIUM SPEC-SCNC: 8.5 MG/DL (ref 8.6–10.5)
CHLORIDE SERPL-SCNC: 94 MMOL/L (ref 98–107)
CLARITY UR: ABNORMAL
CO2 SERPL-SCNC: 25 MMOL/L (ref 22–29)
COLOR UR: YELLOW
CREAT BLD-MCNC: 0.83 MG/DL (ref 0.76–1.27)
DEPRECATED RDW RBC AUTO: 44.2 FL (ref 37–54)
EOSINOPHIL # BLD AUTO: 0.05 10*3/MM3 (ref 0–0.4)
EOSINOPHIL NFR BLD AUTO: 0.6 % (ref 0.3–6.2)
ERYTHROCYTE [DISTWIDTH] IN BLOOD BY AUTOMATED COUNT: 13.8 % (ref 12.3–15.4)
GFR SERPL CREATININE-BSD FRML MDRD: 88 ML/MIN/1.73
GLOBULIN UR ELPH-MCNC: 3.9 GM/DL
GLUCOSE BLD-MCNC: 311 MG/DL (ref 65–99)
GLUCOSE UR STRIP-MCNC: ABNORMAL MG/DL
HCO3 BLDA-SCNC: 25.3 MMOL/L (ref 20–26)
HCT VFR BLD AUTO: 38.6 % (ref 37.5–51)
HGB BLD-MCNC: 12.7 G/DL (ref 13–17.7)
HGB UR QL STRIP.AUTO: ABNORMAL
HOLD SPECIMEN: NORMAL
HYALINE CASTS UR QL AUTO: ABNORMAL /LPF
IMM GRANULOCYTES # BLD AUTO: 0.03 10*3/MM3 (ref 0–0.05)
IMM GRANULOCYTES NFR BLD AUTO: 0.3 % (ref 0–0.5)
INR PPP: 2.61 (ref 0.8–1.2)
KETONES UR QL STRIP: ABNORMAL
LEUKOCYTE ESTERASE UR QL STRIP.AUTO: ABNORMAL
LYMPHOCYTES # BLD AUTO: 0.79 10*3/MM3 (ref 0.7–3.1)
LYMPHOCYTES NFR BLD AUTO: 9.1 % (ref 19.6–45.3)
Lab: ABNORMAL
MAGNESIUM SERPL-MCNC: 2.1 MG/DL (ref 1.6–2.4)
MCH RBC QN AUTO: 29 PG (ref 26.6–33)
MCHC RBC AUTO-ENTMCNC: 32.9 G/DL (ref 31.5–35.7)
MCV RBC AUTO: 88.1 FL (ref 79–97)
MODALITY: ABNORMAL
MONOCYTES # BLD AUTO: 0.49 10*3/MM3 (ref 0.1–0.9)
MONOCYTES NFR BLD AUTO: 5.6 % (ref 5–12)
NEUTROPHILS # BLD AUTO: 7.31 10*3/MM3 (ref 1.7–7)
NEUTROPHILS NFR BLD AUTO: 83.8 % (ref 42.7–76)
NITRITE UR QL STRIP: NEGATIVE
NRBC BLD AUTO-RTO: 0 /100 WBC (ref 0–0.2)
PCO2 BLDA: 35.6 MM HG (ref 35–45)
PH BLDA: 7.46 PH UNITS (ref 7.35–7.45)
PH UR STRIP.AUTO: 5.5 [PH] (ref 5–9)
PLATELET # BLD AUTO: 276 10*3/MM3 (ref 140–450)
PMV BLD AUTO: 8.9 FL (ref 6–12)
PO2 BLDA: 71 MM HG (ref 83–108)
POTASSIUM BLD-SCNC: 4.1 MMOL/L (ref 3.5–5.2)
PROT SERPL-MCNC: 7.2 G/DL (ref 6–8.5)
PROT UR QL STRIP: ABNORMAL
PROTHROMBIN TIME: 27.7 SECONDS (ref 11.1–15.3)
RBC # BLD AUTO: 4.38 10*6/MM3 (ref 4.14–5.8)
RBC # UR: ABNORMAL /HPF
REF LAB TEST METHOD: ABNORMAL
SAO2 % BLDCOA: 95.7 % (ref 94–99)
SODIUM BLD-SCNC: 128 MMOL/L (ref 136–145)
SP GR UR STRIP: 1.02 (ref 1–1.03)
SQUAMOUS #/AREA URNS HPF: ABNORMAL /HPF
UROBILINOGEN UR QL STRIP: ABNORMAL
VENTILATOR MODE: ABNORMAL
WBC NRBC COR # BLD: 8.72 10*3/MM3 (ref 3.4–10.8)
WBC UR QL AUTO: ABNORMAL /HPF

## 2020-06-20 PROCEDURE — 83735 ASSAY OF MAGNESIUM: CPT | Performed by: EMERGENCY MEDICINE

## 2020-06-20 PROCEDURE — 71045 X-RAY EXAM CHEST 1 VIEW: CPT

## 2020-06-20 PROCEDURE — P9612 CATHETERIZE FOR URINE SPEC: HCPCS

## 2020-06-20 PROCEDURE — 81001 URINALYSIS AUTO W/SCOPE: CPT | Performed by: EMERGENCY MEDICINE

## 2020-06-20 PROCEDURE — 25010000002 CEFEPIME 2 G/NS 100 ML SOLUTION: Performed by: EMERGENCY MEDICINE

## 2020-06-20 PROCEDURE — 96365 THER/PROPH/DIAG IV INF INIT: CPT

## 2020-06-20 PROCEDURE — 36600 WITHDRAWAL OF ARTERIAL BLOOD: CPT

## 2020-06-20 PROCEDURE — 70450 CT HEAD/BRAIN W/O DYE: CPT

## 2020-06-20 PROCEDURE — 85610 PROTHROMBIN TIME: CPT | Performed by: EMERGENCY MEDICINE

## 2020-06-20 PROCEDURE — 87086 URINE CULTURE/COLONY COUNT: CPT | Performed by: EMERGENCY MEDICINE

## 2020-06-20 PROCEDURE — 99285 EMERGENCY DEPT VISIT HI MDM: CPT

## 2020-06-20 PROCEDURE — 80053 COMPREHEN METABOLIC PANEL: CPT | Performed by: EMERGENCY MEDICINE

## 2020-06-20 PROCEDURE — 82803 BLOOD GASES ANY COMBINATION: CPT

## 2020-06-20 PROCEDURE — 85025 COMPLETE CBC W/AUTO DIFF WBC: CPT | Performed by: EMERGENCY MEDICINE

## 2020-06-20 RX ORDER — SODIUM CHLORIDE 0.9 % (FLUSH) 0.9 %
10 SYRINGE (ML) INJECTION AS NEEDED
Status: DISCONTINUED | OUTPATIENT
Start: 2020-06-20 | End: 2020-06-23 | Stop reason: HOSPADM

## 2020-06-20 RX ORDER — HYDROCODONE BITARTRATE AND ACETAMINOPHEN 10; 325 MG/1; MG/1
1 TABLET ORAL EVERY 6 HOURS PRN
COMMUNITY
Start: 2020-06-12

## 2020-06-20 RX ORDER — CYPROHEPTADINE HYDROCHLORIDE 4 MG/1
4 TABLET ORAL 3 TIMES DAILY PRN
COMMUNITY
Start: 2019-12-04

## 2020-06-20 RX ORDER — FLUCONAZOLE 150 MG/1
150 TABLET ORAL ONCE
Status: COMPLETED | OUTPATIENT
Start: 2020-06-20 | End: 2020-06-20

## 2020-06-20 RX ORDER — POTASSIUM CHLORIDE 1500 MG/1
1 TABLET, FILM COATED, EXTENDED RELEASE ORAL 2 TIMES DAILY
COMMUNITY

## 2020-06-20 RX ADMIN — FLUCONAZOLE 150 MG: 150 TABLET ORAL at 21:00

## 2020-06-20 RX ADMIN — Medication 10 ML: at 18:42

## 2020-06-20 RX ADMIN — CEFEPIME 2 G: 2 INJECTION, POWDER, FOR SOLUTION INTRAVENOUS at 21:21

## 2020-06-20 NOTE — ED PROVIDER NOTES
"Subjective   85-year-old male brought to the emergency department by his daughter with concern for altered mental status.  Reportedly he has frequent urinary tract infections that make him \"act crazy\".  He has history of CVA as well as COPD, degenerative disc disease, and is confined to a wheelchair.  Daughter is primary historian.  They deny any cough, vomiting or diarrhea.  She reports that he has been started on Cipro already and seems to be getting worse.  Completed Cipro today.  They do report a slip and fall out of his chair in the last few days for which she was seen and had an ankle x-ray but they did not know the result.  They deny that he hit his head at that time.    Family history, surgical history, social history, current medications and allergies are reviewed with the patient and triage documentation and vitals are reviewed.      History provided by:  Relative and medical records  History limited by:  Dementia and mental status change   used: No        Review of Systems   Unable to perform ROS: Mental status change   Constitutional: Negative for fever.   Respiratory: Negative for cough.    Genitourinary: Positive for dysuria.   Skin: Negative for wound.   Psychiatric/Behavioral: Positive for confusion.       Past Medical History:   Diagnosis Date   • Anxiety    • Asthma    • Backache     Lumbarsacral radiculopathy      • Cataract    • Cerebrovascular accident (CMS/HCC)     R thalamic w L side residual hemiparesis      • Cerebrovascular disease    • Cervical spondylosis     C5-6,C6-C7 with traction spurs at L2and L3   • Chronic sinusitis    • Coal workers' pneumoconiosis (CMS/HCC)    • COPD (chronic obstructive pulmonary disease) (CMS/HCC)    • Deep venous thrombosis of lower extremity (CMS/HCC)     RECURRENT   • Degeneration of lumbar intervertebral disc    • Depression    • Diabetes mellitus (CMS/HCC)     Hypoglycemic state in diabetes      • Dysphagia    • Dyspnea    • Edema of " leg    • Hypertension    • Hypokalemia    • Infarction of lung due to iatrogenic pulmonary embolism (CMS/HCC)     BILATERAL   • Muscle weakness     RESIDUAL LEFT-SIDED   • Neck pain    • On anticoagulant therapy    • Pain in elbow    • Pseudophakia    • Psoriasis    • S/P insertion of IVC (inferior vena caval) filter 01/29/2010    Geoff pul emboli   • Urinary incontinence        No Known Allergies    Past Surgical History:   Procedure Laterality Date   • CIRCUMCISION     • ENDOSCOPY  01/19/2001    Marked strictured region with mucosa at the gastroesophageal junction. 530.3   • ENDOSCOPY N/A 7/5/2019    Procedure: ESOPHAGOGASTRODUODENOSCOPY;  Surgeon: Eliezer Dillon DO;  Location: [facility] ENDOSCOPY;  Service: Gastroenterology   • EYE SURGERY Right 03/24/1999    CATARACT REMOVAL W/LENS IMPLANT   • PHALLOPLASTY, CIRCUMCISION N/A 2/27/2019    Procedure: CIRCUMCISION;  Surgeon: Raza Zayas MD;  Location: [facility] OR;  Service: Urology       Family History   Problem Relation Age of Onset   • Asthma Other    • Cancer Other    • Depression Other    • Diabetes Other    • Heart disease Other    • COPD Other    • Stroke Other    • Osteoarthritis Other    • Clotting disorder Other    • Seizures Other        Social History     Socioeconomic History   • Marital status:      Spouse name: Not on file   • Number of children: Not on file   • Years of education: Not on file   • Highest education level: Not on file   Tobacco Use   • Smoking status: Never Smoker   • Smokeless tobacco: Current User     Types: Chew   • Tobacco comment: DOES NOT CURRENTLY SMOKE   Substance and Sexual Activity   • Alcohol use: No   • Drug use: No   • Sexual activity: Defer           Objective   Physical Exam   Constitutional: He appears well-developed and well-nourished.  Non-toxic appearance. He does not appear ill. No distress.   HENT:   Head: Normocephalic.   Mouth/Throat: Oropharynx is clear and moist.   Eyes: Pupils are equal, round, and  reactive to light.   Neck: Normal range of motion.   Cardiovascular: Normal rate and regular rhythm.   No murmur heard.  Pulmonary/Chest: Effort normal and breath sounds normal. No respiratory distress. He has no wheezes.   Abdominal: Soft. Bowel sounds are normal. There is no tenderness.   Musculoskeletal: Normal range of motion.   Neurological: He is alert. He is disoriented. GCS eye subscore is 4. GCS verbal subscore is 4. GCS motor subscore is 5.   Skin: Skin is warm. Capillary refill takes less than 2 seconds.   Psychiatric: He is agitated.   Nursing note and vitals reviewed.      Procedures  none         ED Course      Labs Reviewed   URINE CULTURE - Abnormal; Notable for the following components:       Result Value    Urine Culture Yeast isolated (*)     All other components within normal limits    Narrative:     No further workup   PROTIME-INR - Abnormal; Notable for the following components:    Protime 27.7 (*)     INR 2.61 (*)     All other components within normal limits    Narrative:     Therapeutic range for most indications is 2.0-3.0 INR,  or 2.5-3.5 for mechanical heart valves.   COMPREHENSIVE METABOLIC PANEL - Abnormal; Notable for the following components:    Glucose 311 (*)     Sodium 128 (*)     Chloride 94 (*)     Calcium 8.5 (*)     Albumin 3.30 (*)     All other components within normal limits    Narrative:     GFR Normal >60  Chronic Kidney Disease <60  Kidney Failure <15     URINALYSIS W/ CULTURE IF INDICATED - Abnormal; Notable for the following components:    Appearance, UA Turbid (*)     Glucose, UA >=1000 mg/dL (3+) (*)     Ketones, UA Trace (*)     Blood, UA Large (3+) (*)     Protein, UA 30 mg/dL (1+) (*)     Leuk Esterase, UA Large (3+) (*)     All other components within normal limits   CBC WITH AUTO DIFFERENTIAL - Abnormal; Notable for the following components:    Hemoglobin 12.7 (*)     Neutrophil % 83.8 (*)     Lymphocyte % 9.1 (*)     Neutrophils, Absolute 7.31 (*)     All other  components within normal limits   URINALYSIS, MICROSCOPIC ONLY - Abnormal; Notable for the following components:    RBC, UA Too Numerous to Count (*)     WBC, UA Too Numerous to Count (*)     Bacteria, UA Trace (*)     All other components within normal limits   BLOOD GAS, ARTERIAL - Abnormal; Notable for the following components:    pH, Arterial 7.460 (*)     pO2, Arterial 71.0 (*)     All other components within normal limits   CBC WITH AUTO DIFFERENTIAL - Abnormal; Notable for the following components:    Lymphocyte % 16.8 (*)     Neutrophils, Absolute 7.16 (*)     All other components within normal limits   COMPREHENSIVE METABOLIC PANEL - Abnormal; Notable for the following components:    Glucose 189 (*)     Creatinine 0.74 (*)     Sodium 129 (*)     Chloride 95 (*)     Albumin 3.30 (*)     All other components within normal limits    Narrative:     GFR Normal >60  Chronic Kidney Disease <60  Kidney Failure <15     PHOSPHORUS - Abnormal; Notable for the following components:    Phosphorus 2.1 (*)     All other components within normal limits   PROTIME-INR - Abnormal; Notable for the following components:    Protime 26.5 (*)     INR 2.47 (*)     All other components within normal limits    Narrative:     Therapeutic range for most indications is 2.0-3.0 INR,  or 2.5-3.5 for mechanical heart valves.   MAGNESIUM - Normal   MAGNESIUM - Normal   TSH - Normal   BLOOD GAS, ARTERIAL   CBC AND DIFFERENTIAL    Narrative:     The following orders were created for panel order CBC & Differential.  Procedure                               Abnormality         Status                     ---------                               -----------         ------                     CBC Auto Differential[[id number]]        Abnormal            Final result                 Please view results for these tests on the individual orders.   EXTRA TUBES    Narrative:     The following orders were created for panel order Extra Tubes.  Procedure                                Abnormality         Status                     ---------                               -----------         ------                     Gold Top - SST[[id number]]                                   Final result                 Please view results for these tests on the individual orders.   [facility]     Ct Head Without Contrast    Result Date: 6/20/2020  Narrative: CT head without contrast on 6/20/2020 CLINICAL INDICATION: Altered mental status TECHNIQUE:  Multiple axial images are obtained throughout the head without the administration of contrast.  This exam was performed according to our departmental dose-optimization program, which includes automated exposure control, adjustment of the mA and/or kV according to patient size and/or use of iterative reconstruction technique. Total DLP is 1075.4 mGy*cm. COMPARISON: 3/7/2019 FINDINGS:  There is generalized cerebral atrophy. There is low density in the periventricular white matter consistent with chronic small vessel ischemic changes. There is no hydrocephalus. There is no hemorrhage. There are no abnormal extra-axial fluid collections. There is no mass, mass effect or midline shift. There is no CT evidence of acute infarct. No bony abnormality is noted.     Impression: Atrophy and chronic small vessel ischemic changes with no acute intracranial abnormality. Electronically signed by:  Delgado Bess  6/20/2020 10:42 PM CDT Workstation: [id number]    Xr Chest 1 View    Result Date: 6/20/2020  Narrative: PROCEDURE: XR CHEST 1 VW VIEWS:Single INDICATION: Altered mental status COMPARISON: CXR: 3/6/2019 FINDINGS:   - lines/tubes: None   - cardiac: Size within normal limits.   - mediastinum: Contour within normal limits. Atherosclerotic vascular calcification   - lungs: Low lung volumes. Streaky bibasilar opacities may represent atelectasis and/or infiltrate..   - pleura: No evidence of  fluid.    - osseous: Unremarkable for age.       Impression:  Low lung volumes with streaky bibasilar atelectasis and/or infiltrate Electronically signed by:  Elyssa Montes MD  6/20/2020 8:11 PM CDT Workstation: [id number]          MDM  Number of Diagnoses or Management Options  Acute cystitis with hematuria:   Altered mental status, unspecified altered mental status type:   Failure of outpatient treatment:      Amount and/or Complexity of Data Reviewed  Clinical lab tests: reviewed  Tests in the radiology section of CPT®: reviewed  Discuss the patient with other providers: yes    Patient Progress  Patient progress: stable    Patient found to have significant acute cystitis with hematuria.  Failure of outpatient treatment.  Remainder of work-up overall unremarkable.  Does not appear to be septic.  Hospitalist agreeable to admission.    Final diagnoses:   Acute cystitis with hematuria   Failure of outpatient treatment   Altered mental status, unspecified altered mental status type            Orion Thompson,   06/21/20 [id number]

## 2020-06-21 LAB
ALBUMIN SERPL-MCNC: 3.3 G/DL (ref 3.5–5.2)
ALBUMIN/GLOB SERPL: 0.8 G/DL
ALP SERPL-CCNC: 91 U/L (ref 39–117)
ALT SERPL W P-5'-P-CCNC: 9 U/L (ref 1–41)
ANION GAP SERPL CALCULATED.3IONS-SCNC: 11 MMOL/L (ref 5–15)
AST SERPL-CCNC: 22 U/L (ref 1–40)
BACTERIA SPEC AEROBE CULT: ABNORMAL
BASOPHILS # BLD AUTO: 0.06 10*3/MM3 (ref 0–0.2)
BASOPHILS NFR BLD AUTO: 0.6 % (ref 0–1.5)
BILIRUB SERPL-MCNC: 0.8 MG/DL (ref 0.2–1.2)
BUN BLD-MCNC: 8 MG/DL (ref 8–23)
BUN/CREAT SERPL: 10.8 (ref 7–25)
CALCIUM SPEC-SCNC: 8.7 MG/DL (ref 8.6–10.5)
CHLORIDE SERPL-SCNC: 95 MMOL/L (ref 98–107)
CO2 SERPL-SCNC: 23 MMOL/L (ref 22–29)
CREAT BLD-MCNC: 0.74 MG/DL (ref 0.76–1.27)
DEPRECATED RDW RBC AUTO: 44.4 FL (ref 37–54)
EOSINOPHIL # BLD AUTO: 0.17 10*3/MM3 (ref 0–0.4)
EOSINOPHIL NFR BLD AUTO: 1.8 % (ref 0.3–6.2)
ERYTHROCYTE [DISTWIDTH] IN BLOOD BY AUTOMATED COUNT: 13.8 % (ref 12.3–15.4)
GFR SERPL CREATININE-BSD FRML MDRD: 101 ML/MIN/1.73
GLOBULIN UR ELPH-MCNC: 4 GM/DL
GLUCOSE BLD-MCNC: 189 MG/DL (ref 65–99)
HCT VFR BLD AUTO: 38.2 % (ref 37.5–51)
HGB BLD-MCNC: 13 G/DL (ref 13–17.7)
IMM GRANULOCYTES # BLD AUTO: 0.05 10*3/MM3 (ref 0–0.05)
IMM GRANULOCYTES NFR BLD AUTO: 0.5 % (ref 0–0.5)
INR PPP: 2.47 (ref 0.8–1.2)
LYMPHOCYTES # BLD AUTO: 1.62 10*3/MM3 (ref 0.7–3.1)
LYMPHOCYTES NFR BLD AUTO: 16.8 % (ref 19.6–45.3)
MAGNESIUM SERPL-MCNC: 2 MG/DL (ref 1.6–2.4)
MCH RBC QN AUTO: 29.7 PG (ref 26.6–33)
MCHC RBC AUTO-ENTMCNC: 34 G/DL (ref 31.5–35.7)
MCV RBC AUTO: 87.2 FL (ref 79–97)
MONOCYTES # BLD AUTO: 0.6 10*3/MM3 (ref 0.1–0.9)
MONOCYTES NFR BLD AUTO: 6.2 % (ref 5–12)
NEUTROPHILS # BLD AUTO: 7.16 10*3/MM3 (ref 1.7–7)
NEUTROPHILS NFR BLD AUTO: 74.1 % (ref 42.7–76)
NRBC BLD AUTO-RTO: 0 /100 WBC (ref 0–0.2)
PHOSPHATE SERPL-MCNC: 2.1 MG/DL (ref 2.5–4.5)
PLATELET # BLD AUTO: 288 10*3/MM3 (ref 140–450)
PMV BLD AUTO: 8.7 FL (ref 6–12)
POTASSIUM BLD-SCNC: 3.7 MMOL/L (ref 3.5–5.2)
PROT SERPL-MCNC: 7.3 G/DL (ref 6–8.5)
PROTHROMBIN TIME: 26.5 SECONDS (ref 11.1–15.3)
RBC # BLD AUTO: 4.38 10*6/MM3 (ref 4.14–5.8)
SODIUM BLD-SCNC: 129 MMOL/L (ref 136–145)
TSH SERPL DL<=0.05 MIU/L-ACNC: 1.29 UIU/ML (ref 0.27–4.2)
WBC NRBC COR # BLD: 9.66 10*3/MM3 (ref 3.4–10.8)

## 2020-06-21 PROCEDURE — 85025 COMPLETE CBC W/AUTO DIFF WBC: CPT | Performed by: INTERNAL MEDICINE

## 2020-06-21 PROCEDURE — 85610 PROTHROMBIN TIME: CPT | Performed by: NURSE PRACTITIONER

## 2020-06-21 PROCEDURE — G0378 HOSPITAL OBSERVATION PER HR: HCPCS

## 2020-06-21 PROCEDURE — 84443 ASSAY THYROID STIM HORMONE: CPT | Performed by: INTERNAL MEDICINE

## 2020-06-21 PROCEDURE — 96367 TX/PROPH/DG ADDL SEQ IV INF: CPT

## 2020-06-21 PROCEDURE — 80053 COMPREHEN METABOLIC PANEL: CPT | Performed by: INTERNAL MEDICINE

## 2020-06-21 PROCEDURE — 83735 ASSAY OF MAGNESIUM: CPT | Performed by: INTERNAL MEDICINE

## 2020-06-21 PROCEDURE — 84100 ASSAY OF PHOSPHORUS: CPT | Performed by: INTERNAL MEDICINE

## 2020-06-21 PROCEDURE — 25010000002 PIPERACILLIN SOD-TAZOBACTAM PER 1 G: Performed by: INTERNAL MEDICINE

## 2020-06-21 RX ORDER — ONDANSETRON 4 MG/1
4 TABLET, FILM COATED ORAL EVERY 6 HOURS PRN
Status: DISCONTINUED | OUTPATIENT
Start: 2020-06-21 | End: 2020-06-23 | Stop reason: HOSPADM

## 2020-06-21 RX ORDER — ATORVASTATIN CALCIUM 10 MG/1
10 TABLET, FILM COATED ORAL NIGHTLY
Status: DISCONTINUED | OUTPATIENT
Start: 2020-06-21 | End: 2020-06-23 | Stop reason: HOSPADM

## 2020-06-21 RX ORDER — CITALOPRAM 20 MG/1
20 TABLET ORAL DAILY
Status: DISCONTINUED | OUTPATIENT
Start: 2020-06-21 | End: 2020-06-23 | Stop reason: HOSPADM

## 2020-06-21 RX ORDER — CALCIUM CARBONATE 200(500)MG
2 TABLET,CHEWABLE ORAL 2 TIMES DAILY PRN
Status: DISCONTINUED | OUTPATIENT
Start: 2020-06-21 | End: 2020-06-23 | Stop reason: HOSPADM

## 2020-06-21 RX ORDER — SODIUM CHLORIDE 0.9 % (FLUSH) 0.9 %
10 SYRINGE (ML) INJECTION EVERY 12 HOURS SCHEDULED
Status: DISCONTINUED | OUTPATIENT
Start: 2020-06-21 | End: 2020-06-23 | Stop reason: HOSPADM

## 2020-06-21 RX ORDER — SODIUM CHLORIDE 0.9 % (FLUSH) 0.9 %
10 SYRINGE (ML) INJECTION AS NEEDED
Status: DISCONTINUED | OUTPATIENT
Start: 2020-06-21 | End: 2020-06-23 | Stop reason: HOSPADM

## 2020-06-21 RX ORDER — TAMSULOSIN HYDROCHLORIDE 0.4 MG/1
0.4 CAPSULE ORAL NIGHTLY
Status: DISCONTINUED | OUTPATIENT
Start: 2020-06-21 | End: 2020-06-23 | Stop reason: HOSPADM

## 2020-06-21 RX ORDER — BISACODYL 5 MG/1
5 TABLET, DELAYED RELEASE ORAL DAILY PRN
Status: DISCONTINUED | OUTPATIENT
Start: 2020-06-21 | End: 2020-06-23 | Stop reason: HOSPADM

## 2020-06-21 RX ORDER — ONDANSETRON 2 MG/ML
4 INJECTION INTRAMUSCULAR; INTRAVENOUS EVERY 6 HOURS PRN
Status: DISCONTINUED | OUTPATIENT
Start: 2020-06-21 | End: 2020-06-23 | Stop reason: HOSPADM

## 2020-06-21 RX ORDER — SODIUM CHLORIDE 9 MG/ML
50 INJECTION, SOLUTION INTRAVENOUS CONTINUOUS
Status: DISCONTINUED | OUTPATIENT
Start: 2020-06-21 | End: 2020-06-23

## 2020-06-21 RX ORDER — ACETAMINOPHEN 160 MG/5ML
650 SOLUTION ORAL EVERY 4 HOURS PRN
Status: DISCONTINUED | OUTPATIENT
Start: 2020-06-21 | End: 2020-06-21 | Stop reason: SDUPTHER

## 2020-06-21 RX ORDER — ACETAMINOPHEN 325 MG/1
650 TABLET ORAL EVERY 4 HOURS PRN
Status: DISCONTINUED | OUTPATIENT
Start: 2020-06-21 | End: 2020-06-23 | Stop reason: HOSPADM

## 2020-06-21 RX ORDER — ACETAMINOPHEN 650 MG/1
650 SUPPOSITORY RECTAL EVERY 4 HOURS PRN
Status: DISCONTINUED | OUTPATIENT
Start: 2020-06-21 | End: 2020-06-23 | Stop reason: HOSPADM

## 2020-06-21 RX ORDER — FAMOTIDINE 40 MG/1
40 TABLET, FILM COATED ORAL DAILY
Status: DISCONTINUED | OUTPATIENT
Start: 2020-06-21 | End: 2020-06-23 | Stop reason: HOSPADM

## 2020-06-21 RX ORDER — LANOLIN ALCOHOL/MO/W.PET/CERES
5 CREAM (GRAM) TOPICAL NIGHTLY PRN
Status: DISCONTINUED | OUTPATIENT
Start: 2020-06-21 | End: 2020-06-23 | Stop reason: HOSPADM

## 2020-06-21 RX ORDER — NYSTATIN 100000 [USP'U]/G
POWDER TOPICAL EVERY 12 HOURS SCHEDULED
Status: DISCONTINUED | OUTPATIENT
Start: 2020-06-21 | End: 2020-06-23 | Stop reason: HOSPADM

## 2020-06-21 RX ORDER — ASPIRIN 81 MG/1
81 TABLET ORAL DAILY
Status: DISCONTINUED | OUTPATIENT
Start: 2020-06-21 | End: 2020-06-23 | Stop reason: HOSPADM

## 2020-06-21 RX ORDER — BISACODYL 10 MG
10 SUPPOSITORY, RECTAL RECTAL DAILY PRN
Status: DISCONTINUED | OUTPATIENT
Start: 2020-06-21 | End: 2020-06-23 | Stop reason: HOSPADM

## 2020-06-21 RX ADMIN — PIPERACILLIN SODIUM AND TAZOBACTAM SODIUM 3.38 G: 3; .375 INJECTION, POWDER, LYOPHILIZED, FOR SOLUTION INTRAVENOUS at 20:03

## 2020-06-21 RX ADMIN — SODIUM CHLORIDE, PRESERVATIVE FREE 10 ML: 5 INJECTION INTRAVENOUS at 09:33

## 2020-06-21 RX ADMIN — FAMOTIDINE 40 MG: 40 TABLET, FILM COATED ORAL at 09:33

## 2020-06-21 RX ADMIN — HYDROCORTISONE: 1 CREAM TOPICAL at 20:03

## 2020-06-21 RX ADMIN — ATORVASTATIN CALCIUM 10 MG: 10 TABLET, FILM COATED ORAL at 20:03

## 2020-06-21 RX ADMIN — HYDROCORTISONE: 1 CREAM TOPICAL at 13:10

## 2020-06-21 RX ADMIN — SODIUM CHLORIDE 50 ML/HR: 9 INJECTION, SOLUTION INTRAVENOUS at 17:55

## 2020-06-21 RX ADMIN — PIPERACILLIN SODIUM AND TAZOBACTAM SODIUM 3.38 G: 3; .375 INJECTION, POWDER, LYOPHILIZED, FOR SOLUTION INTRAVENOUS at 06:20

## 2020-06-21 RX ADMIN — PIPERACILLIN SODIUM AND TAZOBACTAM SODIUM 3.38 G: 3; .375 INJECTION, POWDER, LYOPHILIZED, FOR SOLUTION INTRAVENOUS at 13:09

## 2020-06-21 RX ADMIN — NYSTATIN: 100000 POWDER TOPICAL at 22:33

## 2020-06-21 RX ADMIN — CITALOPRAM HYDROBROMIDE 20 MG: 20 TABLET ORAL at 09:33

## 2020-06-21 RX ADMIN — ASPIRIN 81 MG: 81 TABLET, COATED ORAL at 09:33

## 2020-06-21 RX ADMIN — TAMSULOSIN HYDROCHLORIDE 0.4 MG: 0.4 CAPSULE ORAL at 20:03

## 2020-06-21 NOTE — PROGRESS NOTES
[facility] Medicine Services  INPATIENT PROGRESS NOTE    Length of Stay: 0  Date of Admission: 6/20/2020  Primary Care Physician: Sony Alvarado MD    Subjective   Chief Complaint: confusion   HPI:  85 year old  male with past medical history of anxiety, vascular dementia, history of CVA, COPD, history of DVT, HTN, history of PE, who presented on 6/20/2020 with altered mental status.  She is currently admitted for acute cystitis that was unresponsive to outpatient treatment with Cipro.     Review of Systems   Unable to perform ROS: Dementia        All pertinent negatives and positives are as above. All other systems have been reviewed and are negative unless otherwise stated.     Objective    Temp:  [98.1 °F (36.7 °C)-98.7 °F (37.1 °C)] 98.1 °F (36.7 °C)  Heart Rate:  [84-98] 86  Resp:  [16-20] 18  BP: (128-151)/(67-97) 130/84    Physical Exam   Constitutional: Vital signs are normal. He appears well-developed and well-nourished. He is cooperative.   HENT:   Head: Normocephalic and atraumatic.   Right Ear: External ear normal.   Left Ear: External ear normal.   Nose: Nose normal.   Eyes: Conjunctivae are normal. Right eye exhibits no discharge. Left eye exhibits no discharge. No scleral icterus.   Neck: Normal range of motion. Neck supple. No JVD present.   Cardiovascular: Normal rate, regular rhythm, normal heart sounds and intact distal pulses. Exam reveals no gallop and no friction rub.   No murmur heard.  Pulmonary/Chest: Effort normal and breath sounds normal. No stridor. No respiratory distress. He has no wheezes. He has no rales.   Abdominal: Soft. Bowel sounds are normal. He exhibits no distension. There is no tenderness.   Musculoskeletal: Normal range of motion. He exhibits no edema.   Neurological: He is alert. He is disoriented.   Skin: Skin is warm and dry.   Psychiatric: He has a normal mood and affect. His behavior is normal.   Nursing note  and vitals reviewed.          Results Review:  I have reviewed the labs, radiology results, and diagnostic studies.    Laboratory Data:   Results from last 7 days   Lab Units 06/21/20  0607 06/20/20  1842   SODIUM mmol/L 129* 128*   POTASSIUM mmol/L 3.7 4.1   CHLORIDE mmol/L 95* 94*   CO2 mmol/L 23.0 25.0   BUN mg/dL 8 8   CREATININE mg/dL 0.74* 0.83   GLUCOSE mg/dL 189* 311*   CALCIUM mg/dL 8.7 8.5*   BILIRUBIN mg/dL 0.8 0.7   ALK PHOS U/L 91 97   ALT (SGPT) U/L 9 8   AST (SGOT) U/L 22 16   ANION GAP mmol/L 11.0 9.0     Estimated Creatinine Clearance: 67.2 mL/min (A) (by C-G formula based on SCr of 0.74 mg/dL (L)).  Results from last 7 days   Lab Units 06/21/20  0607 06/20/20  1842   MAGNESIUM mg/dL 2.0 2.1   PHOSPHORUS mg/dL 2.1*  --          Results from last 7 days   Lab Units 06/21/20  0607 06/20/20  1842   WBC 10*3/mm3 9.66 8.72   HEMOGLOBIN g/dL 13.0 12.7*   HEMATOCRIT % 38.2 38.6   PLATELETS 10*3/mm3 288 276     Results from last 7 days   Lab Units 06/20/20  1842   INR  2.61*       Culture Data:   No results found for: BLOODCX  Urine Culture   Date Value Ref Range Status   06/20/2020 Yeast isolated (A)  Final     No results found for: RESPCX  No results found for: WOUNDCX  No results found for: STOOLCX  No components found for: BODYFLD    Radiology Data:   Imaging Results (Last 24 Hours)     Procedure Component Value Units Date/Time    CT Head Without Contrast [[id number]] Collected:  06/20/20 2221     Updated:  06/20/20 2243    Narrative:         CT head without contrast on 6/20/2020     CLINICAL INDICATION: Altered mental status    TECHNIQUE:  Multiple axial images are obtained throughout the  head without the administration of contrast.  This exam was  performed according to our departmental dose-optimization  program, which includes automated exposure control, adjustment of  the mA and/or kV according to patient size and/or use of  iterative reconstruction technique.  Total DLP is 1075.4  mGy*cm.    COMPARISON: 3/7/2019    FINDINGS:  There is generalized cerebral atrophy. There is low  density in the periventricular white matter consistent with  chronic small vessel ischemic changes. There is no hydrocephalus.  There is no hemorrhage. There are no abnormal extra-axial fluid  collections. There is no mass, mass effect or midline shift.  There is no CT evidence of acute infarct. No bony abnormality is  noted.      Impression:       Atrophy and chronic small vessel ischemic changes  with no acute intracranial abnormality.    Electronically signed by:  Delgado Bess  6/20/2020 10:42 PM  CDT Workstation: [id number]    XR Chest 1 View [[id number]] Collected:  06/20/20 1825     Updated:  06/20/20 2012    Narrative:       PROCEDURE: XR CHEST 1 VW    VIEWS:Single    INDICATION: Altered mental status    COMPARISON: CXR: 3/6/2019    FINDINGS:       - lines/tubes: None    - cardiac: Size within normal limits.    - mediastinum: Contour within normal limits. Atherosclerotic  vascular calcification    - lungs: Low lung volumes. Streaky bibasilar opacities may  represent atelectasis and/or infiltrate..     - pleura: No evidence of  fluid.      - osseous: Unremarkable for age.        Impression:       Low lung volumes with streaky bibasilar atelectasis and/or  infiltrate      Electronically signed by:  Elyssa Montes MD  6/20/2020 8:11 PM CDT  Workstation: [id number]          I have reviewed the patient's current medications.     Assessment/Plan     Active Hospital Problems    Diagnosis   • Acute cystitis with hematuria       Plan:   1. Acute metabolic encephalopathy r/t acute cystitis: continue Zosyn and follow urine cultures. Ct head negative aside from small vessel changes (chronic).    2. History of DVT/PE: Warfarin dosing per pharmacy.  No hematuria noted today per nursing.  Will restart and monitor.   3. HLD: continue statin.   4. Anxiety/depression: continue Celexa.   5. BPH: continue Flomax.          This  document has been electronically signed by KRISTINA Ennis on June 21, 2020 11:58

## 2020-06-21 NOTE — H&P
[facility] Medicine Admission      Date of Admission: 6/20/2020      Primary Care Physician: Sony Alvarado MD      Chief Complaint: Confusion  HPI:    85-year-old gentleman with past medical history significant for anxiety, asthma, history of CVA, COPD, history of DVT, hypertension, history pulmonary embolism, who presented to the hospital with confusion as well as altered mental status.  Patient apparently has been receiving treatment for possible urinary tract infection outpatient with ciprofloxacin without much improvement in his symptoms.  He is not able to give any history by himself.  He is reported to have worsening confusion but is not reported to have any numbness/tingling/weakness/bowel/bladder incontinence.  He was also not reported to have any fever, chills, rigors, cough, shortness of breath, wheezing.  Patient currently himself denies any active complaints.    On arrival to the ER, patient was initiated on IV antibiotics for possible failed outpatient urinary tract infection treatment  CT head was negative in the ER.  She is being admitted to the hospital service for further evaluation management.        Concurrent Medical History:  has a past medical history of Anxiety, Asthma, Backache, Cataract, Cerebrovascular accident (CMS/HCC), Cerebrovascular disease, Cervical spondylosis, Chronic sinusitis, Coal workers' pneumoconiosis (CMS/HCC), COPD (chronic obstructive pulmonary disease) (CMS/HCC), Deep venous thrombosis of lower extremity (CMS/HCC), Degeneration of lumbar intervertebral disc, Depression, Diabetes mellitus (CMS/HCC), Dysphagia, Dyspnea, Edema of leg, Hypertension, Hypokalemia, Infarction of lung due to iatrogenic pulmonary embolism (CMS/HCC), Muscle weakness, Neck pain, On anticoagulant therapy, Pain in elbow, Pseudophakia, Psoriasis, S/P insertion of IVC (inferior vena caval) filter (01/29/2010), and Urinary incontinence.    Past  Surgical History:  has a past surgical history that includes Eye surgery (Right, 1999); Esophagogastroduodenoscopy (2001); Circumcision, non-; phalloplasty, circumcision (N/A, 2019); and Esophagogastroduodenoscopy (N/A, 2019).    Family History: family history includes Asthma in an other family member; COPD in an other family member; Cancer in an other family member; Clotting disorder in an other family member; Depression in an other family member; Diabetes in an other family member; Heart disease in an other family member; Osteoarthritis in an other family member; Seizures in an other family member; Stroke in an other family member.       Social History:  reports that he has never smoked. His smokeless tobacco use includes chew. He reports that he does not drink alcohol or use drugs.    Allergies: No Known Allergies    Medications:   Prior to Admission medications    Medication Sig Start Date End Date Taking? Authorizing Provider   cyproheptadine (PERIACTIN) 4 MG tablet Take 4 mg by mouth 3 (Three) Times a Day As Needed. 19  Yes Yuriy Tavarez MD   HYDROcodone-acetaminophen (NORCO)  MG per tablet Take 1 tablet by mouth Every 6 (Six) Hours As Needed. 20  Yes Yuriy Tavarez MD   aspirin 81 MG EC tablet Take 81 mg by mouth Daily.    Yuriy Tavarez MD   citalopram (CeleXA) 40 MG tablet Take 40 mg by mouth Daily.    Yuriy Tavarez MD   lovastatin (MEVACOR) 40 MG tablet Take 40 mg by mouth Every Night.    Yuriy Tavarez MD   potassium chloride (K-DUR,KLOR-CON) 20 MEQ tablet controlled-release ER tablet Take 1 tablet by mouth 2 (two) times a day.    Yuriy Tavarez MD   tamsulosin (FLOMAX) 0.4 MG capsule 24 hr capsule Take 1 capsule by mouth Every Night. 3/11/19   Rey Gallardo APRN   warfarin (COUMADIN) 1 MG tablet Take 1 mg by mouth Daily.    Yuriy Tavarez MD       Review of Systems:  Review of Systems   Otherwise  complete ROS is negative except as mentioned above.    Physical Exam:   Temp:  [98.1 °F (36.7 °C)-98.7 °F (37.1 °C)] 98.7 °F (37.1 °C)  Heart Rate:  [88-98] 91  Resp:  [18-20] 18  BP: (133-151)/(67-97) 133/97  Physical Exam  General: [Appears stated age, alert, oriented ×0 cooperative]  HEENT: [Normocephalic, atraumatic, EOMI, PERRLA, unremarkable external ear, moist mucous membranes, supple neck, no lymphadenopathy]  CVS: [RRR, S1, S2, no murmurs, normal peripheral pulses]  Respiratory: [CTA bilaterally, symmetrical expansion, no wheezing, no rales, no crackles]  Gastrointestinal: [Soft, nontender, nondistended, no organomegaly could be appreciated, normal bowel sounds]  Musculoskeletal: [Grossly normal, no tenderness, normal ROM]  Skin: [No rashes, no erythema, no lesions]  Extremities: [Normal inspection.  No edema, no cyanosis, no clubbing.  Normal capillary refill]  Neuro: [Alert, moves all extremities]  Psychiatry: [No anxiety, no depression, nonsuicidal]        Results Reviewed:  I have personally reviewed current lab, radiology, and data and agree with results.  Lab Results (last 24 hours)     Procedure Component Value Units Date/Time    Extra Tubes [[id number]] Collected:  06/20/20 1842    Specimen:  Blood, Venous Line Updated:  06/20/20 1946    Narrative:       The following orders were created for panel order Extra Tubes.  Procedure                               Abnormality         Status                     ---------                               -----------         ------                     Gold Top - [facility][[id number]]                                   Final result                 Please view results for these tests on the individual orders.    Gold Top - [facility] [[id number]] Collected:  06/20/20 1842    Specimen:  Blood Updated:  06/20/20 1946     Extra Tube Hold for add-ons.     Comment: Auto resulted.       Comprehensive Metabolic Panel [[id number]]  (Abnormal) Collected:  06/20/20 1842    Specimen:  Blood  Updated:  06/20/20 1908     Glucose 311 mg/dL      BUN 8 mg/dL      Creatinine 0.83 mg/dL      Sodium 128 mmol/L      Potassium 4.1 mmol/L      Chloride 94 mmol/L      CO2 25.0 mmol/L      Calcium 8.5 mg/dL      Total Protein 7.2 g/dL      Albumin 3.30 g/dL      ALT (SGPT) 8 U/L      AST (SGOT) 16 U/L      Alkaline Phosphatase 97 U/L      Total Bilirubin 0.7 mg/dL      eGFR Non African Amer 88 mL/min/1.73      Globulin 3.9 gm/dL      A/G Ratio 0.8 g/dL      BUN/Creatinine Ratio 9.6     Anion Gap 9.0 mmol/L     Narrative:       GFR Normal >60  Chronic Kidney Disease <60  Kidney Failure <15      Magnesium [[id number]]  (Normal) Collected:  06/20/20 1842    Specimen:  Blood Updated:  06/20/20 1908     Magnesium 2.1 mg/dL     Blood Gas, Arterial [[id number]]  (Abnormal) Collected:  06/20/20 1859    Specimen:  Arterial Blood Updated:  06/20/20 1905     Site Left Radial     Ammon's Test N/A     pH, Arterial 7.460 pH units      Comment: 83 Value above reference range        pCO2, Arterial 35.6 mm Hg      pO2, Arterial 71.0 mm Hg      Comment: 84 Value below reference range        HCO3, Arterial 25.3 mmol/L      Base Excess, Arterial 1.6 mmol/L      O2 Saturation, Arterial 95.7 %      Barometric Pressure for Blood Gas 745 mmHg      Modality Room Air     Ventilator Mode NA     Collected by MITCHELL LOPEZ     Comment: Meter: [id number]     :  [id number]       Protime-INR [[id number]]  (Abnormal) Collected:  06/20/20 1842    Specimen:  Blood Updated:  06/20/20 1904     Protime 27.7 Seconds      INR 2.61    Narrative:       Therapeutic range for most indications is 2.0-3.0 INR,  or 2.5-3.5 for mechanical heart valves.    Urinalysis, Microscopic Only - Urine, Catheter In/Out [[id number]]  (Abnormal) Collected:  06/20/20 1841    Specimen:  Urine, Catheter In/Out Updated:  06/20/20 1859     RBC, UA Too Numerous to Count /HPF      WBC, UA Too Numerous to Count /HPF      Bacteria, UA Trace /HPF      Squamous Epithelial Cells, UA  None Seen /HPF      Hyaline Casts, UA None Seen /LPF      Methodology Manual Light Microscopy    Urine Culture - Urine, Urine, Catheter In/Out [[id number]] Collected:  06/20/20 1841    Specimen:  Urine, Catheter In/Out Updated:  06/20/20 1859    Urinalysis With Culture If Indicated - Urine, Catheter In/Out [[id number]]  (Abnormal) Collected:  06/20/20 1841    Specimen:  Urine, Catheter In/Out Updated:  06/20/20 1851     Color, UA Yellow     Appearance, UA Turbid     pH, UA 5.5     Specific Gravity, UA 1.020     Glucose, UA >=1000 mg/dL (3+)     Ketones, UA Trace     Bilirubin, UA Negative     Blood, UA Large (3+)     Protein, UA 30 mg/dL (1+)     Leuk Esterase, UA Large (3+)     Nitrite, UA Negative     Urobilinogen, UA 0.2 E.U./dL    CBC & Differential [[id number]] Collected:  06/20/20 1842    Specimen:  Blood Updated:  06/20/20 1847    Narrative:       The following orders were created for panel order CBC & Differential.  Procedure                               Abnormality         Status                     ---------                               -----------         ------                     CBC Auto Differential[[id number]]        Abnormal            Final result                 Please view results for these tests on the individual orders.    CBC Auto Differential [[id number]]  (Abnormal) Collected:  06/20/20 1842    Specimen:  Blood Updated:  06/20/20 1847     WBC 8.72 10*3/mm3      RBC 4.38 10*6/mm3      Hemoglobin 12.7 g/dL      Hematocrit 38.6 %      MCV 88.1 fL      MCH 29.0 pg      MCHC 32.9 g/dL      RDW 13.8 %      RDW-SD 44.2 fl      MPV 8.9 fL      Platelets 276 10*3/mm3      Neutrophil % 83.8 %      Lymphocyte % 9.1 %      Monocyte % 5.6 %      Eosinophil % 0.6 %      Basophil % 0.6 %      Immature Grans % 0.3 %      Neutrophils, Absolute 7.31 10*3/mm3      Lymphocytes, Absolute 0.79 10*3/mm3      Monocytes, Absolute 0.49 10*3/mm3      Eosinophils, Absolute 0.05 10*3/mm3      Basophils, Absolute 0.05  10*3/mm3      Immature Grans, Absolute 0.03 10*3/mm3      nRBC 0.0 /100 WBC         Imaging Results (Last 24 Hours)     Procedure Component Value Units Date/Time    CT Head Without Contrast [[id number]] Collected:  06/20/20 2221     Updated:  06/20/20 2243    Narrative:         CT head without contrast on 6/20/2020     CLINICAL INDICATION: Altered mental status    TECHNIQUE:  Multiple axial images are obtained throughout the  head without the administration of contrast.  This exam was  performed according to our departmental dose-optimization  program, which includes automated exposure control, adjustment of  the mA and/or kV according to patient size and/or use of  iterative reconstruction technique.  Total DLP is 1075.4 mGy*cm.    COMPARISON: 3/7/2019    FINDINGS:  There is generalized cerebral atrophy. There is low  density in the periventricular white matter consistent with  chronic small vessel ischemic changes. There is no hydrocephalus.  There is no hemorrhage. There are no abnormal extra-axial fluid  collections. There is no mass, mass effect or midline shift.  There is no CT evidence of acute infarct. No bony abnormality is  noted.      Impression:       Atrophy and chronic small vessel ischemic changes  with no acute intracranial abnormality.    Electronically signed by:  Delgado Bess  6/20/2020 10:42 PM  CDT Workstation: [id number]    XR Chest 1 View [[id number]] Collected:  06/20/20 1825     Updated:  06/20/20 2012    Narrative:       PROCEDURE: XR CHEST 1 VW    VIEWS:Single    INDICATION: Altered mental status    COMPARISON: CXR: 3/6/2019    FINDINGS:       - lines/tubes: None    - cardiac: Size within normal limits.    - mediastinum: Contour within normal limits. Atherosclerotic  vascular calcification    - lungs: Low lung volumes. Streaky bibasilar opacities may  represent atelectasis and/or infiltrate..     - pleura: No evidence of  fluid.      - osseous: Unremarkable for age.        Impression:          Low lung volumes with streaky bibasilar atelectasis and/or  infiltrate      Electronically signed by:  Elyssa Montes MD  6/20/2020 8:11 PM CDT  Workstation: [id number]            Assessment:    Active Hospital Problems    Diagnosis   • Acute cystitis with hematuria             Plan:    85-year-old gentleman with past medical history significant for DVT, PE, CVA, COPD, asthma, anxiety who presented to the hospital with worsening confusion possibly secondary to untreated urinary tract infection.    Acute encephalopathy:  Possibly secondary to UTI  CT head negative  Admit to the [facility] bed  Close vitals monitoring  Urine cultures in process  Initiated the patient on IV Zosyn pharmacy to dose  Hold CNS depressing medications    History of DVT/PE:  Hold Coumadin for the time being given was reported to have slight hematuria in the ER  If hematuria resolved, then resume coumadin    Hypercholesterolemia:  Continue statin    BPH:  Continue Flomax    Depression/anxiety:  Continue Celexa            Patient is full code  Estimated length of stay greater than 2 midnights  DVT prophylaxis SCDs          I discussed the patient's findings and my recommendations with: Patient    Jennifer Hernandez MD

## 2020-06-21 NOTE — PLAN OF CARE
Pt admitted this shift. VSS.Susan-area appears grossly excoriated. Bloody drainage in brief. Admitted this shift with cystitis.

## 2020-06-21 NOTE — ED NOTES
Report received from ALEKSANDRA Angel. Pt  Alert but  confused, daughter at bedside.      Elvira Shipley RN  06/20/20 [id number]

## 2020-06-21 NOTE — PROGRESS NOTES
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    Home regimen: warfarin 1 mg nightly  INR Goal: 2-3    Last INR:   Lab Results   Component Value Date    INR 2.47 (H) 06/21/2020       Objective:  [Ht: 172.7 cm (67.99\"); Wt: 70.4 kg (155 lb 4.8 oz)]  Lab Results   Component Value Date    INR 2.47 (H) 06/21/2020    INR 2.61 (H) 06/20/2020    INR 7.4 (C) 10/16/2019    PROTIME 26.5 (H) 06/21/2020    PROTIME 27.7 (H) 06/20/2020    PROTIME 73.6 (H) 10/16/2019     Lab Results   Component Value Date    HGB 13.0 06/21/2020    HGB 12.7 (L) 06/20/2020    HGB 14.6 04/04/2019    HCT 38.2 06/21/2020    HCT 38.6 06/20/2020    HCT 41.2 04/04/2019     06/21/2020     06/20/2020     04/04/2019           Assessment  Interacting medications: aspirin  INR is 2.47, therapeutic.  Held yesterday for hematuria upon admission.  Hematuria resolved and restarting warfarin tonight.  Will continue home regimen, give warfarin 1 mg nightly.  H/H WNL      Plan:  1.  Give warfarin 1 mg tablet PO @ 1800 tonight  2.  Draw a PT/INR in AM  3.  Pharmacy will continue to follow    Delgado Aponte [facility]  06/21/20 13:58     "

## 2020-06-22 LAB
AMMONIA BLD-SCNC: <10 UMOL/L (ref 16–60)
ANION GAP SERPL CALCULATED.3IONS-SCNC: 12 MMOL/L (ref 5–15)
BUN BLD-MCNC: 10 MG/DL (ref 8–23)
BUN/CREAT SERPL: 13.5 (ref 7–25)
CALCIUM SPEC-SCNC: 8.3 MG/DL (ref 8.6–10.5)
CHLORIDE SERPL-SCNC: 98 MMOL/L (ref 98–107)
CO2 SERPL-SCNC: 24 MMOL/L (ref 22–29)
CREAT BLD-MCNC: 0.74 MG/DL (ref 0.76–1.27)
DEPRECATED RDW RBC AUTO: 45.8 FL (ref 37–54)
ERYTHROCYTE [DISTWIDTH] IN BLOOD BY AUTOMATED COUNT: 14.1 % (ref 12.3–15.4)
GFR SERPL CREATININE-BSD FRML MDRD: 101 ML/MIN/1.73
GLUCOSE BLD-MCNC: 140 MG/DL (ref 65–99)
GLUCOSE BLDC GLUCOMTR-MCNC: 110 MG/DL (ref 70–130)
GLUCOSE BLDC GLUCOMTR-MCNC: 151 MG/DL (ref 70–130)
GLUCOSE BLDC GLUCOMTR-MCNC: 199 MG/DL (ref 70–130)
HCT VFR BLD AUTO: 37.6 % (ref 37.5–51)
HGB BLD-MCNC: 12.4 G/DL (ref 13–17.7)
HOLD SPECIMEN: NORMAL
INR PPP: 2.6 (ref 0.8–1.2)
MCH RBC QN AUTO: 29.5 PG (ref 26.6–33)
MCHC RBC AUTO-ENTMCNC: 33 G/DL (ref 31.5–35.7)
MCV RBC AUTO: 89.3 FL (ref 79–97)
PLATELET # BLD AUTO: 304 10*3/MM3 (ref 140–450)
PMV BLD AUTO: 9.2 FL (ref 6–12)
POTASSIUM BLD-SCNC: 3.3 MMOL/L (ref 3.5–5.2)
PROTHROMBIN TIME: 27.6 SECONDS (ref 11.1–15.3)
RBC # BLD AUTO: 4.21 10*6/MM3 (ref 4.14–5.8)
SODIUM BLD-SCNC: 134 MMOL/L (ref 136–145)
WBC NRBC COR # BLD: 7.71 10*3/MM3 (ref 3.4–10.8)
WHOLE BLOOD HOLD SPECIMEN: NORMAL

## 2020-06-22 PROCEDURE — G0378 HOSPITAL OBSERVATION PER HR: HCPCS

## 2020-06-22 PROCEDURE — 63710000001 INSULIN ASPART PER 5 UNITS: Performed by: NURSE PRACTITIONER

## 2020-06-22 PROCEDURE — 85610 PROTHROMBIN TIME: CPT | Performed by: NURSE PRACTITIONER

## 2020-06-22 PROCEDURE — 25010000002 PIPERACILLIN SOD-TAZOBACTAM PER 1 G: Performed by: INTERNAL MEDICINE

## 2020-06-22 PROCEDURE — 85027 COMPLETE CBC AUTOMATED: CPT | Performed by: NURSE PRACTITIONER

## 2020-06-22 PROCEDURE — 82140 ASSAY OF AMMONIA: CPT | Performed by: NURSE PRACTITIONER

## 2020-06-22 PROCEDURE — 25010000002 FLUCONAZOLE PER 200 MG: Performed by: NURSE PRACTITIONER

## 2020-06-22 PROCEDURE — 87040 BLOOD CULTURE FOR BACTERIA: CPT | Performed by: NURSE PRACTITIONER

## 2020-06-22 PROCEDURE — 82962 GLUCOSE BLOOD TEST: CPT

## 2020-06-22 PROCEDURE — 97162 PT EVAL MOD COMPLEX 30 MIN: CPT

## 2020-06-22 PROCEDURE — 80048 BASIC METABOLIC PNL TOTAL CA: CPT | Performed by: NURSE PRACTITIONER

## 2020-06-22 PROCEDURE — 97166 OT EVAL MOD COMPLEX 45 MIN: CPT

## 2020-06-22 RX ORDER — WARFARIN SODIUM 1 MG/1
1 TABLET ORAL
Status: DISCONTINUED | OUTPATIENT
Start: 2020-06-22 | End: 2020-06-22

## 2020-06-22 RX ORDER — NICOTINE POLACRILEX 4 MG
15 LOZENGE BUCCAL
Status: DISCONTINUED | OUTPATIENT
Start: 2020-06-22 | End: 2020-06-23 | Stop reason: HOSPADM

## 2020-06-22 RX ORDER — DEXTROSE MONOHYDRATE 25 G/50ML
25 INJECTION, SOLUTION INTRAVENOUS
Status: DISCONTINUED | OUTPATIENT
Start: 2020-06-22 | End: 2020-06-23 | Stop reason: HOSPADM

## 2020-06-22 RX ORDER — FLUCONAZOLE 2 MG/ML
200 INJECTION, SOLUTION INTRAVENOUS DAILY
Status: DISCONTINUED | OUTPATIENT
Start: 2020-06-22 | End: 2020-06-23 | Stop reason: HOSPADM

## 2020-06-22 RX ORDER — NICOTINE 21 MG/24HR
1 PATCH, TRANSDERMAL 24 HOURS TRANSDERMAL
Status: DISCONTINUED | OUTPATIENT
Start: 2020-06-22 | End: 2020-06-23 | Stop reason: HOSPADM

## 2020-06-22 RX ORDER — WARFARIN SODIUM 1 MG
0.5 TABLET ORAL
Status: DISCONTINUED | OUTPATIENT
Start: 2020-06-22 | End: 2020-06-23 | Stop reason: HOSPADM

## 2020-06-22 RX ADMIN — FAMOTIDINE 40 MG: 40 TABLET, FILM COATED ORAL at 08:44

## 2020-06-22 RX ADMIN — PIPERACILLIN SODIUM AND TAZOBACTAM SODIUM 3.38 G: 3; .375 INJECTION, POWDER, LYOPHILIZED, FOR SOLUTION INTRAVENOUS at 11:50

## 2020-06-22 RX ADMIN — HYDROCORTISONE: 1 CREAM TOPICAL at 21:57

## 2020-06-22 RX ADMIN — NICOTINE 1 PATCH: 21 PATCH, EXTENDED RELEASE TRANSDERMAL at 11:48

## 2020-06-22 RX ADMIN — INSULIN ASPART 2 UNITS: 100 INJECTION, SOLUTION INTRAVENOUS; SUBCUTANEOUS at 11:48

## 2020-06-22 RX ADMIN — ACETAMINOPHEN 650 MG: 325 TABLET, FILM COATED ORAL at 13:38

## 2020-06-22 RX ADMIN — PIPERACILLIN SODIUM AND TAZOBACTAM SODIUM 3.38 G: 3; .375 INJECTION, POWDER, LYOPHILIZED, FOR SOLUTION INTRAVENOUS at 04:20

## 2020-06-22 RX ADMIN — NYSTATIN: 100000 POWDER TOPICAL at 21:57

## 2020-06-22 RX ADMIN — ASPIRIN 81 MG: 81 TABLET, COATED ORAL at 08:44

## 2020-06-22 RX ADMIN — TAMSULOSIN HYDROCHLORIDE 0.4 MG: 0.4 CAPSULE ORAL at 21:57

## 2020-06-22 RX ADMIN — ATORVASTATIN CALCIUM 10 MG: 10 TABLET, FILM COATED ORAL at 21:57

## 2020-06-22 RX ADMIN — SODIUM CHLORIDE 50 ML/HR: 9 INJECTION, SOLUTION INTRAVENOUS at 17:36

## 2020-06-22 RX ADMIN — CITALOPRAM HYDROBROMIDE 20 MG: 20 TABLET ORAL at 08:44

## 2020-06-22 RX ADMIN — SODIUM CHLORIDE, PRESERVATIVE FREE 10 ML: 5 INJECTION INTRAVENOUS at 21:57

## 2020-06-22 RX ADMIN — HYDROCORTISONE: 1 CREAM TOPICAL at 08:44

## 2020-06-22 RX ADMIN — NYSTATIN: 100000 POWDER TOPICAL at 08:44

## 2020-06-22 RX ADMIN — FLUCONAZOLE IN SODIUM CHLORIDE 200 MG: 2 INJECTION, SOLUTION INTRAVENOUS at 17:35

## 2020-06-22 RX ADMIN — WARFARIN SODIUM 0.5 MG: 1 TABLET ORAL at 17:35

## 2020-06-22 NOTE — PLAN OF CARE
Problem: Patient Care Overview  Goal: Plan of Care Review  Outcome: Ongoing (interventions implemented as appropriate)  Flowsheets (Taken 6/22/2020 0139)  Plan of Care Reviewed With: patient  Outcome Summary: OT Eval completed as co-eval with PT. Pt pleasant and agreeable to therapy. Pt has residual deficits present from previous stroke. Pt demos dysarthria, drop foot on L foot, and L flexed contracture on LUE. Pt is very rigid on L side. Pt also a poor historian and [place]. Bed Mobility: Max A for Sup<>Sit<>Sup Transfers: Mod A for sit<>stand LBD: Max/Dependent for don/doff socks Toileting: Dependent. Pt demos decreased activity tolerance, decreased strength, decreased balance, decreased ROM, decreased safety awareness, and decreased cognition. Pt would benefit from continued skilled OT to address functional deficits. Recommend d/c home with HHOT and 24/7 supervision/assist.

## 2020-06-22 NOTE — THERAPY EVALUATION
Acute Care - Occupational Therapy Initial Evaluation  [facility]     Patient Name: Arnoldo Guzman  : 1934  MRN: [id number]  Today's Date: 2020  Onset of Illness/Injury or Date of Surgery: 20  Date of Referral to OT: 20  Referring Physician: KRISTINA Muñoz    Admit Date: 2020       ICD-10-CM ICD-9-CM   1. Acute cystitis with hematuria N30.01 595.0   2. Failure of outpatient treatment Z78.9 V49.89   3. Altered mental status, unspecified altered mental status type R41.82 780.97   4. Impaired mobility and activities of daily living Z74.09 V49.89    Z78.9      Patient Active Problem List   Diagnosis   • Phimosis   • Sepsis secondary to UTI (CMS/HCC)   • Acute UTI (urinary tract infection)   • Encephalopathy, metabolic   • Hyponatremia   • Type 2 diabetes mellitus (CMS/HCC)   • History of DVT (deep vein thrombosis)   • Essential hypertension   • Hypokalemia   • Acute cystitis with hematuria     Past Medical History:   Diagnosis Date   • Anxiety    • Asthma    • Backache     Lumbarsacral radiculopathy      • Cataract    • Cerebrovascular accident (CMS/HCC)     R thalamic w L side residual hemiparesis      • Cerebrovascular disease    • Cervical spondylosis     C5-6,C6-C7 with traction spurs at L2and L3   • Chronic sinusitis    • Coal workers' pneumoconiosis (CMS/HCC)    • COPD (chronic obstructive pulmonary disease) (CMS/HCC)    • Deep venous thrombosis of lower extremity (CMS/HCC)     RECURRENT   • Degeneration of lumbar intervertebral disc    • Depression    • Diabetes mellitus (CMS/HCC)     Hypoglycemic state in diabetes      • Dysphagia    • Dyspnea    • Edema of leg    • Hypertension    • Hypokalemia    • Infarction of lung due to iatrogenic pulmonary embolism (CMS/HCC)     BILATERAL   • Muscle weakness     RESIDUAL LEFT-SIDED   • Neck pain    • On anticoagulant therapy    • Pain in elbow    • Pseudophakia    • Psoriasis    • S/P insertion of IVC (inferior vena caval) filter  01/29/2010    Geoff pul emboli   • Urinary incontinence      Past Surgical History:   Procedure Laterality Date   • CIRCUMCISION     • ENDOSCOPY  01/19/2001    Marked strictured region with mucosa at the gastroesophageal junction. 530.3   • ENDOSCOPY N/A 7/5/2019    Procedure: ESOPHAGOGASTRODUODENOSCOPY;  Surgeon: Eliezer Dillon DO;  Location: [facility] ENDOSCOPY;  Service: Gastroenterology   • EYE SURGERY Right 03/24/1999    CATARACT REMOVAL W/LENS IMPLANT   • PHALLOPLASTY, CIRCUMCISION N/A 2/27/2019    Procedure: CIRCUMCISION;  Surgeon: Raza Zayas MD;  Location: [facility] OR;  Service: Urology          OT ASSESSMENT FLOWSHEET (last 12 hours)      Occupational Therapy Evaluation     Row Name 06/22/20 1421                   OT Evaluation Time/Intention    Subjective Information  complains of;pain  -        Document Type  evaluation  -        Mode of Treatment  co-treatment;occupational therapy;physical therapy  -        Patient Effort  fair  -           General Information    Patient Profile Reviewed?  yes  -        Onset of Illness/Injury or Date of Surgery  06/20/20  -        Referring Physician  KRISTINA Muñoz  -        Patient Observations  alert;cooperative;agree to therapy  -        Patient/Family Observations  No family present  -        General Observations of Patient  Fowlers in bed  -        Prior Level of Function  independent:;transfer;w/c or scooter;mod assist:;ADL's  -        Equipment Currently Used at Home  wheelchair;walker, rolling;wheelchair, motorized  -        Existing Precautions/Restrictions  fall  -        Limitations/Impairments  hearing  -        Risks Reviewed  patient:;LOB;nausea/vomiting;increased discomfort;change in vital signs;dizziness;increased drainage;lines disloged  -        Benefits Reviewed  patient:;increase independence;improve function;increase strength;increase balance;decrease pain;decrease risk of DVT;improve skin integrity;increase  knowledge  -           Relationship/Environment    Primary Source of Support/Comfort  child(ana paula)  -        Lives With  spouse  -           Resource/Environmental Concerns    Current Living Arrangements  home/apartment/condo  -        Resource/Environmental Concerns  none  -           Cognitive Assessment/Interventions    Additional Documentation  Cognitive Assessment/Intervention (Group)  -           Cognitive Assessment/Intervention- PT/OT    Affect/Mental Status (Cognitive)  WFL;confused  -        Orientation Status (Cognition)  oriented to;person;place  -        Follows Commands (Cognition)  WFL  -        Cognitive Function (Cognitive)  memory deficit;safety deficit  -        Memory Deficit (Cognitive)  moderate deficit  -        Safety Deficit (Cognitive)  moderate deficit  -           Safety Issues, Functional Mobility    Safety Issues Affecting Function (Mobility)  ability to follow commands;awareness of need for assistance;insight into deficits/self awareness;safety precaution awareness;safety precautions follow-through/compliance  -        Impairments Affecting Function (Mobility)  balance;cognition;endurance/activity tolerance;strength;range of motion (ROM);pain;postural/trunk control  -           Bed Mobility Assessment/Treatment    Bed Mobility Assessment/Treatment  supine-sit;sit-supine  -        Supine-Sit [place] (Bed Mobility)  maximum assist (25% patient effort);1 person assist  -        Sit-Supine [place] (Bed Mobility)  maximum assist (25% patient effort);1 person assist  -        Bed Mobility, Safety Issues  decreased use of arms for pushing/pulling;decreased use of legs for bridging/pushing;impaired trunk control for bed mobility;cognitive deficits limit understanding  -           Transfer Assessment/Treatment    Transfer Assessment/Treatment  sit-stand transfer;stand-sit transfer  -           Sit-Stand Transfer    Sit-Stand [place] (Transfers)   moderate assist (50% patient effort)  -           Stand-Sit Transfer    Stand-Sit [place] (Transfers)  moderate assist (50% patient effort)  -           ADL Assessment/Intervention    BADL Assessment/Intervention  lower body dressing;toileting  -           Lower Body Dressing Assessment/Training    Lower Body Dressing [place] Level  doff;don;socks;dependent (less than 25% patient effort)  -        Lower Body Dressing Position  supine  -           Toileting Assessment/Training    [place] Level (Toileting)  toileting skills;maximum assist (25% patient effort);dependent (less than 25% patient effort)  -        Toileting Position  supine  -           BADL Safety/Performance    Impairments, BADL Safety/Performance  balance;cognition;endurance/activity tolerance;trunk/postural control;strength;pain;motor control;coordination  -           General ROM    GENERAL ROM COMMENTS  LUE ROM Impaired - Digits flexed, wrist flexed, elbow flexed, shoulder internally rotated (flexed contracture position) RUE- WFL  -           MMT (Manual Muscle Testing)    General MMT Comments  LUE 1/5 Grossly, RUE 4-/5 Grossly  -           Sensory Assessment/Intervention    Sensory General Assessment  no sensation deficits identified  -        Additional Documentation  Hearing Assessment (Group)  -           Hearing Assessment    Hearing Status  hearing impairment, bilaterally  -           Positioning and Restraints    Pre-Treatment Position  in bed  -        Post Treatment Position  bed  -        In Bed  supine;call light within reach;encouraged to call for assist;exit alarm on;side rails up x2;[place] [facility]  -           Pain Assessment    Additional Documentation  Pain Scale: Numbers Pre/Post-Treatment (Group)  -           Pain Scale: Numbers Pre/Post-Treatment    Pain Scale: Numbers, Pretreatment  0/10 - no pain  -        Pain Scale: Numbers, Post-Treatment  0/10 - no pain  -           Wound  06/21/20 0051 Right anterior;upper;medial arm Puncture    Wound - Properties Group Date first assessed: 06/21/20  -TC Time first assessed: 0051  -TC Present on Hospital Admission: Y  -TC Side: Right  -TC Orientation: anterior;upper;medial  -TC Location: arm  -TC Primary Wound Type: Puncture  -TC, x2        Plan of Care Review    Plan of Care Reviewed With  patient  -           Clinical Impression (OT)    Date of Referral to OT  06/22/20  -        OT Diagnosis  Impaired Mobility and ADL's  -        Criteria for Skilled Therapeutic Interventions Met (OT Eval)  yes;treatment indicated  -        Rehab Potential (OT Eval)  good, to achieve stated therapy goals  -        Therapy Frequency (OT Eval)  other (see comments) 3-5 days per week  -        Predicted Duration of Therapy Intervention (Therapy Eval)  Until goals met or d/c  -        Care Plan Review (OT)  evaluation/treatment results reviewed;care plan/treatment goals reviewed;current/potential barriers reviewed;risks/benefits reviewed;patient/other agree to care plan  -        Anticipated Discharge Disposition (OT)  home with home health;home with 24/7 [facility]  -           Vital Signs    Pre Systolic BP Rehab  113  -JH        Pre Treatment Diastolic BP  78  -JH        Post Systolic BP Rehab  139  -JH        Post Treatment Diastolic BP  83  -JH        Pretreatment Heart Rate (beats/min)  70  -JH        Posttreatment Heart Rate (beats/min)  83  -JH        Pre SpO2 (%)  95  -JH        O2 Delivery Pre Treatment  room air  -        Post SpO2 (%)  97  -JH        O2 Delivery Post Treatment  room air  -        Pre Patient Position  Supine  -        Post Patient Position  Supine  -           Planned OT Interventions    Planned Therapy Interventions (OT Eval)  activity tolerance training;adaptive equipment training;BADL retraining;transfer/mobility retraining;strengthening exercise;ROM/therapeutic exercise;patient/caregiver  education/training;occupation/activity based interventions;neuromuscular control/coordination retraining;functional balance retraining  -           OT Goals    Bed Mobility Goal Selection (OT)  bed mobility, OT goal 1  -JH        Transfer Goal Selection (OT)  transfer, OT goal 1  -JH        Bathing Goal Selection (OT)  bathing, OT goal 1  -JH        Dressing Goal Selection (OT)  dressing, OT goal 1  -JH        Toileting Goal Selection (OT)  toileting, OT goal 1  -JH        Grooming Goal Selection (OT)  grooming, OT goal 1  -JH        Activity Tolerance Goal Selection (OT)  activity tolerance, OT goal 1  -JH        Additional Documentation  Activity Tolerance Goal Selection (OT) (Row);Grooming Goal Selection (OT) (Row)  -           Bed Mobility Goal 1 (OT)    Activity/Assistive Device (Bed Mobility Goal 1, OT)  sit to supine/supine to sit  -        [place] Level/Cues Needed (Bed Mobility Goal 1, OT)  minimum assist (75% or more patient effort);verbal cues required;tactile cues required;set-up required  -        Time Frame (Bed Mobility Goal 1, OT)  long term goal (LTG);by discharge  -        Progress/Outcomes (Bed Mobility Goal 1, OT)  goal not met  -           Transfer Goal 1 (OT)    Activity/Assistive Device (Transfer Goal 1, OT)  bed-to-chair/chair-to-bed  -        [place] Level/Cues Needed (Transfer Goal 1, OT)  moderate assist (50-74% patient effort);verbal cues required;tactile cues required;set-up required  -        Time Frame (Transfer Goal 1, OT)  long term goal (LTG);by discharge  -        Progress/Outcome (Transfer Goal 1, OT)  goal not met  -           Bathing Goal 1 (OT)    Activity/Assistive Device (Bathing Goal 1, OT)  upper body bathing  -        [place] Level/Cues Needed (Bathing Goal 1, OT)  minimum assist (75% or more patient effort);verbal cues required;tactile cues required;set-up required  -        Time Frame (Bathing Goal 1, OT)  long term goal (LTG);by  discharge  -        Progress/Outcomes (Bathing Goal 1, OT)  goal not met  -JH           Dressing Goal 1 (OT)    Activity/Assistive Device (Dressing Goal 1, OT)  upper body dressing  -JH        Lane/Cues Needed (Dressing Goal 1, OT)  minimum assist (75% or more patient effort);verbal cues required;tactile cues required;set-up required  -JH        Time Frame (Dressing Goal 1, OT)  long term goal (LTG);by discharge  -        Progress/Outcome (Dressing Goal 1, OT)  goal not met  -JH           Toileting Goal 1 (OT)    Activity/Device (Toileting Goal 1, OT)  toileting skills, all  -JH        Lane Level/Cues Needed (Toileting Goal 1, OT)  minimum assist (75% or more patient effort);verbal cues required;tactile cues required;set-up required  -        Time Frame (Toileting Goal 1, OT)  long term goal (LTG);by discharge  -        Progress/Outcome (Toileting Goal 1, OT)  goal not met  -JH           Grooming Goal 1 (OT)    Activity/Device (Grooming Goal 1, OT)  grooming skills, all  -JH        Lane (Grooming Goal 1, OT)  minimum assist (75% or more patient effort);tactile cues required;verbal cues required;set-up required  -        Time Frame (Grooming Goal 1, OT)  long term goal (LTG);by discharge  -        Progress/Outcome (Grooming Goal 1, OT)  goal not met  -            Activity Tolerance Goal 1 (OT)    Activity Level (Endurance Goal 1, OT)  -- 15 min functional activity with proper EC  -        Time Frame (Activity Tolerance Goal 1, OT)  long term goal (LTG);by discharge  -        Progress/Outcome (Activity Tolerance Goal 1, OT)  goal not met  -JH           OT Cognitive Goals    Orientation Goal Selection (OT)  orientation, OT goal 1  -JH           Orientation Goal 1 (OT)    Activity (Orientation Goal 1, OT)  oriented x 4  -JH        Lane/Cues/Accuracy (Orientation Goal 1, OT)  with 90% accuracy  -JH        Time Frame (Orientation Goal 1, OT)  long term goal (LTG);by  discharge  -        Progress/Outcome (Orientation Goal 1, OT)  goal not met  -           Living Environment    Home Accessibility  tub/shower is not walk in  -          User Key  (r) = Recorded By, (t) = Taken By, (c) = Cosigned By    Initials Name Effective Dates    TC Noe Barcenas Jr., RN 10/17/16 -      Arias Coyle OT 09/10/19 -          Occupational Therapy Education                 Title: PT OT SLP Therapies (In Progress)     Topic: Occupational Therapy (In Progress)     Point: ADL training (Not Started)     Description:   Instruct learner(s) on proper safety adaptation and remediation techniques during self care or transfers.   Instruct in proper use of assistive devices.              Learner Progress:   Not documented in this visit.          Point: Home exercise program (Not Started)     Description:   Instruct learner(s) on appropriate technique for monitoring, assisting and/or progressing therapeutic exercises/activities.              Learner Progress:   Not documented in this visit.          Point: Precautions (Done)     Description:   Instruct learner(s) on prescribed precautions during self-care and functional transfers.              Learning Progress Summary           Patient Acceptance, E, VU,NR by  at 6/22/2020 8522    Comment:  Pt educated on role of OT, d/c recs, and POC                   Point: Body mechanics (Not Started)     Description:   Instruct learner(s) on proper positioning and spine alignment during self-care, functional mobility activities and/or exercises.              Learner Progress:   Not documented in this visit.                      User Key     Initials Effective Dates Name Provider Type Discipline     09/10/19 -  Arias Coyle, OT Occupational Therapist OT                  OT Recommendation and Plan  Outcome Summary/Treatment Plan (OT)  Anticipated Discharge Disposition (OT): home with home health, home with /7 care  Planned Therapy Interventions (OT  Eval): activity tolerance training, adaptive equipment training, BADL retraining, transfer/mobility retraining, strengthening exercise, ROM/therapeutic exercise, patient/caregiver education/training, occupation/activity based interventions, neuromuscular control/coordination retraining, functional balance retraining  Therapy Frequency (OT Eval): other (see comments)(3-5 days per week)  Plan of Care Review  Plan of Care Reviewed With: patient  Plan of Care Reviewed With: patient  Outcome Summary: OT Eval completed as co-eval with PT. Pt pleasant and agreeable to therapy. Pt has residual deficits present from previous stroke. Pt demos dysarthria, drop foot on L foot, and L flexed contracture on LUE. Pt is very rigid on L side. Pt also a poor historian and [place]. Bed Mobility: Max A for Sup<>Sit<>Sup Transfers: Mod A for sit<>stand LBD: Max/Dependent for don/doff socks Toileting: Dependent. Pt demos decreased activity tolerance, decreased strength, decreased balance, decreased ROM, decreased safety awareness, and decreased cognition. Pt would benefit from continued skilled OT to address functional deficits. Recommend d/c home with HHOT and 24/7 supervision/assist.    Outcome Measures     Row Name 06/22/20 6485             How much help from another is currently needed...    Putting on and taking off regular lower body clothing?  1  -JH      Bathing (including washing, rinsing, and drying)  2  -JH      Toileting (which includes using toilet bed pan or urinal)  2  -JH      Putting on and taking off regular upper body clothing  2  -JH      Taking care of personal grooming (such as brushing teeth)  2  -JH      Eating meals  3  -JH      AM-PAC 6 Clicks Score (OT)  12  -         Functional Assessment    Outcome Measure Options  AM-PAC 6 Clicks Daily Activity (OT)  -        User Key  (r) = Recorded By, (t) = Taken By, (c) = Cosigned By    Initials Name Provider Type    Arias Adrian, TITI Occupational Therapist           Time Calculation:   Time Calculation- OT     Row Name 06/22/20 1603             Time Calculation-     OT Start Time  1420  -      OT Stop Time  1458  -      OT Time Calculation (min)  38 min  -      OT Received On  06/22/20  -      OT Goal Re-Cert Due Date  07/05/20  -        User Key  (r) = Recorded By, (t) = Taken By, (c) = Cosigned By    Initials Name Provider Type     Arias Coyle OT Occupational Therapist        Therapy Charges for Today     Code Description Service Date Service Provider Modifiers Qty    [id number]  OT EVAL MOD COMPLEXITY 3 6/22/2020 Arias Coyle OT GO 1               Arias Coyle OT  6/22/2020

## 2020-06-22 NOTE — PROGRESS NOTES
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     Home regimen: warfarin 1 mg nightly  INR Goal: 2-3    Last INR:   Lab Results   Component Value Date    INR 2.60 (H) 06/22/2020       Objective:  [Ht: 172.7 cm (67.99\"); Wt: 73.6 kg (162 lb 3.2 oz)]  Lab Results   Component Value Date    INR 2.60 (H) 06/22/2020    INR 2.47 (H) 06/21/2020    INR 2.61 (H) 06/20/2020    PROTIME 27.6 (H) 06/22/2020    PROTIME 26.5 (H) 06/21/2020    PROTIME 27.7 (H) 06/20/2020     Lab Results   Component Value Date    HGB 12.4 (L) 06/22/2020    HGB 13.0 06/21/2020    HGB 12.7 (L) 06/20/2020    HCT 37.6 06/22/2020    HCT 38.2 06/21/2020    HCT 38.6 06/20/2020     06/22/2020     06/21/2020     06/20/2020           Assessment  Interacting medications: aspirin  INR is 2.6, therapeutic.    Hematuria resolved and restarting warfarin 6/21  Was planning to reorder warfarin 1 mg nightly 6/21 after consult was re-entered, on hold at admission for possible hematuria.  Recorded home dose from ER nurse, but by the time I double checked home regimen, I forgot to input warfarin into Epic after I completed my note.   Patient did not receive a dose of warfarin 6/21, however INR still trended upwards slightly today for unknown reason, no significant interactions at this time.    Will continue home regimen, give warfarin 1 mg nightly.    H/H stable  No bleeding issues/hematuria seen per nursing today     Addendum:  Dose changed to 0.5 mg nightly as patient is starting fluconazole today     Plan:  1.  Give warfarin 0.5mg tablet PO @ 1800 tonight  2.  Draw a PT/INR in AM  3.  Pharmacy will continue to follow    Delgado Aponte [facility]  06/22/20 12:27     "

## 2020-06-22 NOTE — THERAPY EVALUATION
Acute Care - Physical Therapy Initial Evaluation  [facility]     Patient Name: Arnoldo Guzman  : 1934  MRN: [id number]  Today's Date: 2020   Onset of Illness/Injury or Date of Surgery: 20     Referring Physician: KRISTINA Muñoz      Admit Date: 2020    Visit Dx:     ICD-10-CM ICD-9-CM   1. Acute cystitis with hematuria N30.01 595.0   2. Failure of outpatient treatment Z78.9 V49.89   3. Altered mental status, unspecified altered mental status type R41.82 780.97   4. Impaired mobility and activities of daily living Z74.09 V49.89    Z78.9    5. Impaired functional mobility, balance, gait, and endurance Z74.09 V49.89     Patient Active Problem List   Diagnosis   • Phimosis   • Sepsis secondary to UTI (CMS/HCC)   • Acute UTI (urinary tract infection)   • Encephalopathy, metabolic   • Hyponatremia   • Type 2 diabetes mellitus (CMS/HCC)   • History of DVT (deep vein thrombosis)   • Essential hypertension   • Hypokalemia   • Acute cystitis with hematuria     Past Medical History:   Diagnosis Date   • Anxiety    • Asthma    • Backache     Lumbarsacral radiculopathy      • Cataract    • Cerebrovascular accident (CMS/HCC)     R thalamic w L side residual hemiparesis      • Cerebrovascular disease    • Cervical spondylosis     C5-6,C6-C7 with traction spurs at L2and L3   • Chronic sinusitis    • Coal workers' pneumoconiosis (CMS/HCC)    • COPD (chronic obstructive pulmonary disease) (CMS/HCC)    • Deep venous thrombosis of lower extremity (CMS/HCC)     RECURRENT   • Degeneration of lumbar intervertebral disc    • Depression    • Diabetes mellitus (CMS/HCC)     Hypoglycemic state in diabetes      • Dysphagia    • Dyspnea    • Edema of leg    • Hypertension    • Hypokalemia    • Infarction of lung due to iatrogenic pulmonary embolism (CMS/HCC)     BILATERAL   • Muscle weakness     RESIDUAL LEFT-SIDED   • Neck pain    • On anticoagulant therapy    • Pain in elbow    • Pseudophakia    • Psoriasis    •  S/P insertion of IVC (inferior vena caval) filter 01/29/2010    Geoff pul emboli   • Urinary incontinence      Past Surgical History:   Procedure Laterality Date   • CIRCUMCISION     • ENDOSCOPY  01/19/2001    Marked strictured region with mucosa at the gastroesophageal junction. 530.3   • ENDOSCOPY N/A 7/5/2019    Procedure: ESOPHAGOGASTRODUODENOSCOPY;  Surgeon: Eliezer Dillon DO;  Location: [facility] ENDOSCOPY;  Service: Gastroenterology   • EYE SURGERY Right 03/24/1999    CATARACT REMOVAL W/LENS IMPLANT   • PHALLOPLASTY, CIRCUMCISION N/A 2/27/2019    Procedure: CIRCUMCISION;  Surgeon: Raza Zayas MD;  Location: [facility] OR;  Service: Urology        PT ASSESSMENT (last 12 hours)      Physical Therapy Evaluation     Row Name 06/22/20 1420          PT Evaluation Time/Intention    Subjective Information  complains of;pain  -KW     Document Type  evaluation  -KW     Mode of Treatment  co-treatment;physical therapy;occupational therapy  -KW     Patient Effort  fair  -KW     Comment  subjective from pt who is questionable historian, changing answers from beginning of session to end of session  -KW     Row Name 06/22/20 1420          General Information    Patient Profile Reviewed?  yes  -KW     Onset of Illness/Injury or Date of Surgery  06/20/20  -KW     Referring Physician  KRISTINA Muñoz  -KW     Patient Observations  alert;cooperative;agree to therapy  -KW     Patient/Family Observations  no family present  -KW     General Observations of Patient  fowlers in bed, IV, room air, bed alarm  -KW     Prior Level of Function  mod assist:;ADL's;dressing;bathing;min assist:;transfer wife does IADLs  -KW     Equipment Currently Used at Home  wheelchair;walker, rolling  -KW     Pertinent History of Current Functional Problem  Pt is a 84 yo male admitted d/t acute cystitis.   -KW     Existing Precautions/Restrictions  fall  -KW     Limitations/Impairments  hearing  -KW     Risks Reviewed   patient:;LOB;nausea/vomiting;dizziness;increased discomfort;change in vital signs;increased drainage;lines disloged  -KW     Benefits Reviewed  patient:;improve function;increase independence;increase strength;increase balance;decrease pain;improve skin integrity;decrease risk of DVT;increase knowledge  -     Row Name 06/22/20 1420          Relationship/Environment    Lives With  spouse  -[facility] Name 06/22/20 1420          Resource/Environmental Concerns    Current Living Arrangements  home/apartment/condo  -[facility] Name 06/22/20 1420          Living Environment    Home Accessibility  tub/shower is not walk in no stairs in or to enter home  -[facility] Name 06/22/20 1420          Cognitive Assessment/Interventions    Additional Documentation  Cognitive Assessment/Intervention (Group)  -[facility] Name 06/22/20 1420          Cognitive Assessment/Intervention- PT/OT    Affect/Mental Status (Cognitive)  WFL;confused  -KW     Orientation Status (Cognition)  oriented to;person;place  -KW     Follows Commands (Cognition)  WFL  -KW     Personal Safety Interventions  fall prevention program maintained;gait belt;nonskid shoes/slippers when out of bed;supervised activity  -[facility] Name 06/22/20 1420          Safety Issues, Functional Mobility    Safety Issues Affecting Function (Mobility)  ability to follow commands;awareness of need for assistance;impulsivity;insight into deficits/self awareness;judgment;safety precautions follow-through/compliance;safety precaution awareness  -KW     Impairments Affecting Function (Mobility)  balance;cognition;coordination;endurance/activity tolerance;pain;range of motion (ROM);strength;sensation/sensory awareness  -[facility] Name 06/22/20 1420          Bed Mobility Assessment/Treatment    Bed Mobility Assessment/Treatment  supine-sit;sit-supine  -KW     Supine-Sit [place] (Bed Mobility)  maximum assist (25% patient effort)  -KW     Sit-Supine [place] (Bed Mobility)   maximum assist (25% patient effort)  -     Bed Mobility, Safety Issues  decreased use of arms for pushing/pulling;decreased use of legs for bridging/pushing;impaired trunk control for bed mobility  -KW     Row Name 06/22/20 1420          Transfer Assessment/Treatment    Transfer Assessment/Treatment  sit-stand transfer;stand-sit transfer  -     Sit-Stand [place] (Transfers)  moderate assist (50% patient effort)  -     Stand-Sit [place] (Transfers)  moderate assist (50% patient effort)  -KW     Row Name 06/22/20 1420          Gait/Stairs Assessment/Training    Comment (Gait/Stairs)  not attempted at pt's request; pt reporting he does not ambulate at baseline  -KW     Row Name 06/22/20 1420          General ROM    GENERAL ROM COMMENTS  RLE AROM WFL; LLE AAROM WFL   -KW     Row Name 06/22/20 1420          MMT (Manual Muscle Testing)    General MMT Comments  RLE grossly 4-/5; LLE grossly 1/5  -KW     Row Name 06/22/20 1420          Sensory Assessment/Intervention    Sensory General Assessment  no sensation deficits identified BLE light touch assessed  -KW     Row Name 06/22/20 1420          Hearing Assessment    Hearing Status  hearing impairment, bilaterally  -KW     Row Name 06/22/20 1420          Pain Scale: Numbers Pre/Post-Treatment    Pain Scale: Numbers, Pretreatment  0/10 - no pain  -     Pain Scale: Numbers, Post-Treatment  0/10 - no pain  -KW     Row Name             Wound 06/21/20 0051 Right anterior;upper;medial arm Puncture    Wound - Properties Group Date first assessed: 06/21/20  -TC Time first assessed: 0051  -TC Present on Hospital Admission: Y  -TC Side: Right  -TC Orientation: anterior;upper;medial  -TC Location: arm  -TC Primary Wound Type: Puncture  -TC, x2     [facility] Name 06/22/20 1420          Plan of Care Review    Plan of Care Reviewed With  patient  -KW     Row Name 06/22/20 1420          Physical Therapy Clinical Impression    Criteria for Skilled Interventions Met (PT Clinical  Impression)  yes;treatment indicated  -KW     Impairments Found (describe specific impairments)  aerobic capacity/endurance;ROM;motor function;gait, locomotion, and balance  -KW     Rehab Potential (PT Clinical Summary)  fair, will monitor progress closely  -KW     Predicted Duration of Therapy (PT)  until goals met or d/c from acute care  -KW     Care Plan Review (PT)  evaluation/treatment results reviewed;care plan/treatment goals reviewed;risks/benefits reviewed;current/potential barriers reviewed;patient/other agree to care plan  -KW     Row Name 06/22/20 1420          Vital Signs    Pre Systolic BP Rehab  113  -KW     Pre Treatment Diastolic BP  78  -KW     Post Systolic BP Rehab  139  -KW     Post Treatment Diastolic BP  83  -KW     Pretreatment Heart Rate (beats/min)  70  -KW     Posttreatment Heart Rate (beats/min)  83  -KW     Pre SpO2 (%)  95  -KW     O2 Delivery Pre Treatment  room air  -KW     Post SpO2 (%)  97  -KW     O2 Delivery Post Treatment  room air  -KW     Pre Patient Position  Supine  -KW     Row Name 06/22/20 1420          Physical Therapy Goals    Bed Mobility Goal Selection (PT)  bed mobility, PT goal 1  -KW     Transfer Goal Selection (PT)  transfer, PT goal 1  -KW     Gait Training Goal Selection (PT)  --  -KW     Row Name 06/22/20 1420          Bed Mobility Goal 1 (PT)    Activity/Assistive Device (Bed Mobility Goal 1, PT)  sit to supine;supine to sit  -KW     [place] Level/Cues Needed (Bed Mobility Goal 1, PT)  contact guard assist  -KW     Time Frame (Bed Mobility Goal 1, PT)  3 days  -KW     Barriers (Bed Mobility Goal 1, PT)  L sided weakness  -KW     Progress/Outcomes (Bed Mobility Goal 1, PT)  goal not met  -KW     Row Name 06/22/20 1420          Transfer Goal 1 (PT)    Activity/Assistive Device (Transfer Goal 1, PT)  sit-to-stand/stand-to-sit;bed-to-chair/chair-to-bed;walker, rolling;wheelchair transfer  -KW     [place] Level/Cues Needed (Transfer Goal 1, PT)  contact  guard assist  -KW     Time Frame (Transfer Goal 1, PT)  4 days  -KW     Barriers (Transfers Goal 1, PT)  L sided weakness  -KW     Progress/Outcome (Transfer Goal 1, PT)  goal not met  -KW     Row Name 06/22/20 1420          Gait Training Goal 1 (PT)    Activity/Assistive Device (Gait Training Goal 1, PT)  --  -KW     Row Name 06/22/20 1420          Positioning and Restraints    Pre-Treatment Position  in bed  -KW     Post Treatment Position  bed  -KW     In Bed  supine;call light within reach;encouraged to call for assist;exit alarm on;notified nsg;side rails up x2  -KW       User Key  (r) = Recorded By, (t) = Taken By, (c) = Cosigned By    Initials Name Provider Type    Noe Kim Jr., RN Registered Nurse    Denisse Johnson, PT Physical Therapist        Physical Therapy Education                 Title: PT OT SLP Therapies (In Progress)     Topic: Physical Therapy (In Progress)     Point: Mobility training (Done)     Description:   Instruct learner(s) on safety and technique for assisting patient out of bed, chair or wheelchair.  Instruct in the proper use of assistive devices, such as walker, crutches, cane or brace.              Patient Friendly Description:   It's important to get you on your feet again, but we need to do so in a way that is safe for you. Falling has serious consequences, and your personal safety is the most important thing of all.        When it's time to get out of bed, one of us or a family member will sit next to you on the bed to give you support.     If your doctor or nurse tells you to use a walker, crutches, a cane, or a brace, be sure you use it every time you get out of bed, even if you think you don't need it.    Learning Progress Summary           Patient Acceptance, E, VU by BRE at 6/22/2020 9680    Comment:  Role of PT, POC, use of gait belt                   Point: Home exercise program (Not Started)     Description:   Instruct learner(s) on appropriate technique for  monitoring, assisting and/or progressing patient with therapeutic exercises and activities.              Learner Progress:   Not documented in this visit.          Point: Body mechanics (Done)     Description:   Instruct learner(s) on proper positioning and spine alignment for patient and/or caregiver during mobility tasks and/or exercises.              Learning Progress Summary           Patient Acceptance, E, VU by  at 6/22/2020 1626    Comment:  Role of PT, POC, use of gait belt                   Point: Precautions (Done)     Description:   Instruct learner(s) on prescribed precautions during mobility and gait tasks              Learning Progress Summary           Patient Acceptance, E, VU by KW at 6/22/2020 1626    Comment:  Role of PT, POC, use of gait belt                               User Key     Initials Effective Dates Name Provider Type Discipline     07/23/18 -  Denisse More, PT Physical Therapist PT              PT Recommendation and Plan  Anticipated Discharge Disposition (PT): home with 24/7 care, home with home health  Planned Therapy Interventions (PT Eval): balance training, bed mobility training, home exercise program, patient/family education, strengthening, transfer training  Therapy Frequency (PT Clinical Impression): 5 times/wk  Outcome Summary/Treatment Plan (PT)  Anticipated Discharge Disposition (PT): home with 24/7 care, home with home health  Plan of Care Reviewed With: patient  Outcome Summary: PT evaluation completed as co-eval with OT this date. Pt pleasant and agreeable to therapy, but confused at times throughout. Pt with L sided weakness from previous stroke. Pt performing sit<>supine with maxA and sitting EOB with modA initially, but able to perform with CGA after gaining balance. Pt performing sit<>stand with modA. Pt unable to ambulate at baseline, so deferred at this time. Pt would benefit from further skilled PT to increase functional mobility, endurance, strength, and to  progress towards PLOF. Upon d/c from acute care recommend home with 24/7 care and HHPT.  Outcome Measures     Row Name 06/22/20 1421 06/22/20 1420          How much help from another person do you currently need...    Turning from your back to your side while in flat bed without using bedrails?  --  3  -KW     Moving from lying on back to sitting on the side of a flat bed without bedrails?  --  2  -KW     Moving to and from a bed to a chair (including a wheelchair)?  --  1  -KW     Standing up from a chair using your arms (e.g., wheelchair, bedside chair)?  --  2  -KW     Climbing 3-5 steps with a railing?  --  1  -KW     To walk in hospital room?  --  1  -KW     AM-PAC 6 Clicks Score (PT)  --  10  -KW        How much help from another is currently needed...    Putting on and taking off regular lower body clothing?  1  -JH  --     Bathing (including washing, rinsing, and drying)  2  -JH  --     Toileting (which includes using toilet bed pan or urinal)  2  -JH  --     Putting on and taking off regular upper body clothing  2  -JH  --     Taking care of personal grooming (such as brushing teeth)  2  -JH  --     Eating meals  3  -JH  --     AM-PAC 6 Clicks Score (OT)  12  -JH  --        Functional Assessment    Outcome Measure Options  AM-PAC 6 Clicks Daily Activity (OT)  -  AM-PAC 6 Clicks Basic Mobility (PT)  -KW       User Key  (r) = Recorded By, (t) = Taken By, (c) = Cosigned By    Initials Name Provider Type    Denisse Johnson PT Physical Therapist    Arias Adrian, OT Occupational Therapist         Time Calculation:   PT Charges     Row Name 06/22/20 1636             Time Calculation    Start Time  1420  -KW      Stop Time  1458  -KW      Time Calculation (min)  38 min  -KW      PT Received On  06/22/20  -KW      PT Goal Re-Cert Due Date  07/05/20  -KW        User Key  (r) = Recorded By, (t) = Taken By, (c) = Cosigned By    Initials Name Provider Type    Denisse Johnson, KIM Physical Therapist         Therapy Charges for Today     Code Description Service Date Service Provider Modifiers Qty    52910729122 HC PT EVAL MOD COMPLEXITY 3 6/22/2020 Denisse More, PT GP 1          PT G-Codes  Outcome Measure Options: AM-PAC 6 Clicks Daily Activity (OT)  AM-PAC 6 Clicks Score (PT): 10  AM-PAC 6 Clicks Score (OT): 12      Denisse More, PT  6/22/2020

## 2020-06-22 NOTE — PLAN OF CARE
Problem: Patient Care Overview  Goal: Plan of Care Review  Outcome: Ongoing (interventions implemented as appropriate)  Flowsheets (Taken 6/22/2020 5551)  Plan of Care Reviewed With: patient  Outcome Summary: PT evaluation completed as co-eval with OT this date. Pt pleasant and agreeable to therapy, but confused at times throughout. Pt with L sided weakness from previous stroke. Pt performing sit<>supine with maxA and sitting EOB with modA initially, but able to perform with CGA after gaining balance. Pt performing sit<>stand with modA. Pt unable to ambulate at baseline, so deferred at this time. Pt would benefit from further skilled PT to increase functional mobility, endurance, strength, and to progress towards PLOF. Upon d/c from acute care recommend home with 24/7 care and HHPT.

## 2020-06-22 NOTE — PROGRESS NOTES
[facility]  INPATIENT PROGRESS NOTE    Length of Stay: 0  Date of Admission: 6/20/2020  Primary Care Physician: Sony Alvarado MD    Subjective   Chief Complaint: confusion   HPI:  85 year old  male with past medical history of anxiety, vascular dementia, history of CVA, COPD, history of DVT, HTN, history of PE, who presented on 6/20/2020 with altered mental status.  She is currently admitted for acute cystitis that was unresponsive to outpatient treatment with Cipro.  During today's visit, he is awake, alert, but not speaking to provider during exam.     Review of Systems   Unable to perform ROS: Dementia        All pertinent negatives and positives are as above. All other systems have been reviewed and are negative unless otherwise stated.     Objective    Temp:  [97.2 °F (36.2 °C)-98.6 °F (37 °C)] 97.4 °F (36.3 °C)  Heart Rate:  [74-86] 74  Resp:  [16-18] 18  BP: (122-158)/(65-88) 125/70    Physical Exam   Constitutional: Vital signs are normal. He appears well-developed and well-nourished. He is cooperative.   HENT:   Head: Normocephalic and atraumatic.   Right Ear: External ear normal.   Left Ear: External ear normal.   Nose: Nose normal.   Eyes: Conjunctivae are normal. Right eye exhibits no discharge. Left eye exhibits no discharge. No scleral icterus.   Neck: Normal range of motion. Neck supple. No JVD present.   Cardiovascular: Normal rate, regular rhythm, normal heart sounds and intact distal pulses. Exam reveals no gallop and no friction rub.   No murmur heard.  Pulmonary/Chest: Effort normal and breath sounds normal. No stridor. No respiratory distress. He has no wheezes. He has no rales.   Abdominal: Soft. Bowel sounds are normal. He exhibits no distension. There is no tenderness.   Musculoskeletal: Normal range of motion. He exhibits no edema.   Left sided weakness r/t previous stroke.   Neurological: He is alert. He is  disoriented.   Skin: Skin is warm and dry.   Psychiatric: He has a normal mood and affect. His behavior is normal.   Nursing note and vitals reviewed.          Results Review:  I have reviewed the labs, radiology results, and diagnostic studies.    Laboratory Data:   Results from last 7 days   Lab Units 06/22/20  0536 06/21/20  0607 06/20/20  1842   SODIUM mmol/L 134* 129* 128*   POTASSIUM mmol/L 3.3* 3.7 4.1   CHLORIDE mmol/L 98 95* 94*   CO2 mmol/L 24.0 23.0 25.0   BUN mg/dL 10 8 8   CREATININE mg/dL 0.74* 0.74* 0.83   GLUCOSE mg/dL 140* 189* 311*   CALCIUM mg/dL 8.3* 8.7 8.5*   BILIRUBIN mg/dL  --  0.8 0.7   ALK PHOS U/L  --  91 97   ALT (SGPT) U/L  --  9 8   AST (SGOT) U/L  --  22 16   ANION GAP mmol/L 12.0 11.0 9.0     Estimated Creatinine Clearance: 70.3 mL/min (A) (by C-G formula based on SCr of 0.74 mg/dL (L)).  Results from last 7 days   Lab Units 06/21/20  0607 06/20/20  1842   MAGNESIUM mg/dL 2.0 2.1   PHOSPHORUS mg/dL 2.1*  --          Results from last 7 days   Lab Units 06/22/20  0536 06/21/20  0607 06/20/20  1842   WBC 10*3/mm3 7.71 9.66 8.72   HEMOGLOBIN g/dL 12.4* 13.0 12.7*   HEMATOCRIT % 37.6 38.2 38.6   PLATELETS 10*3/mm3 304 288 276     Results from last 7 days   Lab Units 06/22/20  0536 06/21/20  1217 06/20/20  1842   INR  2.60* 2.47* 2.61*       Culture Data:   No results found for: BLOODCX  Urine Culture   Date Value Ref Range Status   06/20/2020 Yeast isolated (A)  Final     No results found for: RESPCX  No results found for: WOUNDCX  No results found for: STOOLCX  No components found for: BODYFLD    Radiology Data:   Imaging Results (Last 24 Hours)     ** No results found for the last 24 hours. **          I have reviewed the patient's current medications.     Assessment/Plan     Active Hospital Problems    Diagnosis   • Acute cystitis with hematuria   • Encephalopathy, metabolic   • Hyponatremia   • History of DVT (deep vein thrombosis)   • Essential hypertension   • Hypokalemia   • Type 2  diabetes mellitus (CMS/HCC)       Plan:   1. Acute metabolic encephalopathy r/t acute cystitis: continue Zosyn and follow urine cultures. Ct head negative aside from small vessel changes (chronic).  Continued altered mental status. Pt/Ot assessment ordered.  Check UDS, ammonia.  2. History of DVT/PE: Warfarin dosing per pharmacy.    3. HLD: continue statin.   4. Anxiety/depression: continue Celexa.   5. BPH: continue Flomax.  6. Hyponatremia: sodium level improved with IV hydration.   7. Hypokalemia: PO replacement ordered.   8. Tobacco abuse: nicotine patch ordered.   9. Type 2 DM: FSBS AC and HS with SSI.     Status update given to patient's daughter, Toyin Martin.  She reports at baseline, patient transfers to scooter chair for mobility, is awake, alert, verbal, oriented for the most part.  Patient remains altered.          This document has been electronically signed by KRISTINA Ennis on June 22, 2020 11:13

## 2020-06-23 VITALS
TEMPERATURE: 97.6 F | RESPIRATION RATE: 20 BRPM | WEIGHT: 168.3 LBS | HEART RATE: 100 BPM | OXYGEN SATURATION: 94 % | HEIGHT: 68 IN | SYSTOLIC BLOOD PRESSURE: 148 MMHG | BODY MASS INDEX: 25.51 KG/M2 | DIASTOLIC BLOOD PRESSURE: 72 MMHG

## 2020-06-23 LAB
ANION GAP SERPL CALCULATED.3IONS-SCNC: 10 MMOL/L (ref 5–15)
BUN BLD-MCNC: 8 MG/DL (ref 8–23)
BUN/CREAT SERPL: 13.8 (ref 7–25)
CALCIUM SPEC-SCNC: 8.1 MG/DL (ref 8.6–10.5)
CHLORIDE SERPL-SCNC: 101 MMOL/L (ref 98–107)
CO2 SERPL-SCNC: 25 MMOL/L (ref 22–29)
CREAT BLD-MCNC: 0.58 MG/DL (ref 0.76–1.27)
DEPRECATED RDW RBC AUTO: 43.8 FL (ref 37–54)
ERYTHROCYTE [DISTWIDTH] IN BLOOD BY AUTOMATED COUNT: 13.7 % (ref 12.3–15.4)
GFR SERPL CREATININE-BSD FRML MDRD: 133 ML/MIN/1.73
GLUCOSE BLD-MCNC: 142 MG/DL (ref 65–99)
GLUCOSE BLDC GLUCOMTR-MCNC: 133 MG/DL (ref 70–130)
HCT VFR BLD AUTO: 33.7 % (ref 37.5–51)
HGB BLD-MCNC: 11.3 G/DL (ref 13–17.7)
INR PPP: 3.43 (ref 0.8–1.2)
MAGNESIUM SERPL-MCNC: 2 MG/DL (ref 1.6–2.4)
MCH RBC QN AUTO: 29.4 PG (ref 26.6–33)
MCHC RBC AUTO-ENTMCNC: 33.5 G/DL (ref 31.5–35.7)
MCV RBC AUTO: 87.8 FL (ref 79–97)
PLATELET # BLD AUTO: 265 10*3/MM3 (ref 140–450)
PMV BLD AUTO: 9 FL (ref 6–12)
POTASSIUM BLD-SCNC: 3.1 MMOL/L (ref 3.5–5.2)
POTASSIUM BLD-SCNC: 3.6 MMOL/L (ref 3.5–5.2)
PROTHROMBIN TIME: 34.3 SECONDS (ref 11.1–15.3)
RBC # BLD AUTO: 3.84 10*6/MM3 (ref 4.14–5.8)
SODIUM BLD-SCNC: 136 MMOL/L (ref 136–145)
WBC NRBC COR # BLD: 6.26 10*3/MM3 (ref 3.4–10.8)

## 2020-06-23 PROCEDURE — 84132 ASSAY OF SERUM POTASSIUM: CPT | Performed by: NURSE PRACTITIONER

## 2020-06-23 PROCEDURE — G0378 HOSPITAL OBSERVATION PER HR: HCPCS

## 2020-06-23 PROCEDURE — 82962 GLUCOSE BLOOD TEST: CPT

## 2020-06-23 PROCEDURE — 83735 ASSAY OF MAGNESIUM: CPT | Performed by: NURSE PRACTITIONER

## 2020-06-23 PROCEDURE — 97110 THERAPEUTIC EXERCISES: CPT

## 2020-06-23 PROCEDURE — 85027 COMPLETE CBC AUTOMATED: CPT | Performed by: NURSE PRACTITIONER

## 2020-06-23 PROCEDURE — 85610 PROTHROMBIN TIME: CPT | Performed by: NURSE PRACTITIONER

## 2020-06-23 PROCEDURE — 80048 BASIC METABOLIC PNL TOTAL CA: CPT | Performed by: NURSE PRACTITIONER

## 2020-06-23 RX ORDER — FLUCONAZOLE 100 MG/1
200 TABLET ORAL DAILY
Qty: 8 TABLET | Refills: 0 | Status: SHIPPED | OUTPATIENT
Start: 2020-06-23 | End: 2020-06-27

## 2020-06-23 RX ORDER — WARFARIN SODIUM 1 MG/1
0.5 TABLET ORAL NIGHTLY
Start: 2020-06-24

## 2020-06-23 RX ORDER — WARFARIN SODIUM 1 MG/1
0.5 TABLET ORAL NIGHTLY
Start: 2020-06-23 | End: 2020-06-23

## 2020-06-23 RX ORDER — POTASSIUM CHLORIDE 750 MG/1
40 CAPSULE, EXTENDED RELEASE ORAL ONCE
Status: COMPLETED | OUTPATIENT
Start: 2020-06-23 | End: 2020-06-23

## 2020-06-23 RX ADMIN — ASPIRIN 81 MG: 81 TABLET, COATED ORAL at 09:05

## 2020-06-23 RX ADMIN — NICOTINE 1 PATCH: 21 PATCH, EXTENDED RELEASE TRANSDERMAL at 09:12

## 2020-06-23 RX ADMIN — MELATONIN 5.25 MG: at 02:06

## 2020-06-23 RX ADMIN — NYSTATIN: 100000 POWDER TOPICAL at 09:04

## 2020-06-23 RX ADMIN — FAMOTIDINE 40 MG: 40 TABLET, FILM COATED ORAL at 09:05

## 2020-06-23 RX ADMIN — HYDROCORTISONE: 1 CREAM TOPICAL at 09:04

## 2020-06-23 RX ADMIN — POTASSIUM CHLORIDE 40 MEQ: 10 CAPSULE, COATED, EXTENDED RELEASE ORAL at 09:09

## 2020-06-23 RX ADMIN — CITALOPRAM HYDROBROMIDE 20 MG: 20 TABLET ORAL at 09:05

## 2020-06-23 NOTE — PLAN OF CARE
Problem: Patient Care Overview  Goal: Plan of Care Review  Outcome: Ongoing (interventions implemented as appropriate)  Flowsheets (Taken 6/23/2020 0111)  Progress: no change  Plan of Care Reviewed With: patient  Note:   Pt remains pleasantly confused. VSS. No further complaints at this time. Will continue to monitor.   Goal: Individualization and Mutuality  Outcome: Ongoing (interventions implemented as appropriate)  Goal: Discharge Needs Assessment  Outcome: Ongoing (interventions implemented as appropriate)  Goal: Interprofessional Rounds/Family Conf  Outcome: Ongoing (interventions implemented as appropriate)     Problem: Fall Risk (Adult)  Goal: Identify Related Risk Factors and Signs and Symptoms  Outcome: Ongoing (interventions implemented as appropriate)  Goal: Absence of Fall  Outcome: Ongoing (interventions implemented as appropriate)     Problem: Skin Injury Risk (Adult)  Goal: Identify Related Risk Factors and Signs and Symptoms  Outcome: Ongoing (interventions implemented as appropriate)  Goal: Skin Health and Integrity  Outcome: Ongoing (interventions implemented as appropriate)     Problem: Urinary Tract Infection (Adult)  Goal: Signs and Symptoms of Listed Potential Problems Will be Absent, Minimized or Managed (Urinary Tract Infection)  Outcome: Ongoing (interventions implemented as appropriate)     Problem: Confusion, Acute (Adult)  Goal: Identify Related Risk Factors and Signs and Symptoms  Outcome: Ongoing (interventions implemented as appropriate)  Goal: Cognitive/Functional Impairments Minimized  Outcome: Ongoing (interventions implemented as appropriate)  Goal: Safety  Outcome: Ongoing (interventions implemented as appropriate)

## 2020-06-23 NOTE — THERAPY TREATMENT NOTE
Acute Care - Occupational Therapy Treatment Note  [facility]     Patient Name: Arnoldo Guzman  : 1934  MRN: [id number]  Today's Date: 2020  Onset of Illness/Injury or Date of Surgery: 20  Date of Referral to OT: 20  Referring Physician: KRISTINA Muñoz    Admit Date: 2020       ICD-10-CM ICD-9-CM   1. Acute cystitis with hematuria N30.01 595.0   2. Failure of outpatient treatment Z78.9 V49.89   3. Altered mental status, unspecified altered mental status type R41.82 780.97   4. Impaired mobility and activities of daily living Z74.09 V49.89    Z78.9    5. Impaired functional mobility, balance, gait, and endurance Z74.09 V49.89     Patient Active Problem List   Diagnosis   • Phimosis   • Sepsis secondary to UTI (CMS/HCC)   • Acute UTI (urinary tract infection)   • Encephalopathy, metabolic   • Hyponatremia   • Type 2 diabetes mellitus (CMS/HCC)   • History of DVT (deep vein thrombosis)   • Essential hypertension   • Hypokalemia   • Acute cystitis with hematuria     Past Medical History:   Diagnosis Date   • Anxiety    • Asthma    • Backache     Lumbarsacral radiculopathy      • Cataract    • Cerebrovascular accident (CMS/HCC)     R thalamic w L side residual hemiparesis      • Cerebrovascular disease    • Cervical spondylosis     C5-6,C6-C7 with traction spurs at L2and L3   • Chronic sinusitis    • Coal workers' pneumoconiosis (CMS/HCC)    • COPD (chronic obstructive pulmonary disease) (CMS/HCC)    • Deep venous thrombosis of lower extremity (CMS/HCC)     RECURRENT   • Degeneration of lumbar intervertebral disc    • Depression    • Diabetes mellitus (CMS/HCC)     Hypoglycemic state in diabetes      • Dysphagia    • Dyspnea    • Edema of leg    • Hypertension    • Hypokalemia    • Infarction of lung due to iatrogenic pulmonary embolism (CMS/HCC)     BILATERAL   • Muscle weakness     RESIDUAL LEFT-SIDED   • Neck pain    • On anticoagulant therapy    • Pain in elbow    • Pseudophakia    •  Psoriasis    • S/P insertion of IVC (inferior vena caval) filter 01/29/2010    Geoff pul emboli   • Urinary incontinence      Past Surgical History:   Procedure Laterality Date   • CIRCUMCISION     • ENDOSCOPY  01/19/2001    Marked strictured region with mucosa at the gastroesophageal junction. 530.3   • ENDOSCOPY N/A 7/5/2019    Procedure: ESOPHAGOGASTRODUODENOSCOPY;  Surgeon: Eliezer Dillon DO;  Location: [facility] ENDOSCOPY;  Service: Gastroenterology   • EYE SURGERY Right 03/24/1999    CATARACT REMOVAL W/LENS IMPLANT   • PHALLOPLASTY, CIRCUMCISION N/A 2/27/2019    Procedure: CIRCUMCISION;  Surgeon: Raza Zayas MD;  Location: [facility] OR;  Service: Urology       Therapy Treatment    Rehabilitation Treatment Summary     Row Name 06/23/20 0827             Treatment Time/Intention    Discipline  occupational therapist  -KO      Document Type  therapy note (daily note)  -KO      Subjective Information  no complaints  -KO      Mode of Treatment  individual therapy  -KO      Patient/Family Observations  no family present  -KO      Care Plan Review  evaluation/treatment results reviewed;care plan/treatment goals reviewed;risks/benefits reviewed;current/potential barriers reviewed;patient/other agree to care plan  -KO      Total Minutes, Occupational Therapy Treatment  25  -KO2      Therapy Frequency (OT Eval)  other (see comments) 3-5x/wk  -KO3      Patient Effort  fair  -KO3      Recorded by [KO] Soco Reese, OT 06/23/20 0839  [KO2] Soco Reese, OT 06/23/20 0858  [KO3] Soco Reese, OT 06/23/20 0849      Row Name 06/23/20 0827             Vital Signs    Pre Systolic BP Rehab  133  -KO      Pre Treatment Diastolic BP  68  -KO      Post Systolic BP Rehab  -- pt refusing BP at end of session  -KO2      Pretreatment Heart Rate (beats/min)  73  -KO      Posttreatment Heart Rate (beats/min)  80  -KO3      Pre SpO2 (%)  95  -KO      O2 Delivery Pre Treatment  room air  -KO      Post SpO2 (%)  94  -KO3      O2  Delivery Post Treatment  room air  -KO3      Pre Patient Position  Supine  -KO2      Post Patient Position  Supine  -KO2      Recorded by [KO] Soco Reese, OT 06/23/20 0839  [KO2] Soco Reese, OT 06/23/20 0858  [KO3] Soco Reese, OT 06/23/20 0849      Row Name 06/23/20 0827             Cognitive Assessment/Intervention- PT/OT    Affect/Mental Status (Cognitive)  confused  -KO      Orientation Status (Cognition)  oriented to;person  -KO      Follows Commands (Cognition)  WFL  -KO      Recorded by [KO] Soco Reese, OT 06/23/20 0849      Row Name 06/23/20 0827             Bed Mobility Assessment/Treatment    Bed Mobility Assessment/Treatment  rolling right  -KO      Rolling Right [place] (Bed Mobility)  maximum assist (25% patient effort)  -KO      Assistive Device (Bed Mobility)  bed rails  -KO      Recorded by [KO] Soco eRese, OT 06/23/20 0858      Row Name 06/23/20 0827             ADL Assessment/Intervention    BADL Assessment/Intervention  feeding  -KO      Recorded by [KO] Soco Reese, OT 06/23/20 0858      Row Name 06/23/20 0827             Self-Feeding Assessment/Training    Comment (Feeding)  Attempted self feeding, however pt taking one bite and throwing flower across the room. Refusing further tx.   -KO      Recorded by [KO] Soco Reese, OT 06/23/20 0858      Row Name 06/23/20 0827             Motor Skills Assessment/Interventions    Additional Documentation  Therapeutic Exercise (Group)  -KO      Recorded by [KO] Soco Reese, OT 06/23/20 0858      Row Name 06/23/20 0827             Therapeutic Exercise    Upper Extremity Range of Motion (Therapeutic Exercise)  shoulder flexion/extension, bilateral;elbow flexion/extension, bilateral;forearm supination/pronation, bilateral;wrist flexion/extension, left  -KO      Hand (Therapeutic Exercise)  finger flexion/extension, left;thumb finger opposition, left  -KO      Exercise Type (Therapeutic Exercise)  AROM (active range of  motion);PROM (passive range of motion) AROM RUE; PROM LUE  -KO      Position (Therapeutic Exercise)  supine  -KO      Sets/Reps (Therapeutic Exercise)  2 x10  -KO      Expected Outcome (Therapeutic Exercise)  improve functional tolerance, self-care activity;increase passive range of motion  -KO      Recorded by [JEROME] Soco Reese, TITI 06/23/20 0858      Row Name 06/23/20 0827             Positioning and Restraints    Pre-Treatment Position  in bed  -KO      Post Treatment Position  bed  -KO      In Bed  fowlers;call light within reach;encouraged to call for assist;exit alarm on  -KO      Recorded by [JEROME] Soco Reese, TITI 06/23/20 0858      Row Name 06/23/20 0827             Pain Scale: Numbers Pre/Post-Treatment    Pain Scale: Numbers, Pretreatment  0/10 - no pain  -KO      Pain Scale: Numbers, Post-Treatment  0/10 - no pain  -KO      Recorded by [JEROME] Soco Reese, TITI 06/23/20 0858      Row Name                Wound 06/21/20 0051 Right anterior;upper;medial arm Puncture    Wound - Properties Group Date first assessed: 06/21/20 [TC] Time first assessed: 0051 [TC] Present on Hospital Admission: Y [TC] Side: Right [TC] Orientation: anterior;upper;medial [TC] Location: arm [TC] Primary Wound Type: Puncture [TC], x2  Recorded by:  [TC] Noe Barcenas Jr., RN 06/21/20 0052    Row Name 06/23/20 0827             Plan of Care Review    Plan of Care Reviewed With  patient  -KO      Recorded by [JEROME] Soco Reese, TITI 06/23/20 0858      Row Name 06/23/20 0827             Outcome Summary/Treatment Plan (OT)    Daily Summary of Progress (OT)  progress towards functional goals is fair  -KO      Plan for Continued Treatment (OT)  continue OT POC  -KO      Anticipated Discharge Disposition (OT)  home with 24/7 care;home with home health if unable to provide 24/7 will need SNF  -KO      Recorded by [JEROME] Soco Reese, TITI 06/23/20 0858        User Key  (r) = Recorded By, (t) = Taken By, (c) = Cosigned By    Initials Name  Effective Dates Discipline    TC Noe Barcenas Jr., RN 10/17/16 -  Nurse    JEROME BEESiriaDedra Soco, OT 03/02/20 -  OT        Wound 06/21/20 0051 Right anterior;upper;medial arm Puncture (Active)   Dressing Appearance open to air 6/22/2020  9:53 PM   Closure None 6/22/2020  9:53 PM   Base scab 6/22/2020  9:53 PM   Periwound ecchymotic 6/22/2020  9:53 PM   [place]iwound Temperature warm 6/22/2020  9:53 PM   [place] Skin Turgor soft 6/22/2020  9:53 PM   Drainage Amount none 6/22/2020  9:53 PM     Rehab Goal Summary     Row Name 06/23/20 0827             Occupational Therapy Goals    Bed Mobility Goal Selection (OT)  bed mobility, OT goal 1  -KO      Transfer Goal Selection (OT)  transfer, OT goal 1  -KO      Bathing Goal Selection (OT)  bathing, OT goal 1  -KO      Dressing Goal Selection (OT)  dressing, OT goal 1  -KO      Toileting Goal Selection (OT)  toileting, OT goal 1  -KO      Grooming Goal Selection (OT)  grooming, OT goal 1  -KO      Activity Tolerance Goal Selection (OT)  activity tolerance, OT goal 1  -KO         Bed Mobility Goal 1 (OT)    Activity/Assistive Device (Bed Mobility Goal 1, OT)  sit to supine/supine to sit  -KO      [place] Level/Cues Needed (Bed Mobility Goal 1, OT)  minimum assist (75% or more patient effort);verbal cues required;tactile cues required;set-up required  -KO      Time Frame (Bed Mobility Goal 1, OT)  long term goal (LTG);by discharge  -KO      Progress/Outcomes (Bed Mobility Goal 1, OT)  goal not met  -KO         Transfer Goal 1 (OT)    Activity/Assistive Device (Transfer Goal 1, OT)  bed-to-chair/chair-to-bed  -KO      [place] Level/Cues Needed (Transfer Goal 1, OT)  moderate assist (50-74% patient effort);verbal cues required;tactile cues required;set-up required  -KO      Time Frame (Transfer Goal 1, OT)  long term goal (LTG);by discharge  -KO      Progress/Outcome (Transfer Goal 1, OT)  goal not met  -KO         Bathing Goal 1 (OT)    Activity/Assistive Device  (Bathing Goal 1, OT)  upper body bathing  -KO      [place] Level/Cues Needed (Bathing Goal 1, OT)  minimum assist (75% or more patient effort);verbal cues required;tactile cues required;set-up required  -KO      Time Frame (Bathing Goal 1, OT)  long term goal (LTG);by discharge  -KO      Progress/Outcomes (Bathing Goal 1, OT)  goal not met  -KO         Dressing Goal 1 (OT)    Activity/Assistive Device (Dressing Goal 1, OT)  upper body dressing  -KO      [place]/Cues Needed (Dressing Goal 1, OT)  minimum assist (75% or more patient effort);verbal cues required;tactile cues required;set-up required  -KO      Time Frame (Dressing Goal 1, OT)  long term goal (LTG);by discharge  -KO      Progress/Outcome (Dressing Goal 1, OT)  goal not met  -KO         Toileting Goal 1 (OT)    Activity/Device (Toileting Goal 1, OT)  toileting skills, all  -KO      [place] Level/Cues Needed (Toileting Goal 1, OT)  minimum assist (75% or more patient effort);verbal cues required;tactile cues required;set-up required  -KO      Time Frame (Toileting Goal 1, OT)  long term goal (LTG);by discharge  -KO      Progress/Outcome (Toileting Goal 1, OT)  goal not met  -KO         Grooming Goal 1 (OT)    Activity/Device (Grooming Goal 1, OT)  grooming skills, all  -KO      [place] (Grooming Goal 1, OT)  minimum assist (75% or more patient effort);tactile cues required;verbal cues required;set-up required  -KO      Time Frame (Grooming Goal 1, OT)  long term goal (LTG);by discharge  -KO      Progress/Outcome (Grooming Goal 1, OT)  goal not met  -KO          Activity Tolerance Goal 1 (OT)    Activity Level (Endurance Goal 1, OT)  -- 15 min functional activity with proper EC  -KO      Progress/Outcome (Activity Tolerance Goal 1, OT)  goal not met  -KO         Cognitive Goals (OT)    Orientation Goal Selection (OT)  orientation, OT goal 1  -KO         Orientation Goal 1 (OT)    Activity (Orientation Goal 1, OT)  oriented x 4  -KO       [place]/Cues/Accuracy (Orientation Goal 1, OT)  with 90% accuracy  -KO      Time Frame (Orientation Goal 1, OT)  long term goal (LTG);by discharge  -JEROME      Progress/Outcome (Orientation Goal 1, OT)  goal not met  -JEROME        User Key  (r) = Recorded By, (t) = Taken By, (c) = Cosigned By    Initials Name Provider Type Discipline    Soco Whitfield, OT Occupational Therapist OT        Occupational Therapy Education                 Title: PT OT SLP Therapies (In Progress)     Topic: Occupational Therapy (In Progress)     Point: ADL training (In Progress)     Description:   Instruct learner(s) on proper safety adaptation and remediation techniques during self care or transfers.   Instruct in proper use of assistive devices.              Learning Progress Summary           Patient Acceptance, E, NR by JEROME at 6/23/2020 0859    Comment:  Educated pt on fall prevention and proper positioning of LUE/digits                   Point: Home exercise program (Not Started)     Description:   Instruct learner(s) on appropriate technique for monitoring, assisting and/or progressing therapeutic exercises/activities.              Learner Progress:   Not documented in this visit.          Point: Precautions (In Progress)     Description:   Instruct learner(s) on prescribed precautions during self-care and functional transfers.              Learning Progress Summary           Patient Acceptance, E, NR by KO at 6/23/2020 0859    Comment:  Educated pt on fall prevention and proper positioning of LUE/digits    Acceptance, E, VU,NR by  at 6/22/2020 1558    Comment:  Pt educated on role of OT, d/c recs, and POC                   Point: Body mechanics (Not Started)     Description:   Instruct learner(s) on proper positioning and spine alignment during self-care, functional mobility activities and/or exercises.              Learner Progress:   Not documented in this visit.                      User Key     Initials Effective Dates Name  Provider Type Discipline     09/10/19 -  Arias Coyle, OT Occupational Therapist OT    KO 03/02/20 -  Soco Reese OT Occupational Therapist OT                OT Recommendation and Plan  Outcome Summary/Treatment Plan (OT)  Daily Summary of Progress (OT): progress towards functional goals is fair  Plan for Continued Treatment (OT): continue OT POC  Anticipated Discharge Disposition (OT): home with 24/7 care, home with home health(if unable to provide 24/7 will need SNF)  Therapy Frequency (OT Eval): other (see comments)(3-5x/wk)  Daily Summary of Progress (OT): progress towards functional goals is fair  Plan of Care Review  Plan of Care Reviewed With: patient  Plan of Care Reviewed With: patient  Outcome Summary: RN okay'moe OT tx this date. Pt in the bed upon arrival and agreeable but then refusing OOB/EOB. Completed PROM to LUE and educated pt on proper positioning and skin care as noted pt's nails starting to cause skin breakdown in L palm. Placed wash cloth in hand. Pt completed AROM of RUE 2 set x10 reps. Needing VCs for proper technique. Attempted self feeding, however pt throwing flower across the room and refusing further OT tx. Pt in the bed end of session. Needs met and bed alarm on. Pt would benefit from continued OT services. Rec 24/7 supervision/assist and home OT. If unable, will need SNF.  Outcome Measures     Row Name 06/23/20 0827 06/22/20 1421 06/22/20 1420       How much help from another person do you currently need...    Turning from your back to your side while in flat bed without using bedrails?  --  --  3  -KW    Moving from lying on back to sitting on the side of a flat bed without bedrails?  --  --  2  -KW    Moving to and from a bed to a chair (including a wheelchair)?  --  --  1  -KW    Standing up from a chair using your arms (e.g., wheelchair, bedside chair)?  --  --  2  -KW    Climbing 3-5 steps with a railing?  --  --  1  -KW    To walk in hospital room?  --  --  1  -KW    AM-PAC 6  Clicks Score (PT)  --  --  10  -KW       How much help from another is currently needed...    Putting on and taking off regular lower body clothing?  1  -KO  1  -JH  --    Bathing (including washing, rinsing, and drying)  2  -KO  2  -JH  --    Toileting (which includes using toilet bed pan or urinal)  2  -KO  2  -JH  --    Putting on and taking off regular upper body clothing  2  -KO  2  -JH  --    Taking care of personal grooming (such as brushing teeth)  2  -KO  2  -JH  --    Eating meals  3  -KO  3  -JH  --    AM-PAC 6 Clicks Score (OT)  12  -KO  12  -JH  --       Functional Assessment    Outcome Measure Options  AM-PAC 6 Clicks Daily Activity (OT)  -KO  AM-PAC 6 Clicks Daily Activity (OT)  -JH  AM-PAC 6 Clicks Basic Mobility (PT)  -KW      User Key  (r) = Recorded By, (t) = Taken By, (c) = Cosigned By    Initials Name Provider Type    Denisse Johnson, PT Physical Therapist     Arias Coyle OT Occupational Therapist    Soco Whitfield OT Occupational Therapist           Time Calculation:   Time Calculation- OT     Row Name 06/23/20 0904             Time Calculation- OT    OT Start Time  0827  -KO      OT Stop Time  0852  -KO      OT Time Calculation (min)  25 min  -KO      OT Received On  06/23/20  -KO        User Key  (r) = Recorded By, (t) = Taken By, (c) = Cosigned By    Initials Name Provider Type    Soco Whitfield OT Occupational Therapist        Therapy Charges for Today     Code Description Service Date Service Provider Modifiers Qty    [id number]  OT THER PROC EA 15 MIN 6/23/2020 Soco Reese OT GO 2               Soco Reese OT  6/23/2020

## 2020-06-23 NOTE — DISCHARGE SUMMARY
[facility]  DISCHARGE SUMMARY       Date of Admission: 6/20/2020  Date of Discharge:  6/23/2020  Primary Care Physician: Sony Alvarado MD    Presenting Problem/History of Present Illness:  Acute cystitis with hematuria [N30.01]  Failure of outpatient treatment [Z78.9]  Altered mental status, unspecified altered mental status type [R41.82]       Final Discharge Diagnoses:  Active Hospital Problems    Diagnosis   • Acute cystitis with hematuria   • Encephalopathy, metabolic   • Hyponatremia   • History of DVT (deep vein thrombosis)   • Essential hypertension   • Hypokalemia   • Type 2 diabetes mellitus (CMS/Formerly [facility])       Consults:   Consults     Date and Time Order Name Status Description    6/20/2020 1890 Hospitalist (on-call MD unless specified)            Procedures Performed:                 Pertinent Test Results:   Lab Results (last 24 hours)     Procedure Component Value Units Date/Time    Potassium [[id number]]  (Normal) Collected:  06/23/20 1249    Specimen:  Blood Updated:  06/23/20 1318     Potassium 3.6 mmol/L     Blood Culture - Blood, Hand, Right [[id number]] Collected:  06/22/20 1151    Specimen:  Blood from Hand, Right Updated:  06/23/20 1216     Blood Culture No growth at 24 hours    Blood Culture - Blood, Wrist, Left [[id number]] Collected:  06/22/20 1155    Specimen:  Blood from Wrist, Left Updated:  06/23/20 1216     Blood Culture No growth at 24 hours    Magnesium [[id number]]  (Normal) Collected:  06/23/20 0633    Specimen:  Blood Updated:  06/23/20 0855     Magnesium 2.0 mg/dL     Basic Metabolic Panel [[id number]]  (Abnormal) Collected:  06/23/20 0633    Specimen:  Blood Updated:  06/23/20 0719     Glucose 142 mg/dL      BUN 8 mg/dL      Creatinine 0.58 mg/dL      Sodium 136 mmol/L      Potassium 3.1 mmol/L      Chloride 101 mmol/L      CO2 25.0 mmol/L      Calcium 8.1 mg/dL      eGFR Non African Amer 133 mL/min/1.73       BUN/Creatinine Ratio 13.8     Anion Gap 10.0 mmol/L     Narrative:       GFR Normal >60  Chronic Kidney Disease <60  Kidney Failure <15      Protime-INR [[id number]]  (Abnormal) Collected:  06/23/20 0633    Specimen:  Blood Updated:  06/23/20 0703     Protime 34.3 Seconds      INR 3.43    Narrative:       Therapeutic range for most indications is 2.0-3.0 INR,  or 2.5-3.5 for mechanical heart valves.    CBC (No Diff) [[id number]]  (Abnormal) Collected:  06/23/20 0633    Specimen:  Blood Updated:  06/23/20 0652     WBC 6.26 10*3/mm3      RBC 3.84 10*6/mm3      Hemoglobin 11.3 g/dL      Hematocrit 33.7 %      MCV 87.8 fL      MCH 29.4 pg      MCHC 33.5 g/dL      RDW 13.7 %      RDW-SD 43.8 fl      MPV 9.0 fL      Platelets 265 10*3/mm3     POC Glucose Once [[id number]]  (Abnormal) Collected:  06/22/20 1930    Specimen:  Blood Updated:  06/22/20 2112     Glucose 199 mg/dL      Comment: RN NotifiedOperator: [id number] [facility] [id number]       POC Glucose Once [[id number]]  (Normal) Collected:  06/22/20 1638    Specimen:  Blood Updated:  06/22/20 1654     Glucose 110 mg/dL      Comment: : [id number] ALEXANDRA OLMOSMeter ID: [id number]           Imaging Results (All)     Procedure Component Value Units Date/Time    CT Head Without Contrast [[id number]] Collected:  06/20/20 2221     Updated:  06/20/20 2243    Narrative:         CT head without contrast on 6/20/2020     CLINICAL INDICATION: Altered mental status    TECHNIQUE:  Multiple axial images are obtained throughout the  head without the administration of contrast.  This exam was  performed according to our departmental dose-optimization  program, which includes automated exposure control, adjustment of  the mA and/or kV according to patient size and/or use of  iterative reconstruction technique.  Total DLP is 1075.4 mGy*cm.    COMPARISON: 3/7/2019    FINDINGS:  There is generalized cerebral atrophy. There is low  density in the periventricular  white matter consistent with  chronic small vessel ischemic changes. There is no hydrocephalus.  There is no hemorrhage. There are no abnormal extra-axial fluid  collections. There is no mass, mass effect or midline shift.  There is no CT evidence of acute infarct. No bony abnormality is  noted.      Impression:       Atrophy and chronic small vessel ischemic changes  with no acute intracranial abnormality.    Electronically signed by:  Delgado Bess  6/20/2020 10:42 PM  CDT Workstation: [id number]    XR Chest 1 View [[id number]] Collected:  06/20/20 1825     Updated:  06/20/20 2012    Narrative:       PROCEDURE: XR CHEST 1 VW    VIEWS:Single    INDICATION: Altered mental status    COMPARISON: CXR: 3/6/2019    FINDINGS:       - lines/tubes: None    - cardiac: Size within normal limits.    - mediastinum: Contour within normal limits. Atherosclerotic  vascular calcification    - lungs: Low lung volumes. Streaky bibasilar opacities may  represent atelectasis and/or infiltrate..     - pleura: No evidence of  fluid.      - osseous: Unremarkable for age.        Impression:       Low lung volumes with streaky bibasilar atelectasis and/or  infiltrate      Electronically signed by:  Elyssa Montes MD  6/20/2020 8:11 PM CDT  Workstation: [id number]            Chief Complaint on Day of Discharge: none    Hospital Course:  85-year-old  male with past medical history of anxiety, vascular dementia, history of CVA, COPD, history of DVT, hypertension who presented on 6/20/2020 with altered mental status.  CTA head was performed and was negative aside from small vessel changes which are chronic in nature.  This x-ray was performed and showed low lung volumes with streaky bibasilar atelectasis.  Urinalysis was performed consistent with acute cystitis.  Urine culture revealed growth of yeast for which patient was treated with fluconazole.  Blood cultures were performed and showed no growth of organism.  Patient's mental status  "has improved with treatment of his urinary tract infection.  On day of discharge, patient is awake, alert and oriented to person and place.  Patient was seen yesterday by his daughter who reported mental status was returning to baseline.  Patient be discharged home in stable condition today with instructions for 1 week follow-up with PCP.  Home health was offered for further physical and occupational therapy, however patient and family have opted against home health services and wished for patient to return home with 24/7 care provided by his daughters.  He remains on oral fluconazole for completion of treatment for his UTI.  Of note, patient's INR was slightly elevated 3.4 on day of discharge.  He is instructed to hold tonight's dose of warfarin and restart 0.5 mg daily on 6/24/2020.  INR to be rechecked on 6/25/2020.    Condition on Discharge:  Stable     Physical Exam on Discharge:  /72 (BP Location: Right arm, Patient Position: Lying)   Pulse 100   Temp 97.6 °F (36.4 °C) (Oral)   Resp 20   Ht 172.7 cm (67.99\")   Wt 76.3 kg (168 lb 4.8 oz)   SpO2 94%   BMI 25.60 kg/m²   Physical Exam  Constitutional: Vital signs are normal. He appears well-developed and well-nourished. He is cooperative.   HENT:   Head: Normocephalic and atraumatic.   Right Ear: External ear normal.   Left Ear: External ear normal.   Nose: Nose normal.   Eyes: Conjunctivae are normal. Right eye exhibits no discharge. Left eye exhibits no discharge. No scleral icterus.   Neck: Normal range of motion. Neck supple. No JVD present.   Cardiovascular: Normal rate, regular rhythm, normal heart sounds and intact distal pulses. Exam reveals no gallop and no friction rub.   No murmur heard.  Pulmonary/Chest: Effort normal and breath sounds normal. No stridor. No respiratory distress. He has no wheezes. He has no rales.   Abdominal: Soft. Bowel sounds are normal. He exhibits no distension. There is no tenderness.   Musculoskeletal: Normal range " of motion. He exhibits no edema.   Left sided weakness r/t previous stroke.   Neurological: He is alert and oriented to person, place.  Skin: Skin is warm and dry.   Psychiatric: He has a normal mood and affect. His behavior is normal.   Nursing note and vitals reviewed.    [place] Dis[place]  [place] [facility] [place]    Discharge Medications:     Discharge Medications      New Medications      Instructions Start Date   fluconazole 100 MG tablet  Commonly known as:  Diflucan   200 mg, Oral, Daily         Changes to Medications      Instructions Start Date   warfarin 1 MG tablet  Commonly known as:  COUMADIN  What changed:    · how much to take  · when to take this   0.5 mg, Oral, Nightly   Start Date:  June 24, 2020        Continue These Medications      Instructions Start Date   aspirin 81 MG EC tablet   81 mg, Oral, Daily      citalopram 40 MG tablet  Commonly known as:  CeleXA   40 mg, Oral, Daily      cyproheptadine 4 MG tablet  Commonly known as:  PERIACTIN   4 mg, Oral, 3 Times Daily PRN      HYDROcodone-acetaminophen  MG per tablet  Commonly known as:  NORCO   1 tablet, Oral, Every 6 Hours PRN      lovastatin 40 MG tablet  Commonly known as:  MEVACOR   40 mg, Oral, Nightly      potassium chloride 20 MEQ tablet controlled-release ER tablet  Commonly known as:  K-DUR,KLOR-CON   1 tablet, Oral, 2 times daily      tamsulosin 0.4 MG capsule 24 hr capsule  Commonly known as:  FLOMAX   0.4 mg, Oral, Nightly             Discharge Diet:   Diet Instructions     Diet: Cardiac, Consistent Carbohydrate; Thin      Discharge Diet:   Cardiac  Consistent Carbohydrate       Fluid Consistency:  Thin          Activity at Discharge:   Activity Instructions     Activity as Tolerated            Discharge Care Plan/Instructions: Follow up with PCP within one week.     Follow-up Appointments:   Additional Instructions for the Follow-ups that You Need to Schedule     Ambulatory Referral to Home Health   As directed      Face to  Face Visit Date:  6/23/2020    Follow-up provider for Plan of Care?:  I treated the patient in an acute care facility and will not continue treatment after discharge.    Follow-up provider:  VIMAL ALVARADO [[id number]]    Reason/Clinical Findings:  deconditioning r/t uti    Describe mobility limitations that make leaving home difficult:  deconditioning r/t uti    Nursing/Therapeutic Services Requested:  Physical Therapy Occupational Therapy    PT orders:  Strengthening    Occupational orders:  Strengthening    Frequency:  1 Week 1         Discharge Follow-up with PCP   As directed       Currently Documented PCP:    Vimal Alvarado MD    PCP Phone Number:    [phone]     Follow Up Details:  one week         Protime-INR    Jun 25, 2020 (Approximate)        Follow-up Information     Vimal Alvarado MD .    Specialty:  Family Medicine  Why:  one week  Contact information:  [address] [place] [id number] [phone]                   Test Results Pending at Discharge:    Order Current Status    Blood Culture - Blood, Hand, Right Preliminary result    Blood Culture - Blood, Wrist, Left Preliminary result                This document has been electronically signed by KRISTINA Ennis on June 23, 2020 13:47        Time: 35 minutes spent on assessment, discussion, management, and discharge planning.

## 2020-06-23 NOTE — PLAN OF CARE
Problem: Patient Care Overview  Goal: Plan of Care Review  Flowsheets (Taken 6/23/2020 0859)  Plan of Care Reviewed With: patient  Outcome Summary: RN okay'moe OT tx this date. Pt in the bed upon arrival and agreeable but then refusing OOB/EOB. Ox1 only. Completed PROM to LUE and educated pt on proper positioning and skin care as noted pt's nails starting to cause skin breakdown in L palm. Placed wash cloth in hand. Pt completed AROM of RUE 2 set x10 reps. Needing VCs for proper technique. Attempted self feeding, however pt throwing flower across the room and refusing further OT tx. Pt in the bed end of session. Needs met and bed alarm on. Pt would benefit from continued OT services. Rec 24/7 supervision/assist and home OT. If unable, will need SNF.

## 2020-06-27 LAB
BACTERIA SPEC AEROBE CULT: NORMAL
BACTERIA SPEC AEROBE CULT: NORMAL

## 2021-03-11 ENCOUNTER — IMMUNIZATION (OUTPATIENT)
Dept: VACCINE CLINIC | Facility: HOSPITAL | Age: 86
End: 2021-03-11

## 2021-03-11 PROCEDURE — 0001A: CPT | Performed by: THORACIC SURGERY (CARDIOTHORACIC VASCULAR SURGERY)

## 2021-03-11 PROCEDURE — 91300 HC SARSCOV02 VAC 30MCG/0.3ML IM: CPT | Performed by: THORACIC SURGERY (CARDIOTHORACIC VASCULAR SURGERY)

## 2021-04-01 ENCOUNTER — IMMUNIZATION (OUTPATIENT)
Dept: VACCINE CLINIC | Facility: HOSPITAL | Age: 86
End: 2021-04-01

## 2021-04-01 PROCEDURE — 91300 HC SARSCOV02 VAC 30MCG/0.3ML IM: CPT | Performed by: THORACIC SURGERY (CARDIOTHORACIC VASCULAR SURGERY)

## 2021-04-01 PROCEDURE — 0002A: CPT | Performed by: THORACIC SURGERY (CARDIOTHORACIC VASCULAR SURGERY)

## (undated) DEVICE — CANN SMPL SOFTECH BIFLO ETCO2 A/M 7FT

## (undated) DEVICE — SUT GUT CHRM 5/0 RB1 27IN U202H

## (undated) DEVICE — SOL IRR NACL 0.9PCT BT 1000ML

## (undated) DEVICE — GLV SURG NEOLON 2G PF LF 6.5 STRL

## (undated) DEVICE — BANDAGE,GAUZE,CONFORMING,1"X75",STRL,LF: Brand: MEDLINE

## (undated) DEVICE — STERILE POLYISOPRENE POWDER-FREE SURGICAL GLOVES WITH EMOLLIENT COATING: Brand: PROTEXIS

## (undated) DEVICE — GLV SURG SENSICARE PI PF LF 7 GRN STRL

## (undated) DEVICE — SUT GUT CHRM 2/0 SH 27IN G123H

## (undated) DEVICE — DRSNG GZ PETROLTM CURAD NONADHR 1/2X72IN LF STRL

## (undated) DEVICE — GLV SURG NEOLON 2G PF LF 7.5 STRL

## (undated) DEVICE — Device

## (undated) DEVICE — GLV SURG TRIUMPH LT PF LTX 6.5 STRL

## (undated) DEVICE — SUT VIC 2/0 SH 27IN

## (undated) DEVICE — PK MAJ PROC LF 60